# Patient Record
Sex: FEMALE | Race: WHITE | Employment: FULL TIME | URBAN - METROPOLITAN AREA
[De-identification: names, ages, dates, MRNs, and addresses within clinical notes are randomized per-mention and may not be internally consistent; named-entity substitution may affect disease eponyms.]

---

## 2017-03-31 ENCOUNTER — HOSPITAL ENCOUNTER (EMERGENCY)
Age: 57
Discharge: HOME | End: 2017-03-31
Attending: EMERGENCY MEDICINE | Admitting: EMERGENCY MEDICINE
Payer: COMMERCIAL

## 2017-03-31 ENCOUNTER — APPOINTMENT (OUTPATIENT)
Dept: CT IMAGING | Age: 57
End: 2017-03-31
Attending: PHYSICIAN ASSISTANT
Payer: COMMERCIAL

## 2017-03-31 VITALS
HEIGHT: 67 IN | TEMPERATURE: 98.9 F | SYSTOLIC BLOOD PRESSURE: 141 MMHG | OXYGEN SATURATION: 97 % | BODY MASS INDEX: 39.24 KG/M2 | RESPIRATION RATE: 18 BRPM | WEIGHT: 250 LBS | DIASTOLIC BLOOD PRESSURE: 81 MMHG | HEART RATE: 83 BPM

## 2017-03-31 DIAGNOSIS — M17.12 PRIMARY OSTEOARTHRITIS OF LEFT KNEE: ICD-10-CM

## 2017-03-31 DIAGNOSIS — M25.562 CHRONIC PAIN OF LEFT KNEE: Primary | ICD-10-CM

## 2017-03-31 DIAGNOSIS — G89.29 CHRONIC PAIN OF LEFT KNEE: Primary | ICD-10-CM

## 2017-03-31 LAB
APPEARANCE UR: CLEAR
BACTERIA URNS QL MICRO: NEGATIVE /HPF
BILIRUB UR QL: NEGATIVE
COLOR UR: ABNORMAL
EPITH CASTS URNS QL MICRO: ABNORMAL /LPF
GLUCOSE UR STRIP.AUTO-MCNC: NEGATIVE MG/DL
HGB UR QL STRIP: NEGATIVE
HYALINE CASTS URNS QL MICRO: ABNORMAL /LPF (ref 0–5)
KETONES UR QL STRIP.AUTO: NEGATIVE MG/DL
LEUKOCYTE ESTERASE UR QL STRIP.AUTO: ABNORMAL
NITRITE UR QL STRIP.AUTO: NEGATIVE
PH UR STRIP: 6.5 [PH] (ref 5–8)
PROT UR STRIP-MCNC: NEGATIVE MG/DL
RBC #/AREA URNS HPF: ABNORMAL /HPF (ref 0–5)
SP GR UR REFRACTOMETRY: 1.01 (ref 1–1.03)
UA: UC IF INDICATED,UAUC: ABNORMAL
UROBILINOGEN UR QL STRIP.AUTO: 0.2 EU/DL (ref 0.2–1)
WBC URNS QL MICRO: ABNORMAL /HPF (ref 0–4)

## 2017-03-31 PROCEDURE — 99282 EMERGENCY DEPT VISIT SF MDM: CPT

## 2017-03-31 PROCEDURE — 96372 THER/PROPH/DIAG INJ SC/IM: CPT

## 2017-03-31 PROCEDURE — 74176 CT ABD & PELVIS W/O CONTRAST: CPT

## 2017-03-31 PROCEDURE — 74011250637 HC RX REV CODE- 250/637: Performed by: PHYSICIAN ASSISTANT

## 2017-03-31 PROCEDURE — 74011250636 HC RX REV CODE- 250/636: Performed by: PHYSICIAN ASSISTANT

## 2017-03-31 PROCEDURE — 81001 URINALYSIS AUTO W/SCOPE: CPT | Performed by: EMERGENCY MEDICINE

## 2017-03-31 RX ORDER — ONDANSETRON 4 MG/1
4 TABLET, ORALLY DISINTEGRATING ORAL
Status: COMPLETED | OUTPATIENT
Start: 2017-03-31 | End: 2017-03-31

## 2017-03-31 RX ORDER — KETOROLAC TROMETHAMINE 30 MG/ML
30 INJECTION, SOLUTION INTRAMUSCULAR; INTRAVENOUS
Status: COMPLETED | OUTPATIENT
Start: 2017-03-31 | End: 2017-03-31

## 2017-03-31 RX ORDER — OXYCODONE AND ACETAMINOPHEN 5; 325 MG/1; MG/1
1 TABLET ORAL
Qty: 15 TAB | Refills: 0 | Status: ON HOLD | OUTPATIENT
Start: 2017-03-31 | End: 2020-10-29

## 2017-03-31 RX ORDER — HYDROMORPHONE HYDROCHLORIDE 1 MG/ML
1 INJECTION, SOLUTION INTRAMUSCULAR; INTRAVENOUS; SUBCUTANEOUS
Status: COMPLETED | OUTPATIENT
Start: 2017-03-31 | End: 2017-03-31

## 2017-03-31 RX ADMIN — KETOROLAC TROMETHAMINE 30 MG: 30 INJECTION, SOLUTION INTRAMUSCULAR at 13:58

## 2017-03-31 RX ADMIN — HYDROMORPHONE HYDROCHLORIDE 1 MG: 1 INJECTION, SOLUTION INTRAMUSCULAR; INTRAVENOUS; SUBCUTANEOUS at 13:58

## 2017-03-31 RX ADMIN — ONDANSETRON 4 MG: 4 TABLET, ORALLY DISINTEGRATING ORAL at 13:58

## 2017-03-31 NOTE — ED PROVIDER NOTES
HPI Comments: Jim Calixto is a 55 y.o. female with PMhx significant for fibromyalgia and kidney stones who presents ambulatory to the ED with cc of 8/10 gradually worsening left knee pain which radiated to her left calf x 2 days. She also c/o associated swelling to her left knee. The pt reports a h/o right knee replacement in 11/2016 in [place], [place]. She states that she has not taken any pain medications for her symptoms. She denies any recent long distance travel, and she denies any h/o DVT. She denies any chance of pregnancy, and she specifically denies appetite change, fever, chills, or frequency at this time. The pt also c/o gradually worsening left flank pain. She states that she was recently diagnosed with kidney stones, noting that she has passed some stones. SHx: -tobacco, -EtOH, -illicit drug use    PCP: Julee Dumont MD    There are no other complaints, changes or physical findings at this time. The history is provided by the patient. No  was used. Past Medical History:   Diagnosis Date    Fibromyalgia     Kidney stones     passed 72 stones    Other ill-defined conditions(799.89)     kidney stones       Past Surgical History:   Procedure Laterality Date    HX OTHER SURGICAL      lung biopsy         History reviewed. No pertinent family history. Social History     Social History    Marital status: UNKNOWN     Spouse name: N/A    Number of children: N/A    Years of education: N/A     Occupational History    Not on file. Social History Main Topics    Smoking status: Never Smoker    Smokeless tobacco: Never Used    Alcohol use No    Drug use: No    Sexual activity: No     Other Topics Concern    Not on file     Social History Narrative         ALLERGIES: Sulfa (sulfonamide antibiotics)    Review of Systems   Constitutional: Negative. Negative for activity change, appetite change, chills, fatigue, fever and unexpected weight change.    HENT: Negative. Negative for congestion, hearing loss, rhinorrhea, sneezing and voice change. Eyes: Negative. Negative for pain and visual disturbance. Respiratory: Negative. Negative for apnea, cough, choking, chest tightness and shortness of breath. Cardiovascular: Negative. Negative for chest pain and palpitations. Gastrointestinal: Negative. Negative for abdominal distention, abdominal pain, blood in stool, diarrhea, nausea and vomiting. Genitourinary: Positive for flank pain (left). Negative for difficulty urinating, frequency and urgency. No discharge   Musculoskeletal: Positive for arthralgias (left knee) and joint swelling (left knee). Negative for back pain, myalgias and neck stiffness. Skin: Negative. Negative for color change and rash. Neurological: Negative. Negative for dizziness, seizures, syncope, speech difficulty, weakness, numbness and headaches. Hematological: Negative for adenopathy. Psychiatric/Behavioral: Negative. Negative for agitation, behavioral problems, dysphoric mood and suicidal ideas. The patient is not nervous/anxious. Patient Vitals for the past 12 hrs:   Temp Pulse Resp BP SpO2   03/31/17 1438 - 83 18 141/81 97 %   03/31/17 1324 98.9 °F (37.2 °C) 100 20 164/87 95 %            Physical Exam   Constitutional: She is oriented to person, place, and time. She appears well-developed and well-nourished. No distress. HENT:   Head: Normocephalic and atraumatic. Right Ear: External ear normal.   Left Ear: External ear normal.   Nose: Nose normal.   Mouth/Throat: Oropharynx is clear and moist. No oropharyngeal exudate. Eyes: Conjunctivae and EOM are normal. Pupils are equal, round, and reactive to light. Right eye exhibits no discharge. Left eye exhibits no discharge. No scleral icterus. Neck: Normal range of motion. Neck supple. No JVD present. No tracheal deviation present.    Cardiovascular: Normal rate, regular rhythm, normal heart sounds and intact distal pulses. Exam reveals no gallop and no friction rub. No murmur heard. Pulmonary/Chest: Effort normal and breath sounds normal. No respiratory distress. She has no wheezes. She has no rales. She exhibits no tenderness. Abdominal: Soft. Bowel sounds are normal. She exhibits no distension and no mass. There is no tenderness. There is no rebound and no guarding. Musculoskeletal: She exhibits edema (left knee) and tenderness (left knee). Decreased active and passive range of motion to left knee with effusion; negative Homans' sign. Lymphadenopathy:     She has no cervical adenopathy. Neurological: She is alert and oriented to person, place, and time. She has normal reflexes. No cranial nerve deficit. She exhibits normal muscle tone. Coordination normal.   Skin: Skin is warm and dry. She is not diaphoretic. Psychiatric: She has a normal mood and affect. Her behavior is normal. Judgment and thought content normal.   Nursing note and vitals reviewed. MDM  Number of Diagnoses or Management Options  Diagnosis management comments:   DDx: DJD, effusion, kidney stones, UTI.        Amount and/or Complexity of Data Reviewed  Clinical lab tests: ordered and reviewed  Tests in the radiology section of CPT®: ordered and reviewed  Review and summarize past medical records: yes  Independent visualization of images, tracings, or specimens: yes    Patient Progress  Patient progress: stable    ED Course       Procedures      LABORATORY TESTS:  Recent Results (from the past 12 hour(s))   URINALYSIS W/ REFLEX CULTURE    Collection Time: 03/31/17  1:40 PM   Result Value Ref Range    Color YELLOW/STRAW      Appearance CLEAR CLEAR      Specific gravity 1.011 1.003 - 1.030      pH (UA) 6.5 5.0 - 8.0      Protein NEGATIVE  NEG mg/dL    Glucose NEGATIVE  NEG mg/dL    Ketone NEGATIVE  NEG mg/dL    Bilirubin NEGATIVE  NEG      Blood NEGATIVE  NEG      Urobilinogen 0.2 0.2 - 1.0 EU/dL    Nitrites NEGATIVE  NEG Leukocyte Esterase TRACE (A) NEG      WBC 0-4 0 - 4 /hpf    RBC 0-5 0 - 5 /hpf    Epithelial cells FEW FEW /lpf    Bacteria NEGATIVE  NEG /hpf    UA:UC IF INDICATED CULTURE NOT INDICATED BY UA RESULT CNI      Hyaline cast 0-2 0 - 5 /lpf       IMAGING RESULTS:  CT ABD PELV WO CONT   Final Result   Clinical history: Left flank pain, history of nephrolithiasis     INDICATION: l flank pain prior stones     COMPARISON:     TECHNIQUE:   Thin axial images were obtained through the abdomen and pelvis. Coronal and  sagittal reconstructions were generated. Oral contrast was not administered. CT  dose reduction was achieved through use of a standardized protocol tailored for  this examination and automatic exposure control for dose modulation.      The absence of intravenous contrast material reduces the sensitivity for  evaluation of the solid parenchymal organs of the abdomen.      FINDINGS:   CRITICAL FINDINGS:      None         INCIDENTALS: There are punctate nonobstructive renal calculi on the right and on  the left up to 4 mm in size. There is no hydroureter or hydronephrosis present. No evidence of diverticulitis. Left lower pole renal hypodensity is likely a  simple cyst.        ADRENALS/KIDNEYS: No mass, or hydronephrosis. there is no hydroureter or  hydronephrosis. There is no renal mass. There is no perinephric mass.     COLON AND SMALL BOWEL: No dilatation or wall thickening. There is no obstruction  or free intraperitoneal air. There is no evidence of incarceration or  obstruction.     APPENDIX: Unremarkable.     URINARY BLADDER: No mass or calculus.     BONES: No destructive bone lesion.           IMPRESSION  IMPRESSION:      Bilateral renal calculi up to 4 mm in size. No associated hydroureter or  hydronephrosis.     No acute intraperitoneal process is identified.        MEDICATIONS GIVEN:  Medications   ketorolac (TORADOL) injection 30 mg (30 mg IntraMUSCular Given 3/31/17 8213)   HYDROmorphone (PF) (DILAUDID) injection 1 mg (1 mg IntraMUSCular Given 3/31/17 0359)   ondansetron (ZOFRAN ODT) tablet 4 mg (4 mg Oral Given 3/31/17 1358)       IMPRESSION:  1. Chronic pain of left knee    2. Primary osteoarthritis of left knee        PLAN:  1. Current Discharge Medication List      START taking these medications    Details   oxyCODONE-acetaminophen (PERCOCET) 5-325 mg per tablet Take 1 Tab by mouth every six (6) hours as needed for Pain. Max Daily Amount: 4 Tabs. Qty: 15 Tab, Refills: 0           2. Follow-up Information     Follow up With Details Comments 1500 David Grigsby MD In 3 days As needed [address] [place] [address] Internal Medicine  Carmelita Dotson [id number] [phone]          Return to ED if worse       DISCHARGE NOTE:  3:09 PM  The patient has been re-evaluated and is ready for discharge. Reviewed available results with patient. Counseled patient on diagnosis and care plan. Patient has expressed understanding, and all questions have been answered. Patient agrees with plan and agrees to follow up as recommended, or return to the ED if their symptoms worsen. Discharge instructions have been provided and explained to the patient, along with reasons to return to the ED. This note is prepared by Joan Rainey, acting as Scribe for [employer]. [employer]: The scribe's documentation has been prepared under my direction and personally reviewed by me in its entirety. I confirm that the note above accurately reflects all work, treatment, procedures, and medical decision making performed by me.

## 2017-03-31 NOTE — ED NOTES
RUPESH Houston provided patient with verbal and written discharge instructions. Patient discharged ambulatory with family.

## 2017-03-31 NOTE — DISCHARGE INSTRUCTIONS
Arthritis: Care Instructions  Your Care Instructions  Arthritis, also called osteoarthritis, is a breakdown of the cartilage that cushions your joints. When the cartilage wears down, your bones rub against each other. This causes pain and stiffness. Many people have some arthritis as they age. Arthritis most often affects the joints of the spine, hands, hips, knees, or feet. You can take simple measures to protect your joints, ease your pain, and help you stay active. Follow-up care is a key part of your treatment and safety. Be sure to make and go to all appointments, and call your doctor if you are having problems. It's also a good idea to know your test results and keep a list of the medicines you take. How can you care for yourself at home? · Stay at a healthy weight. Being overweight puts extra strain on your joints. · Talk to your doctor or physical therapist about exercises that will help ease joint pain. ¨ Stretch. You may enjoy gentle forms of yoga to help keep your joints and muscles flexible. ¨ Walk instead of jog. Other types of exercise that are less stressful on the joints include riding a bicycle, swimming, sary chi, or water exercise. ¨ Lift weights. Strong muscles help reduce stress on your joints. Stronger thigh muscles, for example, take some of the stress off of the knees and hips. Learn the right way to lift weights so you do not make joint pain worse. · Take your medicines exactly as prescribed. Call your doctor if you think you are having a problem with your medicine. · Take pain medicines exactly as directed. ¨ If the doctor gave you a prescription medicine for pain, take it as prescribed. ¨ If you are not taking a prescription pain medicine, ask your doctor if you can take an over-the-counter medicine. · Use a cane, crutch, walker, or another device if you need help to get around. These can help rest your joints.  You also can use other things to make life easier, such as a higher toilet seat and padded handles on kitchen utensils. · Do not sit in low chairs, which can make it hard to get up. · Put heat or cold on your sore joints as needed. Use whichever helps you most. You also can take turns with hot and cold packs. ¨ Apply heat 2 or 3 times a day for 20 to 30 minutes--using a heating pad, hot shower, or hot pack--to relieve pain and stiffness. ¨ Put ice or a cold pack on your sore joint for 10 to 20 minutes at a time. Put a thin cloth between the ice and your skin. When should you call for help? Call your doctor now or seek immediate medical care if:  · You have sudden swelling, warmth, or pain in any joint. · You have joint pain and a fever or rash. · You have such bad pain that you cannot use a joint. Watch closely for changes in your health, and be sure to contact your doctor if:  · You have mild joint symptoms that continue even with more than 6 weeks of care at home. · You have stomach pain or other problems with your medicine. Where can you learn more? Go to http://nataliaI-Marketgalina.info/. Enter H412 in the search box to learn more about \"Arthritis: Care Instructions. \"  Current as of: November 28, 2016  Content Version: 11.2  © 0200-6854 [employer]. Care instructions adapted under license by [employer] (which disclaims liability or warranty for this information). If you have questions about a medical condition or this instruction, always ask your healthcare professional. [place] [address] any warranty or liability for your use of this information. Joint Pain: Care Instructions  Your Care Instructions  Many people have small aches and pains from overuse or injury to muscles and joints. Joint injuries often happen during sports or recreation, work tasks, or projects around the home.  An overuse injury can happen when you put too much stress on a joint or when you do an activity that stresses the joint over and over, such as using the computer or rowing a boat. You can take action at home to help your muscles and joints get better. You should feel better in 1 to 2 weeks, but it can take 3 months or more to heal completely. Follow-up care is a key part of your treatment and safety. Be sure to make and go to all appointments, and call your doctor if you are having problems. It's also a good idea to know your test results and keep a list of the medicines you take. How can you care for yourself at home? · Do not put weight on the injured joint for at least a day or two. · For the first day or two after an injury, do not take hot showers or baths, and do not use hot packs. The heat could make swelling worse. · Put ice or a cold pack on the sore joint for 10 to 20 minutes at a time. Try to do this every 1 to 2 hours for the next 3 days (when you are awake) or until the swelling goes down. Put a thin cloth between the ice and your skin. · Wrap the injury in an elastic bandage. Do not wrap it too tightly because this can cause more swelling. · Prop up the sore joint on a pillow when you ice it or anytime you sit or lie down during the next 3 days. Try to keep it above the level of your heart. This will help reduce swelling. · Take an over-the-counter pain medicine, such as acetaminophen (Tylenol), ibuprofen (Advil, Motrin), or naproxen (Aleve). Read and follow all instructions on the label. · After 1 or 2 days of rest, begin moving the joint gently. While the joint is still healing, you can begin to exercise using activities that do not strain or hurt the painful joint. When should you call for help? Call your doctor now or seek immediate medical care if:  · You have signs of infection, such as:  ¨ Increased pain, swelling, warmth, and redness. ¨ Red streaks leading from the joint. ¨ A fever.   Watch closely for changes in your health, and be sure to contact your doctor if:  · Your movement or symptoms are not getting better after 1 to 2 weeks of home treatment. Where can you learn more? Go to http://natalia-galina.info/. Enter P205 in the search box to learn more about \"Joint Pain: Care Instructions. \"  Current as of: May 23, 2016  Content Version: 11.2  © 0916-0600 [employer]. Care instructions adapted under license by [employer] (which disclaims liability or warranty for this information). If you have questions about a medical condition or this instruction, always ask your healthcare professional. [place] [address] any warranty or liability for your use of this information. Knee Arthritis: Care Instructions  Your Care Instructions  Knee arthritis is a breakdown of the cartilage that cushions your knee joint. When the cartilage wears down, your bones rub against each other. This causes pain and stiffness. Knee arthritis tends to get worse with time. Treatment for knee arthritis involves reducing pain, making the leg muscles stronger, and staying at a healthy body weight. The treatment usually does not improve the health of the cartilage, but it can reduce pain and improve how well your knee works. You can take simple measures to protect your knee joints, ease your pain, and help you stay active. Follow-up care is a key part of your treatment and safety. Be sure to make and go to all appointments, and call your doctor if you are having problems. It's also a good idea to know your test results and keep a list of the medicines you take. How can you care for yourself at home? · Know that knee arthritis will cause more pain on some days than on others. · Stay at a healthy weight. Lose weight if you are overweight. When you stand up, the pressure on your knees from every pound of body weight is multiplied four times. So if you lose 10 pounds, you will reduce the pressure on your knees by 40 pounds.   · Talk to your doctor or physical therapist about exercises that will help ease joint pain. ¨ Stretch to help prevent stiffness and to prevent injury before you exercise. You may enjoy gentle forms of yoga to help keep your knee joints and muscles flexible. ¨ Walk instead of jog. ¨ Ride a bike. This makes your thigh muscles stronger and takes pressure off your knee. ¨ Wear well-fitting and comfortable shoes. ¨ Exercise in chest-deep water. This can help you exercise longer with less pain. ¨ Avoid exercises that include squatting or kneeling. They can put a lot of strain on your knees. ¨ Talk to your doctor to make sure that the exercise you do is not making the arthritis worse. · Do not sit for long periods of time. Try to walk once in a while to keep your knee from getting stiff. · Ask your doctor or physical therapist whether shoe inserts may reduce your arthritis pain. · If you can afford it, get new athletic shoes at least every year. This can help reduce the strain on your knees. · Use a device to help you do everyday activities. ¨ A cane or walking stick can help you keep your balance when you walk. Hold the cane or walking stick in the hand opposite the painful knee. ¨ If you feel like you may fall when you walk, try using crutches or a front-wheeled walker. These can prevent falls that could cause more damage to your knee. ¨ A knee brace may help keep your knee stable and prevent pain. ¨ You also can use other things to make life easier, such as a higher toilet seat and handrails in the bathtub or shower. · Take pain medicines exactly as directed. ¨ Do not wait until you are in severe pain. You will get better results if you take it sooner. ¨ If you are not taking a prescription pain medicine, take an over-the-counter medicine such as acetaminophen (Tylenol), ibuprofen (Advil, Motrin), or naproxen (Aleve). Read and follow all instructions on the label.   ¨ Do not take two or more pain medicines at the same time unless the doctor told you to. Many pain medicines have acetaminophen, which is Tylenol. Too much acetaminophen (Tylenol) can be harmful. ¨ Tell your doctor if you take a blood thinner, have diabetes, or have allergies to shellfish. · Ask your doctor if you might benefit from a shot of steroid medicine into your knee. This may provide pain relief for several months. · Many people take the supplements glucosamine and chondroitin for osteoarthritis. Some people feel they help, but the medical research does not show that they work. Talk to your doctor before you take these supplements. When should you call for help? Call your doctor now or seek immediate medical care if:  · You have sudden swelling, warmth, or pain in your knee. · You have knee pain and a fever or rash. · You have such bad pain that you cannot use your knee. Watch closely for changes in your health, and be sure to contact your doctor if you have any problems. Where can you learn more? Go to http://natalia-galina.info/. Enter Y341 in the search box to learn more about \"Knee Arthritis: Care Instructions. \"  Current as of: October 31, 2016  Content Version: 11.2  © 4078-2338 [employer]. Care instructions adapted under license by [employer] (which disclaims liability or warranty for this information). If you have questions about a medical condition or this instruction, always ask your healthcare professional. [address] any warranty or liability for your use of this information.

## 2017-03-31 NOTE — ED NOTES
Pt arrives to ED with complaints of left knee pain and swelling x2 days. Pt states that she knows she needs a knee replacement because she has bone on bone but she is from out of town and can not get get to her her Dr at home. Pt also has complaints of left flank pain and states she has been \"battling a UTI\". No other complaints at this time. Will continue to monitor.

## 2017-04-03 ENCOUNTER — PATIENT OUTREACH (OUTPATIENT)
Dept: INTERNAL MEDICINE CLINIC | Age: 57
End: 2017-04-03

## 2017-04-03 NOTE — PROGRESS NOTES
NNTOCED-    Patient noted on [facility] report. NN noted on EMR review that this patient has not seen Dr Ovidio Bran since 2015. NN contacted patient who verified that she is only in town visiting and now lives in [facility]. Dr Ovidio Bran is therefore no longer her PCP and will be removed as PCP in the EMR. Patient encouraged to call the clinic if she needs any further assistance/ care while in town.

## 2020-10-23 ENCOUNTER — HOSPITAL ENCOUNTER (INPATIENT)
Age: 60
LOS: 6 days | Discharge: HOME | DRG: 871 | End: 2020-10-29
Attending: STUDENT IN AN ORGANIZED HEALTH CARE EDUCATION/TRAINING PROGRAM | Admitting: INTERNAL MEDICINE
Payer: COMMERCIAL

## 2020-10-23 ENCOUNTER — APPOINTMENT (OUTPATIENT)
Dept: GENERAL RADIOLOGY | Age: 60
DRG: 871 | End: 2020-10-23
Attending: EMERGENCY MEDICINE
Payer: COMMERCIAL

## 2020-10-23 DIAGNOSIS — J12.82 PNEUMONIA DUE TO COVID-19 VIRUS: Primary | ICD-10-CM

## 2020-10-23 DIAGNOSIS — U07.1 PNEUMONIA DUE TO COVID-19 VIRUS: Primary | ICD-10-CM

## 2020-10-23 DIAGNOSIS — F41.9 ANXIETY: ICD-10-CM

## 2020-10-23 PROBLEM — G47.33 OSA ON CPAP: Chronic | Status: ACTIVE | Noted: 2020-10-23

## 2020-10-23 PROBLEM — Z99.89 OSA ON CPAP: Chronic | Status: ACTIVE | Noted: 2020-10-23

## 2020-10-23 PROBLEM — Z99.89 OSA ON CPAP: Status: ACTIVE | Noted: 2020-10-23

## 2020-10-23 PROBLEM — J96.01 ACUTE RESPIRATORY FAILURE WITH HYPOXIA (HCC): Status: ACTIVE | Noted: 2020-10-23

## 2020-10-23 PROBLEM — G47.33 OSA ON CPAP: Status: ACTIVE | Noted: 2020-10-23

## 2020-10-23 PROBLEM — R79.89 ELEVATED LFTS: Status: ACTIVE | Noted: 2020-10-23

## 2020-10-23 PROBLEM — E87.6 HYPOKALEMIA: Status: ACTIVE | Noted: 2020-10-23

## 2020-10-23 LAB
ALBUMIN SERPL-MCNC: 3.1 G/DL (ref 3.5–5)
ALBUMIN/GLOB SERPL: 0.7 {RATIO} (ref 1.1–2.2)
ALP SERPL-CCNC: 70 U/L (ref 45–117)
ALT SERPL-CCNC: 150 U/L (ref 12–78)
ANION GAP SERPL CALC-SCNC: 8 MMOL/L (ref 5–15)
APTT PPP: 27.8 SEC (ref 22.1–32)
AST SERPL-CCNC: 111 U/L (ref 15–37)
BASOPHILS # BLD: 0 K/UL (ref 0–0.1)
BASOPHILS NFR BLD: 0 % (ref 0–1)
BILIRUB SERPL-MCNC: 0.5 MG/DL (ref 0.2–1)
BUN SERPL-MCNC: 15 MG/DL (ref 6–20)
BUN/CREAT SERPL: 21 (ref 12–20)
CALCIUM SERPL-MCNC: 9 MG/DL (ref 8.5–10.1)
CHLORIDE SERPL-SCNC: 99 MMOL/L (ref 97–108)
CO2 SERPL-SCNC: 31 MMOL/L (ref 21–32)
COMMENT, HOLDF: NORMAL
COMMENT, HOLDF: NORMAL
CREAT SERPL-MCNC: 0.72 MG/DL (ref 0.55–1.02)
D DIMER PPP FEU-MCNC: 0.61 MG/L FEU (ref 0–0.65)
DIFFERENTIAL METHOD BLD: ABNORMAL
EOSINOPHIL # BLD: 0 K/UL (ref 0–0.4)
EOSINOPHIL NFR BLD: 0 % (ref 0–7)
ERYTHROCYTE [DISTWIDTH] IN BLOOD BY AUTOMATED COUNT: 13.1 % (ref 11.5–14.5)
FERRITIN SERPL-MCNC: 97 NG/ML (ref 8–252)
FIBRINOGEN PPP-MCNC: 606 MG/DL (ref 200–475)
GLOBULIN SER CALC-MCNC: 4.3 G/DL (ref 2–4)
GLUCOSE SERPL-MCNC: 104 MG/DL (ref 65–100)
HCT VFR BLD AUTO: 35.9 % (ref 35–47)
HGB BLD-MCNC: 12.2 G/DL (ref 11.5–16)
IMM GRANULOCYTES # BLD AUTO: 0 K/UL (ref 0–0.04)
IMM GRANULOCYTES NFR BLD AUTO: 0 % (ref 0–0.5)
INR PPP: 1 (ref 0.9–1.1)
LACTATE SERPL-SCNC: 0.7 MMOL/L (ref 0.4–2)
LDH SERPL L TO P-CCNC: 322 U/L (ref 81–246)
LYMPHOCYTES # BLD: 0.7 K/UL (ref 0.8–3.5)
LYMPHOCYTES NFR BLD: 19 % (ref 12–49)
MCH RBC QN AUTO: 30.3 PG (ref 26–34)
MCHC RBC AUTO-ENTMCNC: 34 G/DL (ref 30–36.5)
MCV RBC AUTO: 89.1 FL (ref 80–99)
METAMYELOCYTES NFR BLD MANUAL: 1 %
MONOCYTES # BLD: 0.2 K/UL (ref 0–1)
MONOCYTES NFR BLD: 5 % (ref 5–13)
NEUTS BAND NFR BLD MANUAL: 9 %
NEUTS SEG # BLD: 2.9 K/UL (ref 1.8–8)
NEUTS SEG NFR BLD: 66 % (ref 32–75)
NRBC # BLD: 0 K/UL (ref 0–0.01)
NRBC BLD-RTO: 0 PER 100 WBC
PLATELET # BLD AUTO: 189 K/UL (ref 150–400)
PMV BLD AUTO: 11 FL (ref 8.9–12.9)
POTASSIUM SERPL-SCNC: 3.2 MMOL/L (ref 3.5–5.1)
PROCALCITONIN SERPL-MCNC: <0.05 NG/ML
PROT SERPL-MCNC: 7.4 G/DL (ref 6.4–8.2)
PROTHROMBIN TIME: 10.3 SEC (ref 9–11.1)
RBC # BLD AUTO: 4.03 M/UL (ref 3.8–5.2)
RBC MORPH BLD: ABNORMAL
SAMPLES BEING HELD,HOLD: NORMAL
SAMPLES BEING HELD,HOLD: NORMAL
SODIUM SERPL-SCNC: 138 MMOL/L (ref 136–145)
THERAPEUTIC RANGE,PTTT: NORMAL SECS (ref 58–77)
TROPONIN I SERPL-MCNC: <0.05 NG/ML
WBC # BLD AUTO: 3.8 K/UL (ref 3.6–11)

## 2020-10-23 PROCEDURE — 85730 THROMBOPLASTIN TIME PARTIAL: CPT

## 2020-10-23 PROCEDURE — 85025 COMPLETE CBC W/AUTO DIFF WBC: CPT

## 2020-10-23 PROCEDURE — 74011000258 HC RX REV CODE- 258: Performed by: INTERNAL MEDICINE

## 2020-10-23 PROCEDURE — 99284 EMERGENCY DEPT VISIT MOD MDM: CPT

## 2020-10-23 PROCEDURE — 74011250637 HC RX REV CODE- 250/637: Performed by: STUDENT IN AN ORGANIZED HEALTH CARE EDUCATION/TRAINING PROGRAM

## 2020-10-23 PROCEDURE — 74011250636 HC RX REV CODE- 250/636: Performed by: INTERNAL MEDICINE

## 2020-10-23 PROCEDURE — 85379 FIBRIN DEGRADATION QUANT: CPT

## 2020-10-23 PROCEDURE — 71045 X-RAY EXAM CHEST 1 VIEW: CPT

## 2020-10-23 PROCEDURE — 85610 PROTHROMBIN TIME: CPT

## 2020-10-23 PROCEDURE — 74011250637 HC RX REV CODE- 250/637: Performed by: PHYSICIAN ASSISTANT

## 2020-10-23 PROCEDURE — 85384 FIBRINOGEN ACTIVITY: CPT

## 2020-10-23 PROCEDURE — 80053 COMPREHEN METABOLIC PANEL: CPT

## 2020-10-23 PROCEDURE — 83605 ASSAY OF LACTIC ACID: CPT

## 2020-10-23 PROCEDURE — 74011250637 HC RX REV CODE- 250/637: Performed by: INTERNAL MEDICINE

## 2020-10-23 PROCEDURE — 84145 PROCALCITONIN (PCT): CPT

## 2020-10-23 PROCEDURE — 65660000000 HC RM CCU STEPDOWN

## 2020-10-23 PROCEDURE — 36415 COLL VENOUS BLD VENIPUNCTURE: CPT

## 2020-10-23 PROCEDURE — 83615 LACTATE (LD) (LDH) ENZYME: CPT

## 2020-10-23 PROCEDURE — 0100U RESPIRATORY PANEL,PCR,NASOPHARYNGEAL: CPT

## 2020-10-23 PROCEDURE — 74011250636 HC RX REV CODE- 250/636: Performed by: STUDENT IN AN ORGANIZED HEALTH CARE EDUCATION/TRAINING PROGRAM

## 2020-10-23 PROCEDURE — 65270000029 HC RM PRIVATE

## 2020-10-23 PROCEDURE — 84484 ASSAY OF TROPONIN QUANT: CPT

## 2020-10-23 PROCEDURE — 82728 ASSAY OF FERRITIN: CPT

## 2020-10-23 RX ORDER — ASCORBIC ACID 500 MG
1000 TABLET ORAL DAILY
Status: DISCONTINUED | OUTPATIENT
Start: 2020-10-24 | End: 2020-10-29 | Stop reason: HOSPADM

## 2020-10-23 RX ORDER — GUAIFENESIN/DEXTROMETHORPHAN 100-10MG/5
5 SYRUP ORAL
Status: DISCONTINUED | OUTPATIENT
Start: 2020-10-23 | End: 2020-10-29 | Stop reason: HOSPADM

## 2020-10-23 RX ORDER — DEXAMETHASONE 4 MG/1
6 TABLET ORAL DAILY
Status: DISCONTINUED | OUTPATIENT
Start: 2020-10-24 | End: 2020-10-29 | Stop reason: HOSPADM

## 2020-10-23 RX ORDER — GABAPENTIN 300 MG/1
300 CAPSULE ORAL 3 TIMES DAILY
Status: DISCONTINUED | OUTPATIENT
Start: 2020-10-23 | End: 2020-10-29 | Stop reason: HOSPADM

## 2020-10-23 RX ORDER — DULOXETIN HYDROCHLORIDE 30 MG/1
60 CAPSULE, DELAYED RELEASE ORAL 2 TIMES DAILY
Status: DISCONTINUED | OUTPATIENT
Start: 2020-10-23 | End: 2020-10-29 | Stop reason: HOSPADM

## 2020-10-23 RX ORDER — SODIUM CHLORIDE 0.9 % (FLUSH) 0.9 %
5-40 SYRINGE (ML) INJECTION AS NEEDED
Status: DISCONTINUED | OUTPATIENT
Start: 2020-10-23 | End: 2020-10-29 | Stop reason: HOSPADM

## 2020-10-23 RX ORDER — POTASSIUM CHLORIDE 750 MG/1
40 TABLET, FILM COATED, EXTENDED RELEASE ORAL
Status: COMPLETED | OUTPATIENT
Start: 2020-10-23 | End: 2020-10-23

## 2020-10-23 RX ORDER — DEXAMETHASONE 4 MG/1
6 TABLET ORAL ONCE
Status: COMPLETED | OUTPATIENT
Start: 2020-10-23 | End: 2020-10-23

## 2020-10-23 RX ORDER — ZINC SULFATE 50(220)MG
1 CAPSULE ORAL DAILY
Status: DISCONTINUED | OUTPATIENT
Start: 2020-10-24 | End: 2020-10-29 | Stop reason: HOSPADM

## 2020-10-23 RX ORDER — ACETAMINOPHEN 500 MG
1000 TABLET ORAL ONCE
Status: COMPLETED | OUTPATIENT
Start: 2020-10-23 | End: 2020-10-23

## 2020-10-23 RX ORDER — ONDANSETRON 2 MG/ML
4 INJECTION INTRAMUSCULAR; INTRAVENOUS
Status: DISCONTINUED | OUTPATIENT
Start: 2020-10-23 | End: 2020-10-29 | Stop reason: HOSPADM

## 2020-10-23 RX ORDER — GUAIFENESIN 600 MG/1
600 TABLET, EXTENDED RELEASE ORAL EVERY 12 HOURS
Status: DISCONTINUED | OUTPATIENT
Start: 2020-10-23 | End: 2020-10-29 | Stop reason: HOSPADM

## 2020-10-23 RX ORDER — ALBUTEROL SULFATE 90 UG/1
2 AEROSOL, METERED RESPIRATORY (INHALATION)
Status: DISCONTINUED | OUTPATIENT
Start: 2020-10-23 | End: 2020-10-26

## 2020-10-23 RX ORDER — SODIUM CHLORIDE 0.9 % (FLUSH) 0.9 %
5-40 SYRINGE (ML) INJECTION EVERY 8 HOURS
Status: DISCONTINUED | OUTPATIENT
Start: 2020-10-23 | End: 2020-10-29 | Stop reason: HOSPADM

## 2020-10-23 RX ORDER — ENOXAPARIN SODIUM 100 MG/ML
40 INJECTION SUBCUTANEOUS DAILY
Status: DISCONTINUED | OUTPATIENT
Start: 2020-10-24 | End: 2020-10-24

## 2020-10-23 RX ADMIN — ALBUTEROL SULFATE 2 PUFF: 90 AEROSOL, METERED RESPIRATORY (INHALATION) at 20:49

## 2020-10-23 RX ADMIN — Medication 10 ML: at 23:00

## 2020-10-23 RX ADMIN — AZITHROMYCIN 500 MG: 500 INJECTION, POWDER, LYOPHILIZED, FOR SOLUTION INTRAVENOUS at 21:01

## 2020-10-23 RX ADMIN — ACETAMINOPHEN 1000 MG: 500 TABLET ORAL at 16:00

## 2020-10-23 RX ADMIN — GUAIFENESIN 600 MG: 600 TABLET, EXTENDED RELEASE ORAL at 20:49

## 2020-10-23 RX ADMIN — POTASSIUM CHLORIDE 40 MEQ: 750 TABLET, FILM COATED, EXTENDED RELEASE ORAL at 20:49

## 2020-10-23 RX ADMIN — DULOXETINE HYDROCHLORIDE 60 MG: 30 CAPSULE, DELAYED RELEASE ORAL at 22:12

## 2020-10-23 RX ADMIN — DEXAMETHASONE 6 MG: 4 TABLET ORAL at 20:48

## 2020-10-23 RX ADMIN — GABAPENTIN 300 MG: 300 CAPSULE ORAL at 22:11

## 2020-10-23 RX ADMIN — CEFTRIAXONE 1 G: 1 INJECTION, POWDER, FOR SOLUTION INTRAMUSCULAR; INTRAVENOUS at 20:49

## 2020-10-23 NOTE — H&P
Hospitalist Admission Note    NAME: Loretta Farrell   :  1960   MRN:  [id number]     Date/Time:  10/23/2020 7:31 PM    Patient PCP: Lizy Neville MD   ______________________________________________________________________  Given the patient's current clinical presentation, I have a high level of concern for decompensation if discharged from the emergency department. Complex decision making was performed, which includes reviewing the patient's available past medical records, laboratory results, and x-ray films. My assessment of this patient's clinical condition and my plan of care is as follows.     Assessment / Plan:  Acute respiratory failure with hypoxia POA-hypoxic on room air in ED 88%  Due to bilateral pneumonia due to COVID-19 infection POA  Hypokalemia POA  Elevated LFTs POA-likely due to Covid infection related sepsis  Obstructive sleep apnea on CPAP at home  Fibromyalgia POA  Chest x-ray= consistent with Covid pneumonia  Fever of 101.7  Procalcitonin less than 0.05  Troponin negative  LFTs 150/111/70    INR 1.0  Outside COVID-19 test positive over the weekend at an urgent care    Admit to remote telemetry bed with droplet plus precautions  Oxygen to keep sats more than 90%  Decadron for 10 days per protocol  Empiric ceftriaxone plus azithromycin IV for now  Albuterol HFA every 6 RT per Covid protocol  Mucinex twice daily   vitamin C, zinc PO   Robitussin-DM as needed cough  Replenish potassium p.o. x1  Follow LFTs avoid Tylenol  Continue home gabapentin  Continue home Cymbalta  Continue home CPAP        Code Status: Full code  Surrogate Decision Maker: Nahed Hastings  Friend [phone]          DVT Prophylaxis: Subcu Lovenox  GI Prophylaxis: not indicated    Baseline: Patient is independent with ADLs at home      Subjective:   CHIEF COMPLAINT: High fevers with cough and generalized weakness times few days    HISTORY OF PRESENT ILLNESS: Janeth Beckett is a 63 y.o.  female who presents with above complaints from home ambulatory. Patient presented with chief complaint of high fevers with cough and shortness of breath for past few days  History of associated worsening generalized weakness with poor appetite and nausea  History of being tested positive for Covid past weekend after she attended a conference ([employer] [facility] [employer]) and multiple people got Covid/sick  Patient history of obstructive sleep apnea uses CPAP  Patient also has history of fibromyalgia for which she takes Neurontin and Cymbalta. Patient was found to be hypoxic into the 80 % on RA in ED with fever of 101.7 and chest x-ray showing classic COVID-19 pneumonia. We were asked to admit for work up and evaluation of the above problems. Past Medical History:   Diagnosis Date    Fibromyalgia     Kidney stones     passed 72 stones    Other ill-defined conditions(799.89)     kidney stones        Past Surgical History:   Procedure Laterality Date    HX OTHER SURGICAL      lung biopsy       Social History     Tobacco Use    Smoking status: Never Smoker    Smokeless tobacco: Never Used   Substance Use Topics    Alcohol use: No      Family history  Denies any history of diabetes, heart disease, or cancers in the family    Allergies   Allergen Reactions    Sulfa (Sulfonamide Antibiotics) Other (comments)     dizziness        Prior to Admission medications    Medication Sig Start Date End Date Taking? Authorizing Provider   oxyCODONE-acetaminophen (PERCOCET) 5-325 mg per tablet Take 1 Tab by mouth every six (6) hours as needed for Pain. Max Daily Amount: 4 Tabs. 3/31/17   Giorgio Houston PA   gabapentin (NEURONTIN) 300 mg capsule Take 1 Cap by mouth three (3) times daily. 6/24/15   Vinny Santacruz MD   duloxetine (CYMBALTA) 60 mg capsule Take 60 mg by mouth two (2) times a day.     Provider, Historical   hydrocortisone (HYTONE) 2.5 % topical cream Apply  to affected area two (2) times a day. use thin layer 10/28/14   Dallas Rizo MD   hydrocodone-acetaminophen [facility]) 5-325 mg per tablet Take 1 tablet by mouth every six (6) hours as needed for Pain for up to 20 doses. 10/28/14   Dallas Rizo MD       REVIEW OF SYSTEMS:         Total of 12 systems reviewed as follows:       POSITIVE= underlined text  Negative = text not underlined  General:  fever, chills, sweats, generalized weakness, weight loss/gain,      loss of appetite   Eyes:    blurred vision, eye pain, loss of vision, double vision  ENT:    rhinorrhea, pharyngitis   Respiratory:   cough, sputum production, SOB, ROY, wheezing, pleuritic pain   Cardiology:   chest pain, palpitations, orthopnea, PND, edema, syncope   Gastrointestinal:  abdominal pain , nausea,  diarrhea, dysphagia, constipation, bleeding   Genitourinary:  frequency, urgency, dysuria, hematuria, incontinence   Muskuloskeletal :  arthralgia, myalgia, back pain  Hematology:  easy bruising, nose or gum bleeding, lymphadenopathy   Dermatological: rash, ulceration, pruritis, color change / jaundice  Endocrine:   hot flashes or polydipsia   Neurological:  headache, dizziness, confusion, focal weakness, paresthesia,     Speech difficulties, memory loss, gait difficulty  Psychological: Feelings of anxiety, depression, agitation    Objective:   VITALS:    Visit Vitals  BP (!) 152/93   Pulse 91   Temp (!) 101.7 °F (38.7 °C)   Resp 20   Ht 5' 8\" (1.727 m)   Wt 119.1 kg (262 lb 9.1 oz)   SpO2 96%   BMI 39.92 kg/m²       PHYSICAL EXAM:    General:    Alert, cooperative, no distress, appears stated age. HEENT: Atraumatic, anicteric sclerae, pink conjunctivae     No oral ulcers, mucosa moist, throat clear, dentition fair  Neck:  Supple, symmetrical,  thyroid: non tender  Lungs:   Clear to auscultation bilaterally. No Wheezing or Rhonchi. No rales. Chest wall:  No tenderness  No Accessory muscle use.   Heart:   Regular  rhythm,  No  murmur   No edema  Abdomen:   Soft, non-tender. Not distended. Bowel sounds normal  Extremities: No cyanosis. No clubbing,      Skin turgor normal, Capillary refill normal, Radial dial pulse 2+  Skin:     Not pale. Not Jaundiced  No rashes   Psych:  Good insight. Not depressed. Not anxious or agitated. Neurologic: EOMs intact. No facial asymmetry. No aphasia or slurred speech. Symmetrical strength, Sensation grossly intact. Alert and oriented X 4.     _______________________________________________________________________  Care Plan discussed with:    Comments   Patient x    Family      RN x    Care Manager                    Consultant:  arabella Cordova   _______________________________________________________________________  Expected  Disposition:   Home with Family x   HH/PT/OT/RN    SNF/LTC    JEREMY    ________________________________________________________________________  TOTAL TIME:  64 Minutes    Critical Care Provided     Minutes non procedure based      Comments    x Reviewed previous records   >50% of visit spent in counseling and coordination of care x Discussion with patient and/or family and questions answered       ________________________________________________________________________  Signed: Alba Turner MD    Procedures: see electronic medical records for all procedures/Xrays and details which were not copied into this note but were reviewed prior to creation of Plan.     LAB DATA REVIEWED:    Recent Results (from the past 24 hour(s))   CBC WITH AUTOMATED DIFF    Collection Time: 10/23/20  2:26 PM   Result Value Ref Range    WBC 3.8 3.6 - 11.0 K/uL    RBC 4.03 3.80 - 5.20 M/uL    HGB 12.2 11.5 - 16.0 g/dL    HCT 35.9 35.0 - 47.0 %    MCV 89.1 80.0 - 99.0 FL    MCH 30.3 26.0 - 34.0 PG    MCHC 34.0 30.0 - 36.5 g/dL    RDW 13.1 11.5 - 14.5 %    PLATELET 229 395 - 480 K/uL    MPV 11.0 8.9 - 12.9 FL    NRBC 0.0 0  WBC    ABSOLUTE NRBC 0.00 0.00 - 0.01 K/uL    NEUTROPHILS 66 32 - 75 %    BAND NEUTROPHILS 9 % LYMPHOCYTES 19 12 - 49 %    MONOCYTES 5 5 - 13 %    EOSINOPHILS 0 0 - 7 %    BASOPHILS 0 0 - 1 %    METAMYELOCYTES 1 %    IMMATURE GRANULOCYTES 0 0.0 - 0.5 %    ABS. NEUTROPHILS 2.9 1.8 - 8.0 K/UL    ABS. LYMPHOCYTES 0.7 (L) 0.8 - 3.5 K/UL    ABS. MONOCYTES 0.2 0.0 - 1.0 K/UL    ABS. EOSINOPHILS 0.0 0.0 - 0.4 K/UL    ABS. BASOPHILS 0.0 0.0 - 0.1 K/UL    ABS. IMM. GRANS. 0.0 0.00 - 0.04 K/UL    DF MANUAL      RBC COMMENTS NORMOCYTIC, NORMOCHROMIC     METABOLIC PANEL, COMPREHENSIVE    Collection Time: 10/23/20  2:26 PM   Result Value Ref Range    Sodium 138 136 - 145 mmol/L    Potassium 3.2 (L) 3.5 - 5.1 mmol/L    Chloride 99 97 - 108 mmol/L    CO2 31 21 - 32 mmol/L    Anion gap 8 5 - 15 mmol/L    Glucose 104 (H) 65 - 100 mg/dL    BUN 15 6 - 20 MG/DL    Creatinine 0.72 0.55 - 1.02 MG/DL    BUN/Creatinine ratio 21 (H) 12 - 20      GFR est AA >60 >60 ml/min/1.73m2    GFR est non-AA >60 >60 ml/min/1.73m2    Calcium 9.0 8.5 - 10.1 MG/DL    Bilirubin, total 0.5 0.2 - 1.0 MG/DL    ALT (SGPT) 150 (H) 12 - 78 U/L    AST (SGOT) 111 (H) 15 - 37 U/L    Alk. phosphatase 70 45 - 117 U/L    Protein, total 7.4 6.4 - 8.2 g/dL    Albumin 3.1 (L) 3.5 - 5.0 g/dL    Globulin 4.3 (H) 2.0 - 4.0 g/dL    A-G Ratio 0.7 (L) 1.1 - 2.2     SAMPLES BEING HELD    Collection Time: 10/23/20  2:26 PM   Result Value Ref Range    SAMPLES BEING HELD  3 KELLEN, 1 SST, 1 RED, 1 PST     COMMENT        Add-on orders for these samples will be processed based on acceptable specimen integrity and analyte stability, which may vary by analyte. SAMPLES BEING HELD    Collection Time: 10/23/20  2:26 PM   Result Value Ref Range    SAMPLES BEING HELD  1 SST, 1 RED     COMMENT        Add-on orders for these samples will be processed based on acceptable specimen integrity and analyte stability, which may vary by analyte.

## 2020-10-23 NOTE — ED PROVIDER NOTES
EMERGENCY DEPARTMENT HISTORY AND PHYSICAL EXAM      Date: 10/23/2020  Patient Name: Chrissy Guzman    History of Presenting Illness     Chief Complaint   Patient presents with    Shortness of Breath     pt tested positive for covid last Friday after attending a conference. Worsening covid sx.  Fever       History Provided By: Patient    HPI: Chrissy Guzman, 63 y.o. female presents to the ED with cc of worsening COVID symptoms. Patient states that she was at a conference last week, and thinks that she got Covid there. She has subsequently tested positive for Covid. States that her symptoms have progressively worsened with more severe cough, shortness of breath, and daily fevers. Patient denies any chest pain. There are no other complaints, changes, or physical findings at this time. PCP: No primary care provider on file. No current facility-administered medications on file prior to encounter. Current Outpatient Medications on File Prior to Encounter   Medication Sig Dispense Refill    oxyCODONE-acetaminophen (PERCOCET) 5-325 mg per tablet Take 1 Tab by mouth every six (6) hours as needed for Pain. Max Daily Amount: 4 Tabs. 15 Tab 0    gabapentin (NEURONTIN) 300 mg capsule Take 1 Cap by mouth three (3) times daily. 90 Cap 0    duloxetine (CYMBALTA) 60 mg capsule Take 60 mg by mouth two (2) times a day.  hydrocortisone (HYTONE) 2.5 % topical cream Apply  to affected area two (2) times a day. use thin layer 30 g 0    hydrocodone-acetaminophen (NORCO) 5-325 mg per tablet Take 1 tablet by mouth every six (6) hours as needed for Pain for up to 20 doses.  20 tablet 0       Past History     Past Medical History:  Past Medical History:   Diagnosis Date    Fibromyalgia     Kidney stones     passed 67 stones    Other ill-defined conditions(799.89)     kidney stones       Past Surgical History:  Past Surgical History:   Procedure Laterality Date    HX OTHER SURGICAL      lung biopsy Family History:  No family history on file. Social History:  Social History     Tobacco Use    Smoking status: Never Smoker    Smokeless tobacco: Never Used   Substance Use Topics    Alcohol use: No    Drug use: No       Allergies: Allergies   Allergen Reactions    Sulfa (Sulfonamide Antibiotics) Other (comments)     dizziness         Review of Systems   Review of Systems   Constitutional: Positive for chills and fever. HENT: Negative for congestion and rhinorrhea. Eyes: Negative for visual disturbance. Respiratory: Positive for cough and shortness of breath. Negative for chest tightness. Cardiovascular: Negative for chest pain and palpitations. Gastrointestinal: Negative for abdominal pain, diarrhea, nausea and vomiting. Genitourinary: Negative for dysuria, flank pain and hematuria. Musculoskeletal: Negative for back pain and neck pain. Skin: Negative for rash. Allergic/Immunologic: Negative for immunocompromised state. Neurological: Negative for dizziness, speech difficulty, weakness and headaches. Hematological: Negative for adenopathy. Psychiatric/Behavioral: Negative for dysphoric mood and suicidal ideas. Physical Exam   Physical Exam  Vitals signs and nursing note reviewed. Constitutional:       General: She is not in acute distress. Appearance: She is not ill-appearing or toxic-appearing. HENT:      Head: Normocephalic and atraumatic. Nose: Nose normal.      Mouth/Throat:      Mouth: Mucous membranes are moist.   Eyes:      Extraocular Movements: Extraocular movements intact. Pupils: Pupils are equal, round, and reactive to light. Neck:      Musculoskeletal: Normal range of motion and neck supple. Cardiovascular:      Rate and Rhythm: Normal rate and regular rhythm. Pulses: Normal pulses. Pulmonary:      Effort: Pulmonary effort is normal.      Breath sounds: No stridor. No wheezing or rhonchi.    Abdominal:      General: Abdomen is flat. There is no distension. Tenderness: There is no abdominal tenderness. Musculoskeletal: Normal range of motion. Skin:     General: Skin is warm and dry. Neurological:      General: No focal deficit present. Mental Status: She is alert and oriented to person, place, and time. Psychiatric:         Judgment: Judgment normal.         Diagnostic Study Results     Labs -     Recent Results (from the past 12 hour(s))   LACTIC ACID    Collection Time: 10/23/20  7:33 PM   Result Value Ref Range    Lactic acid 0.7 0.4 - 2.0 MMOL/L       Radiologic Studies -   XR CHEST PORT   Final Result   IMPRESSION:    COVID-19 pneumonia. CT Results  (Last 48 hours)    None        CXR Results  (Last 48 hours)               10/23/20 1807  XR CHEST PORT Final result    Impression:  IMPRESSION:    COVID-19 pneumonia. Narrative:  PORTABLE CHEST RADIOGRAPH/S: 10/23/2020 6:07 PM       INDICATION: COVID-19 positive, worsening symptoms. COMPARISON: 9/19/2010. TECHNIQUE: Portable frontal upright radiograph/s of the chest.       FINDINGS:    The lung volumes are shallow. There is hazy interstitial and airspace   consolidation. Findings are consistent with COVID-19 pneumonia. The central   airways are patent. No pneumothorax and no large pleural effusion. Medical Decision Making   I am the first provider for this patient. I reviewed the vital signs, available nursing notes, past medical history, past surgical history, family history and social history. Vital Signs-Reviewed the patient's vital signs.   Patient Vitals for the past 12 hrs:   Temp Pulse Resp BP SpO2   10/23/20 1608 -- -- -- -- 96 %   10/23/20 1608 -- -- -- -- 96 %   10/23/20 1604 -- 91 20 (!) 152/93 92 %   10/23/20 1422 (!) 101.7 °F (38.7 °C) 93 20 (!) 168/64 (!) 88 %       Records Reviewed: Nursing Notes and Old Medical Records    Provider Notes (Medical Decision Making):   81-year-old female presenting for evaluation of worsening shortness of breath in setting of recent COVID-19 diagnosis. Patient is hypoxic on arrival, satting 88% on room air. She was subsequently placed on supplemental O2 with improvement. Patient is also febrile on arrival.    Labs notable only for mild hypokalemia. Chest x-ray with findings consistent with COVID-19 pneumonia. Patient is given Tylenol for fever, hypokalemia supplemented with 40 mEq of KCl. As patient is hypoxic with Covid, 6 mg of oral Decadron given. Inflammatory markers relevant for Covid ordered in anticipation of admission. Patient is discussed with the hospitalist, and she is admitted without incident. ED Course:   Initial assessment performed. The patients presenting problems have been discussed, and they are in agreement with the care plan formulated and outlined with them. I have encouraged them to ask questions as they arise throughout their visit. Karmen Romero MD      Disposition:  Admit      Diagnosis     Clinical Impression:   1. Pneumonia due to COVID-19 virus        Attestations:    Karmen Romero MD    Please note that this dictation was completed with [employer], the computer voice recognition software. Quite often unanticipated grammatical, syntax, homophones, and other interpretive errors are inadvertently transcribed by the computer software. Please disregard these errors. Please excuse any errors that have escaped final proofreading. Thank you.

## 2020-10-24 LAB
ABO + RH BLD: NORMAL
ALBUMIN SERPL-MCNC: 3 G/DL (ref 3.5–5)
ALBUMIN/GLOB SERPL: 0.7 {RATIO} (ref 1.1–2.2)
ALP SERPL-CCNC: 64 U/L (ref 45–117)
ALT SERPL-CCNC: 123 U/L (ref 12–78)
ANION GAP SERPL CALC-SCNC: 6 MMOL/L (ref 5–15)
APTT PPP: 27.6 SEC (ref 22.1–32)
AST SERPL-CCNC: 78 U/L (ref 15–37)
B PERT DNA SPEC QL NAA+PROBE: NOT DETECTED
BASOPHILS # BLD: 0 K/UL (ref 0–0.1)
BASOPHILS NFR BLD: 0 % (ref 0–1)
BILIRUB DIRECT SERPL-MCNC: 0.2 MG/DL (ref 0–0.2)
BILIRUB SERPL-MCNC: 0.5 MG/DL (ref 0.2–1)
BLOOD GROUP ANTIBODIES SERPL: NORMAL
BORDETELLA PARAPERTUSSIS PCR, BORPAR: NOT DETECTED
BUN SERPL-MCNC: 13 MG/DL (ref 6–20)
BUN/CREAT SERPL: 22 (ref 12–20)
C PNEUM DNA SPEC QL NAA+PROBE: NOT DETECTED
CALCIUM SERPL-MCNC: 8.6 MG/DL (ref 8.5–10.1)
CHLORIDE SERPL-SCNC: 100 MMOL/L (ref 97–108)
CO2 SERPL-SCNC: 29 MMOL/L (ref 21–32)
COMMENT, HOLDF: NORMAL
CREAT SERPL-MCNC: 0.59 MG/DL (ref 0.55–1.02)
D DIMER PPP FEU-MCNC: 0.68 MG/L FEU (ref 0–0.65)
DIFFERENTIAL METHOD BLD: ABNORMAL
EOSINOPHIL # BLD: 0 K/UL (ref 0–0.4)
EOSINOPHIL NFR BLD: 0 % (ref 0–7)
ERYTHROCYTE [DISTWIDTH] IN BLOOD BY AUTOMATED COUNT: 13.2 % (ref 11.5–14.5)
FIBRINOGEN PPP-MCNC: 633 MG/DL (ref 200–475)
FLUAV H1 2009 PAND RNA SPEC QL NAA+PROBE: NOT DETECTED
FLUAV H1 RNA SPEC QL NAA+PROBE: NOT DETECTED
FLUAV H3 RNA SPEC QL NAA+PROBE: NOT DETECTED
FLUAV SUBTYP SPEC NAA+PROBE: NOT DETECTED
FLUBV RNA SPEC QL NAA+PROBE: NOT DETECTED
GLOBULIN SER CALC-MCNC: 4.1 G/DL (ref 2–4)
GLUCOSE SERPL-MCNC: 116 MG/DL (ref 65–100)
HADV DNA SPEC QL NAA+PROBE: NOT DETECTED
HCOV 229E RNA SPEC QL NAA+PROBE: NOT DETECTED
HCOV HKU1 RNA SPEC QL NAA+PROBE: NOT DETECTED
HCOV NL63 RNA SPEC QL NAA+PROBE: NOT DETECTED
HCOV OC43 RNA SPEC QL NAA+PROBE: NOT DETECTED
HCT VFR BLD AUTO: 36.9 % (ref 35–47)
HGB BLD-MCNC: 12 G/DL (ref 11.5–16)
HMPV RNA SPEC QL NAA+PROBE: NOT DETECTED
HPIV1 RNA SPEC QL NAA+PROBE: NOT DETECTED
HPIV2 RNA SPEC QL NAA+PROBE: NOT DETECTED
HPIV3 RNA SPEC QL NAA+PROBE: NOT DETECTED
HPIV4 RNA SPEC QL NAA+PROBE: NOT DETECTED
IMM GRANULOCYTES # BLD AUTO: 0 K/UL (ref 0–0.04)
IMM GRANULOCYTES NFR BLD AUTO: 0 % (ref 0–0.5)
INR PPP: 1 (ref 0.9–1.1)
LYMPHOCYTES # BLD: 0.6 K/UL (ref 0.8–3.5)
LYMPHOCYTES NFR BLD: 18 % (ref 12–49)
M PNEUMO DNA SPEC QL NAA+PROBE: NOT DETECTED
MCH RBC QN AUTO: 29.4 PG (ref 26–34)
MCHC RBC AUTO-ENTMCNC: 32.5 G/DL (ref 30–36.5)
MCV RBC AUTO: 90.4 FL (ref 80–99)
MONOCYTES # BLD: 0.2 K/UL (ref 0–1)
MONOCYTES NFR BLD: 7 % (ref 5–13)
NEUTS SEG # BLD: 2.5 K/UL (ref 1.8–8)
NEUTS SEG NFR BLD: 75 % (ref 32–75)
NRBC # BLD: 0 K/UL (ref 0–0.01)
NRBC BLD-RTO: 0 PER 100 WBC
PLATELET # BLD AUTO: 141 K/UL (ref 150–400)
PMV BLD AUTO: 11.4 FL (ref 8.9–12.9)
POTASSIUM SERPL-SCNC: 3.3 MMOL/L (ref 3.5–5.1)
PROT SERPL-MCNC: 7.1 G/DL (ref 6.4–8.2)
PROTHROMBIN TIME: 10.7 SEC (ref 9–11.1)
RBC # BLD AUTO: 4.08 M/UL (ref 3.8–5.2)
RSV RNA SPEC QL NAA+PROBE: NOT DETECTED
RV+EV RNA SPEC QL NAA+PROBE: NOT DETECTED
SAMPLES BEING HELD,HOLD: NORMAL
SODIUM SERPL-SCNC: 135 MMOL/L (ref 136–145)
SPECIMEN EXP DATE BLD: NORMAL
THERAPEUTIC RANGE,PTTT: NORMAL SECS (ref 58–77)
WBC # BLD AUTO: 3.3 K/UL (ref 3.6–11)

## 2020-10-24 PROCEDURE — 82248 BILIRUBIN DIRECT: CPT

## 2020-10-24 PROCEDURE — 87635 SARS-COV-2 COVID-19 AMP PRB: CPT

## 2020-10-24 PROCEDURE — 74011250637 HC RX REV CODE- 250/637: Performed by: INTERNAL MEDICINE

## 2020-10-24 PROCEDURE — 2709999900 HC NON-CHARGEABLE SUPPLY

## 2020-10-24 PROCEDURE — XW033E5 INTRODUCTION OF REMDESIVIR ANTI-INFECTIVE INTO PERIPHERAL VEIN, PERCUTANEOUS APPROACH, NEW TECHNOLOGY GROUP 5: ICD-10-PCS | Performed by: INTERNAL MEDICINE

## 2020-10-24 PROCEDURE — 74011250636 HC RX REV CODE- 250/636: Performed by: INTERNAL MEDICINE

## 2020-10-24 PROCEDURE — 65660000000 HC RM CCU STEPDOWN

## 2020-10-24 PROCEDURE — 36415 COLL VENOUS BLD VENIPUNCTURE: CPT

## 2020-10-24 PROCEDURE — 74011250637 HC RX REV CODE- 250/637: Performed by: NURSE PRACTITIONER

## 2020-10-24 PROCEDURE — 85025 COMPLETE CBC W/AUTO DIFF WBC: CPT

## 2020-10-24 PROCEDURE — 85730 THROMBOPLASTIN TIME PARTIAL: CPT

## 2020-10-24 PROCEDURE — 85610 PROTHROMBIN TIME: CPT

## 2020-10-24 PROCEDURE — 85379 FIBRIN DEGRADATION QUANT: CPT

## 2020-10-24 PROCEDURE — 80053 COMPREHEN METABOLIC PANEL: CPT

## 2020-10-24 PROCEDURE — 86900 BLOOD TYPING SEROLOGIC ABO: CPT

## 2020-10-24 PROCEDURE — 94640 AIRWAY INHALATION TREATMENT: CPT

## 2020-10-24 PROCEDURE — 74011000258 HC RX REV CODE- 258: Performed by: STUDENT IN AN ORGANIZED HEALTH CARE EDUCATION/TRAINING PROGRAM

## 2020-10-24 PROCEDURE — 74011000250 HC RX REV CODE- 250: Performed by: STUDENT IN AN ORGANIZED HEALTH CARE EDUCATION/TRAINING PROGRAM

## 2020-10-24 PROCEDURE — 85384 FIBRINOGEN ACTIVITY: CPT

## 2020-10-24 PROCEDURE — 74011250636 HC RX REV CODE- 250/636: Performed by: NURSE PRACTITIONER

## 2020-10-24 PROCEDURE — 74011000258 HC RX REV CODE- 258: Performed by: INTERNAL MEDICINE

## 2020-10-24 RX ORDER — ENOXAPARIN SODIUM 100 MG/ML
40 INJECTION SUBCUTANEOUS EVERY 12 HOURS
Status: DISCONTINUED | OUTPATIENT
Start: 2020-10-24 | End: 2020-10-29 | Stop reason: HOSPADM

## 2020-10-24 RX ORDER — IBUPROFEN 400 MG/1
400 TABLET ORAL ONCE
Status: COMPLETED | OUTPATIENT
Start: 2020-10-24 | End: 2020-10-24

## 2020-10-24 RX ORDER — HYDROMORPHONE HYDROCHLORIDE 1 MG/ML
1 INJECTION, SOLUTION INTRAMUSCULAR; INTRAVENOUS; SUBCUTANEOUS ONCE
Status: COMPLETED | OUTPATIENT
Start: 2020-10-25 | End: 2020-10-24

## 2020-10-24 RX ORDER — OXYCODONE HYDROCHLORIDE 5 MG/1
2.5 TABLET ORAL
Status: DISCONTINUED | OUTPATIENT
Start: 2020-10-24 | End: 2020-10-24

## 2020-10-24 RX ADMIN — GABAPENTIN 300 MG: 300 CAPSULE ORAL at 09:11

## 2020-10-24 RX ADMIN — DEXAMETHASONE 6 MG: 4 TABLET ORAL at 09:12

## 2020-10-24 RX ADMIN — ALBUTEROL SULFATE 2 PUFF: 90 AEROSOL, METERED RESPIRATORY (INHALATION) at 20:02

## 2020-10-24 RX ADMIN — ALBUTEROL SULFATE 2 PUFF: 90 AEROSOL, METERED RESPIRATORY (INHALATION) at 14:02

## 2020-10-24 RX ADMIN — ENOXAPARIN SODIUM 40 MG: 40 INJECTION SUBCUTANEOUS at 21:11

## 2020-10-24 RX ADMIN — HYDROMORPHONE HYDROCHLORIDE 1 MG: 1 INJECTION, SOLUTION INTRAMUSCULAR; INTRAVENOUS; SUBCUTANEOUS at 23:39

## 2020-10-24 RX ADMIN — GUAIFENESIN 600 MG: 600 TABLET, EXTENDED RELEASE ORAL at 20:20

## 2020-10-24 RX ADMIN — ENOXAPARIN SODIUM 40 MG: 40 INJECTION SUBCUTANEOUS at 09:16

## 2020-10-24 RX ADMIN — CEFTRIAXONE 1 G: 1 INJECTION, POWDER, FOR SOLUTION INTRAMUSCULAR; INTRAVENOUS at 20:20

## 2020-10-24 RX ADMIN — ZINC SULFATE 220 MG (50 MG) CAPSULE 1 CAPSULE: CAPSULE at 09:12

## 2020-10-24 RX ADMIN — Medication 10 ML: at 21:24

## 2020-10-24 RX ADMIN — GABAPENTIN 300 MG: 300 CAPSULE ORAL at 16:44

## 2020-10-24 RX ADMIN — Medication 10 ML: at 14:02

## 2020-10-24 RX ADMIN — IBUPROFEN 400 MG: 400 TABLET, FILM COATED ORAL at 21:12

## 2020-10-24 RX ADMIN — OXYCODONE HYDROCHLORIDE AND ACETAMINOPHEN 1000 MG: 500 TABLET ORAL at 09:12

## 2020-10-24 RX ADMIN — ALBUTEROL SULFATE 2 PUFF: 90 AEROSOL, METERED RESPIRATORY (INHALATION) at 01:35

## 2020-10-24 RX ADMIN — ALBUTEROL SULFATE 2 PUFF: 90 AEROSOL, METERED RESPIRATORY (INHALATION) at 09:15

## 2020-10-24 RX ADMIN — REMDESIVIR 200 MG: 100 INJECTION, POWDER, LYOPHILIZED, FOR SOLUTION INTRAVENOUS at 16:41

## 2020-10-24 RX ADMIN — DULOXETINE HYDROCHLORIDE 60 MG: 30 CAPSULE, DELAYED RELEASE ORAL at 11:02

## 2020-10-24 RX ADMIN — GABAPENTIN 300 MG: 300 CAPSULE ORAL at 21:11

## 2020-10-24 RX ADMIN — Medication 10 ML: at 06:48

## 2020-10-24 RX ADMIN — GUAIFENESIN 600 MG: 600 TABLET, EXTENDED RELEASE ORAL at 09:12

## 2020-10-24 RX ADMIN — DULOXETINE HYDROCHLORIDE 60 MG: 30 CAPSULE, DELAYED RELEASE ORAL at 16:44

## 2020-10-24 NOTE — PROGRESS NOTES
I spoke to patient. She had been exposed to multiple people who got Covid at her [facility], [place], [place]. She started feeling sick about a week ago, and went to test. The test was at an  center at [facility] last Friday. She came in to ED yesterday for a fevr of 103F at home, and worsening SOB. Chart reviewed. AST and ALT are up, but within 5 times normal on the labs on 10/24/20. These will be monitored daily, and Remdesivir stopped per protocol if indicated.        Jaylyn Pederson MD  Infectious Diseases

## 2020-10-24 NOTE — ED NOTES
Bedside shift change report given to [address] [place], [place] [address]ana lilia Pagan (oncoming nurse) by Sunitha Church (offgoing nurse). Report included the following information SBAR, Kardex, ED Summary, [facility] and Recent Results.

## 2020-10-24 NOTE — PROGRESS NOTES
Remdesivir Initiation Note    Reginaldo Gomez meets criteria for initiation of remdesivir under the emergency use authorization (EUA) based on the following:   Known or suspected COVID-19   Severe disease (SpO2 ? 94% on RA, requiring supplemental O2, or requiring invasive mechanical ventilation)   Acceptable renal function  o CrCl ? 30 ml/min based on SCr obtained prior to initiation OR   o CrCl < 30 ml/min but the potential benefit of remdesivir outweighs the risk   Acceptable hepatic function (ALT within 5 times ULN)    I have discussed with the patient/proxy information consistent with the Fact Sheet for Patients and Parents/Caregivers\" and the patient/proxy has agreed to initiating remdesivir after being:   Informed that remdesivir is an unapproved drug that is authorized for use under EUA    Liver function tests will be monitored daily while on remdesivir.       Kev Clay MD  Infectious Diseases

## 2020-10-24 NOTE — CONSULTS
Name: Lottie Ordonez: [address]   : 1960 Admit Date: 10/23/2020   Phone: [phone]  Room: [address] [place] [address]   PCP: Sam Watson MD  MRN: [id number]   Date: 10/24/2020  Code: Full Code        HPI:      Chart and notes reviewed. Data reviewed. I review the patient's current medications in the medical record at each encounter.  I have evaluated and examined the patient. 12:43 PM       History was obtained from patient. I was asked by Shaggy Kincaid MD to see Arnulfo Gómez in consultation for a chief complaint of Covid-19. History of Present Illness:  Keo Garcia is a very pleasant, 62 yo woman that presented with worsening shortness of breath and fevers up to 103. Tells me that she was diagnosed with Covid-19 approximately 10 days ago and prescribed Azithromycin at that time. She felt okay while on Azithromycin but started with increased fevers after she finished it. Reports that she was at a conference after social distancing since March and believes that is where she contracted Covid-19. Denies history of lung disease. Nonsmoker. Has JINA and is compliant with CPAP. Images    CXR personally reviewed. Hazy bilateral infiltrates. WBC 3.3  AlT 123  AST 78      Past Medical History:   Diagnosis Date    Fibromyalgia     Kidney stones     passed 72 stones    Other ill-defined conditions(799.89)     kidney stones       Past Surgical History:   Procedure Laterality Date    HX OTHER SURGICAL      lung biopsy       No family history on file.     Social History     Tobacco Use    Smoking status: Never Smoker    Smokeless tobacco: Never Used   Substance Use Topics    Alcohol use: No       Allergies   Allergen Reactions    Sulfa (Sulfonamide Antibiotics) Other (comments)     dizziness       Current Facility-Administered Medications   Medication Dose Route Frequency    DULoxetine (CYMBALTA) capsule 60 mg  60 mg Oral BID    gabapentin (NEURONTIN) capsule 300 mg  300 mg Oral TID    sodium chloride (NS) flush 5-40 mL  5-40 mL IntraVENous Q8H    sodium chloride (NS) flush 5-40 mL  5-40 mL IntraVENous PRN    enoxaparin (LOVENOX) injection 40 mg  40 mg SubCUTAneous DAILY    ondansetron (ZOFRAN) injection 4 mg  4 mg IntraVENous Q6H PRN    guaiFENesin-dextromethorphan (ROBITUSSIN DM) 100-10 mg/5 mL syrup 5 mL  5 mL Oral Q4H PRN    dexAMETHasone (DECADRON) tablet 6 mg  6 mg Oral DAILY    guaiFENesin ER (MUCINEX) tablet 600 mg  600 mg Oral Q12H    ascorbic acid (vitamin C) (VITAMIN C) tablet 1,000 mg  1,000 mg Oral DAILY    zinc sulfate (ZINCATE) 220 (50) mg capsule 1 Cap  1 Cap Oral DAILY    cefTRIAXone (ROCEPHIN) 1 g in 0.9% sodium chloride (MBP/ADV) 50 mL  1 g IntraVENous Q24H    azithromycin (ZITHROMAX) 500 mg in 0.9% sodium chloride (MBP/ADV) 250 mL  500 mg IntraVENous Q24H    albuterol (PROVENTIL HFA, VENTOLIN HFA, PROAIR HFA) inhaler 2 Puff  2 Puff Inhalation Q6H RT         REVIEW OF SYSTEMS   Negative except as stated in the HPI. Physical Exam:   Visit Vitals  /60   Pulse 80   Temp 98.3 °F (36.8 °C)   Resp 20   Ht 5' 8\" (1.727 m)   Wt 119.1 kg (262 lb 9.1 oz)   SpO2 93%   BMI 39.92 kg/m²       General:  Alert, cooperative, no distress, appears stated age. Head:  Normocephalic, without obvious abnormality, atraumatic. Eyes:  Conjunctivae/corneas clear. Nose: Nares normal. Septum midline. Mucosa normal.    Throat: Lips, mucosa, and tongue normal. Teeth and gums normal.   Neck: Supple, symmetrical, trachea midline, no adenopathy. Back:   Symmetric, no curvature. ROM normal.   Lungs:   Clear to auscultation bilaterally. Chest wall:  No tenderness or deformity. Heart:  Regular rate and rhythm, S1, S2 normal, no murmur, click, rub or gallop. Abdomen:   Soft, non-tender. Bowel sounds normal.    Extremities: Extremities normal, atraumatic, no cyanosis or edema. Pulses: 2+ and symmetric all extremities.    Skin: Skin color, texture, turgor normal. No rashes or lesions   Lymph nodes: Cervical, supraclavicular, and axillary nodes normal.   Neurologic: Grossly nonfocal       Lab Results   Component Value Date/Time    Sodium 135 (L) 10/24/2020 04:16 AM    Potassium 3.3 (L) 10/24/2020 04:16 AM    Chloride 100 10/24/2020 04:16 AM    CO2 29 10/24/2020 04:16 AM    BUN 13 10/24/2020 04:16 AM    Creatinine 0.59 10/24/2020 04:16 AM    Glucose 116 (H) 10/24/2020 04:16 AM    Calcium 8.6 10/24/2020 04:16 AM    Lactic acid 0.7 10/23/2020 07:33 PM       Lab Results   Component Value Date/Time    WBC 3.3 (L) 10/24/2020 04:16 AM    HGB 12.0 10/24/2020 04:16 AM    PLATELET 073 (L) 10/82/6856 04:16 AM    MCV 90.4 10/24/2020 04:16 AM       Lab Results   Component Value Date/Time    INR 1.0 10/24/2020 04:16 AM    aPTT 27.6 10/24/2020 04:16 AM    Alk.  phosphatase 64 10/24/2020 04:16 AM    Protein, total 7.1 10/24/2020 04:16 AM    Albumin 3.0 (L) 10/24/2020 04:16 AM    Globulin 4.1 (H) 10/24/2020 04:16 AM       Lab Results   Component Value Date/Time    Ferritin 97 10/23/2020 02:26 PM       No results found for: SR, CRP, ALEXIS, ANAIGG, RA, RPR, RPRT, VDRLT, VDRLS, TSH, TSHEXT     No results found for: PH, PHI, PCO2, PCO2I, PO2, PO2I, HCO3, HCO3I, FIO2, FIO2I    Lab Results   Component Value Date/Time    Troponin-I, Qt. <0.05 10/23/2020 02:26 PM        Lab Results   Component Value Date/Time    Culture result:  09/19/2010 11:19 PM     MODERATE METHICILLIN RESISTANT STAPHYLOCOCCUS AUREUS        No results found for: TOXA1, RPR, HBCM, HBSAG, HAAB, HCAB1, HAAT, G6PD, CRYAC, HIVGT, HIVR, HIV1, HIV12, HIVPC, HIVRPI    No results found for: VANCT, CPK    Lab Results   Component Value Date/Time    Color YELLOW/STRAW 03/31/2017 01:40 PM    Appearance CLEAR 03/31/2017 01:40 PM    pH (UA) 6.5 03/31/2017 01:40 PM    Protein NEGATIVE  03/31/2017 01:40 PM    Glucose NEGATIVE  03/31/2017 01:40 PM    Ketone NEGATIVE  03/31/2017 01:40 PM    Bilirubin NEGATIVE  03/31/2017 01:40 PM Blood NEGATIVE  03/31/2017 01:40 PM    Urobilinogen 0.2 03/31/2017 01:40 PM    Nitrites NEGATIVE  03/31/2017 01:40 PM    Leukocyte Esterase TRACE (A) 03/31/2017 01:40 PM    WBC 0-4 03/31/2017 01:40 PM    RBC 0-5 03/31/2017 01:40 PM    Bacteria NEGATIVE  03/31/2017 01:40 PM       IMPRESSION  · Acute Respiratory Failure with Hypoxia  · Covid-19 Pneumonia  · Obesity  · JINA on CPAP    PLAN  · Supplemental O2 to keep sats >88%. Currently sats 93% on 3L. · Continue Zithromax/Rocephin  · Start Remdesivir: discussed with patient and she agrees. · Can consider Neil and/or convalescent plasma if she does not improve  · Agree with Dexamethsaone  · Encourage IS use  · Vitamin C, Zinc  · Lovenox  · Trend inflammatory markers    Thank you very much for the consult.       Asha Lares NP

## 2020-10-24 NOTE — PROGRESS NOTES
Pharmacy Automatic Dosing Protocol - VTE prophylaxis, COVID +    Labs:  Recent Labs     10/24/20  0416 10/23/20  1426   CREA 0.59 0.72   BUN 13 15       Creatinine Clearance (mL/min) or Dialysis: 88.4 mL/min (IBW)    Weight: 119 kg  BMI: 39.9    Impression/Plan:   Enoxaparin has been adjusted from q24 to q12 per [address] will follow daily and adjust medications as appropriate.     Thank you,  VAL Phillips

## 2020-10-24 NOTE — PROGRESS NOTES
Hospitalist Progress Note    NAME: Reginaldo Gomez   :  1960   MRN:  [id number]       Assessment / Plan:  Acute respiratory failure with hypoxia POA  Due to bilateral pneumonia due to COVID-19 infection POA  Sepsis POA due to above  Hypokalemia POA  Elevated LFTs POA-likely due to Covid infection related sepsis  Obstructive sleep apnea on CPAP at home  Fibromyalgia POA      Rapid Covid positive at urgent care 10/16/20, reports she took azithromycin for 5 days outpatient  -Currently requiring 4 L oxygen  -Consulted ID and pulmonary  -Discussed with the patient briefly regarding Remdesivir, and she is agreeable for the medication, ID to get full consent  -Dexamethasone 6 mg daily  -s/p 5 days of PO azithro as outpatient, give rocephin her  Albuterol HFA every 6 RT per Covid protocol  Mucinex twice daily   vitamin C, zinc PO   Robitussin-DM as needed cough  Replenish potassium p.o. x1  Follow LFTs avoid Tylenol  Continue home gabapentin  Continue home Cymbalta  Continue home CPAP               Code Status: Full code  Surrogate Decision Maker: Nan Maker  Friend [phone]             DVT Prophylaxis: Subcu Lovenox  GI Prophylaxis: not indicated     Baseline: Patient is independent with ADLs at home     Subjective:     Chief Complaint / Reason for Physician Visit  Patient seen in the ED, currently on 4 L nasal cannula. Reports feeling better than yesterday. Breathing has improved    Review of Systems:  Symptom Y/N Comments  Symptom Y/N Comments   Fever/Chills y   Chest Pain n    Poor Appetite n   Edema n    Cough y   Abdominal Pain n    Sputum n   Joint Pain     SOB/ROY y   Pruritis/Rash     Nausea/vomit n   Tolerating PT/OT     Diarrhea    Tolerating Diet     Constipation    Other       Could NOT obtain due to:      Objective:     VITALS:   Last 24hrs VS reviewed since prior progress note.  Most recent are:  Patient Vitals for the past 24 hrs:   Temp Pulse Resp BP SpO2 10/24/20 1204 98.3 °F (36.8 °C) 80 20 126/60 93 %   10/24/20 1109 98.4 °F (36.9 °C) 87 20 120/78 94 %   10/24/20 0954 -- 85 18 -- 91 %   10/24/20 0953 -- 86 24 -- 93 %   10/24/20 0952 -- 87 26 -- 93 %   10/24/20 0951 -- 86 23 -- 92 %   10/24/20 0950 -- 84 22 -- 91 %   10/24/20 0949 -- 82 23 -- 92 %   10/24/20 0948 -- 81 20 -- 93 %   10/24/20 0947 -- 82 25 -- (!) 86 %   10/24/20 0946 -- 82 24 -- (!) 85 %   10/24/20 0945 -- 82 17 -- 94 %   10/24/20 0944 -- 82 26 -- 93 %   10/24/20 0943 -- 82 28 -- (!) 89 %   10/24/20 0942 -- 82 21 -- (!) 88 %   10/24/20 0941 -- 81 20 -- (!) 89 %   10/24/20 0940 -- 83 21 -- 91 %   10/24/20 0939 -- 82 20 -- 92 %   10/24/20 0938 -- 84 27 -- 90 %   10/24/20 0937 -- 84 21 -- 91 %   10/24/20 0936 -- 82 25 -- (!) 89 %   10/24/20 0935 -- 84 22 -- 92 %   10/24/20 0934 -- 82 27 -- 90 %   10/24/20 0933 -- 84 22 -- 91 %   10/24/20 0932 -- 84 21 -- 91 %   10/24/20 0931 -- 84 22 -- 92 %   10/24/20 0930 -- 81 25 -- 92 %   10/24/20 0929 -- 82 26 -- 92 %   10/24/20 0928 -- 84 27 -- 91 %   10/24/20 0927 -- 83 24 -- 92 %   10/24/20 0926 -- 82 25 -- 91 %   10/24/20 0925 -- 81 26 -- 92 %   10/24/20 0924 -- 81 25 -- 93 %   10/24/20 0923 -- 82 18 -- 93 %   10/24/20 0922 -- 78 20 -- 94 %   10/24/20 0921 -- 78 17 -- 94 %   10/24/20 0920 -- 79 24 -- 97 %   10/24/20 0919 -- 79 17 -- 93 %   10/24/20 0918 -- 81 26 -- 94 %   10/24/20 0917 -- 81 21 -- 93 %   10/24/20 0916 -- 81 21 -- 95 %   10/24/20 0915 -- 83 20 -- 95 %   10/24/20 0914 -- 82 23 -- 95 %   10/24/20 0913 -- 78 16 -- 94 %   10/24/20 0912 -- 75 25 -- 99 %   10/24/20 0911 -- 75 25 -- 94 %   10/24/20 0910 -- 77 25 -- 95 %   10/24/20 0909 -- 73 22 -- 95 %   10/24/20 0908 -- 73 25 -- 94 %   10/24/20 0907 -- 72 23 -- 93 %   10/24/20 0906 -- 75 19 -- 93 %   10/24/20 0905 -- 73 24 -- 93 %   10/24/20 0904 -- 74 24 -- 93 %   10/24/20 0903 -- 74 24 -- 94 %   10/24/20 0902 -- 75 21 -- 94 %   10/24/20 0901 -- 74 20 -- 95 %   10/24/20 0900 -- 75 24 -- 94 % 10/24/20 0859 -- 76 17 -- 96 %   10/24/20 0858 -- 73 18 -- 94 %   10/24/20 0857 -- 75 23 -- 94 %   10/24/20 0856 -- 73 23 -- 94 %   10/24/20 0855 -- 73 23 -- 94 %   10/24/20 0854 -- 73 23 -- 94 %   10/24/20 0853 -- 76 20 -- 94 %   10/24/20 0852 -- 76 25 -- 95 %   10/24/20 0851 -- 76 26 -- 95 %   10/24/20 0850 -- 79 26 -- 96 %   10/24/20 0849 -- 75 28 -- 94 %   10/24/20 0848 -- 76 22 -- 96 %   10/24/20 0847 -- 76 21 -- 96 %   10/24/20 0846 -- 75 25 -- 94 %   10/24/20 0845 -- 75 24 -- 94 %   10/24/20 0844 -- 76 22 -- 94 %   10/24/20 0843 -- 77 16 -- 97 %   10/24/20 0842 -- 76 17 -- 96 %   10/24/20 0841 -- 75 24 -- 95 %   10/24/20 0840 -- 77 20 -- 96 %   10/24/20 0839 -- 76 21 -- 96 %   10/24/20 0838 -- 79 17 -- 96 %   10/24/20 0837 -- 78 20 -- 96 %   10/24/20 0836 -- 78 25 -- 97 %   10/24/20 0835 -- 76 22 -- 98 %   10/24/20 0834 -- 79 21 -- 95 %   10/24/20 0833 -- 74 23 -- 94 %   10/24/20 0832 -- 77 23 -- 94 %   10/24/20 0831 -- 77 26 -- 94 %   10/24/20 0830 -- 77 21 -- 97 %   10/24/20 0829 -- 78 24 -- 95 %   10/24/20 0828 -- 77 24 -- 96 %   10/24/20 0827 -- 79 19 -- 96 %   10/24/20 0826 -- 72 21 -- 92 %   10/24/20 0825 -- 73 21 -- 92 %   10/24/20 0824 -- 73 23 -- 92 %   10/24/20 0823 -- 73 20 -- 92 %   10/24/20 0822 -- 75 23 -- 91 %   10/24/20 0821 -- 73 23 -- 92 %   10/24/20 0820 -- 76 24 -- 92 %   10/24/20 0819 -- 75 25 -- 95 %   10/24/20 0818 -- 77 24 -- 93 %   10/24/20 0817 -- 75 23 -- 95 %   10/24/20 0816 -- 73 22 -- 92 %   10/24/20 0815 -- 77 24 -- 92 %   10/24/20 0814 -- 75 23 -- 92 %   10/24/20 0813 -- 75 24 -- 95 %   10/24/20 0812 -- 77 23 -- 94 %   10/24/20 0811 -- 75 21 -- 96 %   10/24/20 0810 -- 76 24 -- 94 %   10/24/20 0809 -- 78 17 -- 93 %   10/24/20 0808 -- 76 21 -- 92 %   10/24/20 0807 -- 73 24 -- 93 %   10/24/20 0806 -- 78 15 -- 94 %   10/24/20 0805 -- 76 23 -- 93 %   10/24/20 0804 -- 76 22 -- 94 %   10/24/20 0803 -- 75 25 -- 95 %   10/24/20 0802 -- 72 23 -- 91 %   10/24/20 0801 -- 75 20 -- 92 % 10/24/20 0800 -- 78 27 -- 91 %   10/24/20 0759 -- 76 22 -- 92 %   10/24/20 0758 -- 75 22 -- 91 %   10/24/20 0757 -- 73 23 -- 92 %   10/24/20 0756 -- 74 24 -- 93 %   10/24/20 0755 -- 73 23 -- 93 %   10/24/20 0754 -- 76 25 -- 93 %   10/24/20 0753 -- 74 24 -- 95 %   10/24/20 0752 -- 73 22 -- 90 %   10/24/20 0751 -- 73 24 -- 91 %   10/24/20 0750 -- 75 23 -- 91 %   10/24/20 0749 -- 73 21 -- 91 %   10/24/20 0748 -- 72 24 -- 91 %   10/24/20 0747 -- 71 24 -- 92 %   10/24/20 0746 -- 76 20 -- 91 %   10/24/20 0745 -- 76 24 -- 91 %   10/24/20 0744 -- 73 24 -- 91 %   10/24/20 0743 -- 76 23 -- 91 %   10/24/20 0742 -- 78 21 -- 91 %   10/24/20 0741 -- 74 24 -- 92 %   10/24/20 0740 -- 76 25 -- 92 %   10/24/20 0739 -- 76 27 -- 92 %   10/24/20 0738 -- 78 28 -- 94 %   10/24/20 0737 -- 76 25 -- 95 %   10/24/20 0736 -- 77 24 -- 92 %   10/24/20 0735 -- 77 25 -- 92 %   10/24/20 0734 -- 77 22 -- 93 %   10/24/20 0733 -- 77 22 -- 92 %   10/24/20 0732 -- 77 12 -- 93 %   10/24/20 0731 -- 78 15 -- 93 %   10/24/20 0730 -- 76 27 -- 92 %   10/24/20 0729 -- 79 29 -- 92 %   10/24/20 0728 -- 77 16 -- 93 %   10/24/20 0727 -- 78 26 -- 92 %   10/24/20 0726 -- 78 25 -- 93 %   10/24/20 0725 -- 78 24 -- 93 %   10/24/20 0724 -- 78 19 -- 94 %   10/24/20 0723 -- 79 24 -- 92 %   10/24/20 0722 -- 79 24 -- 93 %   10/24/20 0721 -- 77 22 -- 93 %   10/24/20 0720 -- 80 25 -- 93 %   10/24/20 0719 -- 78 22 -- 92 %   10/24/20 0718 -- 77 27 -- 92 %   10/24/20 0717 -- 77 23 -- 91 %   10/24/20 0716 -- 78 22 -- 93 %   10/24/20 0715 -- 78 23 -- 94 %   10/24/20 0714 -- 80 26 -- 94 %   10/24/20 0713 -- 81 25 -- 95 %   10/24/20 0712 -- 81 24 -- 92 %   10/24/20 0711 -- 85 23 -- 92 %   10/24/20 0710 -- 84 21 -- 91 %   10/24/20 0709 -- 79 21 -- 92 %   10/24/20 0708 -- 77 22 -- 91 %   10/24/20 0707 -- 79 19 -- 90 %   10/24/20 0706 -- 77 24 -- 90 %   10/24/20 0705 -- 76 24 -- (!) 89 %   10/24/20 0704 -- 76 21 -- (!) 88 %   10/24/20 0703 -- 76 23 -- 90 %   10/24/20 0702 -- 78 26 -- 90 %   10/24/20 0701 -- 79 22 -- 91 %   10/24/20 0700 98.7 °F (37.1 °C) 79 26 120/83 93 %   10/24/20 0658 -- 80 21 -- 96 %   10/24/20 0657 -- 85 23 -- 93 %   10/24/20 0656 -- 88 21 -- 90 %   10/24/20 0655 -- 86 19 -- 94 %   10/24/20 0654 -- 77 21 -- 93 %   10/24/20 0653 -- 78 23 -- 96 %   10/24/20 0652 -- 79 26 -- 94 %   10/24/20 0651 -- 78 23 -- 94 %   10/24/20 0650 -- 81 22 -- 93 %   10/24/20 0649 -- 80 23 -- 96 %   10/24/20 0648 -- 71 25 -- 94 %   10/24/20 0647 -- 72 23 -- 93 %   10/24/20 0646 -- 71 22 -- 96 %   10/24/20 0645 -- 70 23 -- 94 %   10/24/20 0644 -- 71 21 -- 92 %   10/24/20 0643 -- 74 21 -- 96 %   10/24/20 0642 -- 70 18 -- 91 %   10/24/20 0641 -- 71 22 -- (!) 88 %   10/24/20 0640 -- 69 17 -- 92 %   10/24/20 0639 -- 68 21 -- 94 %   10/24/20 0638 -- 69 19 -- 92 %   10/24/20 0637 -- 69 21 -- (!) 89 %   10/24/20 0636 -- 71 21 -- (!) 88 %   10/24/20 0635 -- 70 23 -- (!) 88 %   10/24/20 0634 -- 68 23 -- (!) 89 %   10/24/20 0633 -- 71 21 -- (!) 88 %   10/24/20 0632 -- 73 19 -- (!) 88 %   10/24/20 0631 -- 72 20 -- 91 %   10/24/20 0630 -- 74 20 -- 91 %   10/24/20 0629 -- 75 23 -- 90 %   10/24/20 0628 -- 73 20 -- (!) 89 %   10/24/20 0627 -- 73 22 -- 91 %   10/24/20 0626 -- 74 23 -- 91 %   10/24/20 0625 -- 73 20 -- 94 %   10/24/20 0624 -- 74 22 -- 94 %   10/24/20 0623 -- 75 17 -- 95 %   10/24/20 0622 -- 71 21 -- 95 %   10/24/20 0621 -- 70 21 -- 96 %   10/24/20 0620 -- 70 20 -- 95 %   10/24/20 0619 -- 72 21 -- 96 %   10/24/20 0618 -- 70 21 -- 94 %   10/24/20 0617 -- 69 22 -- 96 %   10/24/20 0616 -- 69 19 -- 95 %   10/24/20 0615 -- 71 21 -- 96 %   10/24/20 0614 -- 69 20 -- 94 %   10/24/20 0613 -- 70 21 -- 94 %   10/24/20 0612 -- 71 22 -- 94 %   10/24/20 0611 -- 72 21 -- 94 %   10/24/20 0610 -- 72 21 -- 95 %   10/24/20 0609 -- 73 21 -- 94 %   10/24/20 0608 -- 73 22 -- 94 %   10/24/20 0607 -- 72 22 -- 94 %   10/24/20 0606 -- 76 21 -- 94 %   10/24/20 0605 -- 73 23 -- 94 %   10/24/20 0604 -- 72 21 -- 94 %   10/24/20 0603 -- 76 21 -- 95 %   10/24/20 0602 -- 74 22 -- 97 %   10/24/20 0601 -- 72 21 -- 95 %   10/24/20 0600 -- 74 21 -- 95 %   10/24/20 0559 -- 72 20 -- 94 %   10/24/20 0558 -- 73 23 -- 95 %   10/24/20 0557 -- 73 22 -- 95 %   10/24/20 0556 -- 71 20 -- 95 %   10/24/20 0555 -- 71 20 -- 95 %   10/24/20 0554 -- 72 20 -- 94 %   10/24/20 0553 -- 72 20 -- 96 %   10/24/20 0552 -- 69 21 -- 95 %   10/24/20 0551 -- 71 22 -- 96 %   10/24/20 0550 -- 70 21 -- 97 %   10/24/20 0549 -- 69 21 -- 94 %   10/24/20 0548 -- 70 21 -- 94 %   10/24/20 0547 -- 71 23 -- 94 %   10/24/20 0546 -- 70 21 -- 96 %   10/24/20 0545 -- 72 21 -- 94 %   10/24/20 0544 -- 73 18 -- 93 %   10/24/20 0543 -- 70 22 -- 93 %   10/24/20 0542 -- 72 20 -- (!) 89 %   10/24/20 0541 -- 73 25 -- 92 %   10/24/20 0540 -- 73 18 -- 95 %   10/24/20 0539 -- 71 22 -- 95 %   10/24/20 0538 -- 75 18 -- 93 %   10/24/20 0537 -- 71 20 -- 94 %   10/24/20 0536 -- 70 21 -- 94 %   10/24/20 0535 -- 73 22 -- 94 %   10/24/20 0534 -- 72 21 -- 95 %   10/24/20 0533 -- 73 24 -- 93 %   10/24/20 0532 -- 71 23 -- 95 %   10/24/20 0531 -- 71 21 -- 95 %   10/24/20 0530 -- 73 20 -- 95 %   10/24/20 0529 -- 72 21 -- 95 %   10/24/20 0528 -- 73 23 -- 95 %   10/24/20 0527 -- 74 22 -- 95 %   10/24/20 0526 -- 74 22 -- 94 %   10/24/20 0525 -- 72 21 -- 95 %   10/24/20 0524 -- 72 22 -- 96 %   10/24/20 0523 -- 72 19 -- 95 %   10/24/20 0522 -- 74 22 -- 95 %   10/24/20 0521 -- 76 20 -- 95 %   10/24/20 0520 -- 79 24 -- 94 %   10/24/20 0519 -- 81 24 -- 95 %   10/24/20 0518 -- 74 23 -- 94 %   10/24/20 0517 -- 76 23 -- 95 %   10/24/20 0516 -- 75 23 -- 94 %   10/24/20 0515 -- 74 21 -- 95 %   10/24/20 0514 -- 75 23 -- 95 %   10/24/20 0513 -- 75 23 -- 95 %   10/24/20 0512 -- 76 24 -- 95 %   10/24/20 0511 -- 76 20 -- 95 %   10/24/20 0510 -- 75 22 -- 95 %   10/24/20 0509 -- 78 22 -- 96 %   10/24/20 0508 -- 76 25 -- 95 %   10/24/20 0507 -- 76 24 -- 96 %   10/24/20 0506 -- 78 23 -- 95 %   10/24/20 0505 -- 77 24 -- 94 % 10/24/20 0504 -- 78 23 -- 95 %   10/24/20 0503 -- 78 20 -- 94 %   10/24/20 0502 -- 77 22 -- 96 %   10/24/20 0501 -- 76 20 -- 96 %   10/24/20 0500 -- 76 22 -- 94 %   10/24/20 0459 -- 77 19 -- 93 %   10/24/20 0458 -- 78 21 -- 95 %   10/24/20 0457 -- 78 20 -- 95 %   10/24/20 0456 -- 78 25 -- 96 %   10/24/20 0455 -- 73 22 -- 94 %   10/24/20 0454 -- 78 21 -- 93 %   10/24/20 0453 -- 77 22 -- 94 %   10/24/20 0452 -- 77 23 -- 94 %   10/24/20 0451 -- 79 23 -- 94 %   10/24/20 0450 -- 79 25 -- 95 %   10/24/20 0449 -- 79 25 -- 96 %   10/24/20 0448 -- 75 24 -- 93 %   10/24/20 0447 -- 79 23 -- 93 %   10/24/20 0446 -- 78 24 -- 94 %   10/24/20 0445 -- 78 22 -- 94 %   10/24/20 0444 -- 80 25 -- 95 %   10/24/20 0443 -- 78 18 -- 94 %   10/24/20 0442 -- 81 24 -- 94 %   10/24/20 0441 -- 78 23 -- 95 %   10/24/20 0440 -- 80 24 -- 97 %   10/24/20 0439 -- 78 19 -- 95 %   10/24/20 0438 -- 78 18 -- 95 %   10/24/20 0437 -- 77 22 -- 95 %   10/24/20 0436 -- 79 23 -- 94 %   10/24/20 0435 -- 79 20 -- 95 %   10/24/20 0434 -- 82 18 -- 95 %   10/24/20 0433 -- 81 23 -- 95 %   10/24/20 0432 -- 83 22 -- 95 %   10/24/20 0431 -- 80 23 -- 95 %   10/24/20 0430 -- 80 22 -- 96 %   10/24/20 0429 -- 81 19 -- 96 %   10/24/20 0428 -- 76 22 -- 93 %   10/24/20 0427 -- 72 20 -- 92 %   10/24/20 0426 -- 73 21 -- 92 %   10/24/20 0425 -- 73 19 -- 93 %   10/24/20 0424 -- 74 19 -- 93 %   10/24/20 0423 -- 74 21 -- 93 %   10/24/20 0422 -- 74 22 -- 93 %   10/24/20 0421 -- 80 22 -- 94 %   10/24/20 0420 -- 80 20 -- 96 %   10/24/20 0419 -- 72 21 -- 92 %   10/24/20 0418 -- 72 21 -- 91 %   10/24/20 0417 -- 72 20 -- 91 %   10/24/20 0416 -- 72 21 -- 92 %   10/24/20 0415 -- 72 20 -- 91 %   10/24/20 0414 -- 72 20 -- 91 %   10/24/20 0413 -- 73 22 -- 92 %   10/24/20 0412 -- 73 22 -- 94 %   10/24/20 0411 -- 73 22 -- 91 %   10/24/20 0410 -- 73 22 -- 92 %   10/24/20 0400 -- 74 23 -- (!) 77 %   10/24/20 0318 98.2 °F (36.8 °C) -- -- -- --   10/24/20 0300 -- 77 22 119/83 (!) 86 %   10/24/20 0200 -- 80 24 126/67 93 %   10/24/20 0100 -- -- -- (!) 145/57 93 %   10/24/20 0024 98.4 °F (36.9 °C) -- -- -- --   10/23/20 1608 -- -- -- -- 96 %   10/23/20 1608 -- -- -- -- 96 %   10/23/20 1604 -- 91 20 (!) 152/93 92 %   10/23/20 1422 (!) 101.7 °F (38.7 °C) 93 20 (!) 168/64 (!) 88 %       Intake/Output Summary (Last 24 hours) at 10/24/2020 1244  Last data filed at 10/24/2020 0950  Gross per 24 hour   Intake 540 ml   Output --   Net 540 ml        PHYSICAL EXAM:  General: WD, WN. Alert, cooperative, no acute distress    EENT:  EOMI. Anicteric sclerae. MMM  Resp:  CTA bilaterally, no wheezing or rales. No accessory muscle use  CV:  Regular  rhythm,  No edema  GI:  Soft, Non distended, Non tender.  +Bowel sounds  Neurologic:  Alert and oriented X 3, normal speech,   Psych:   Good insight. Not anxious nor agitated  Skin:  No rashes. No jaundice    Reviewed most current lab test results and cultures  YES  Reviewed most current radiology test results   YES  Review and summation of old records today    NO  Reviewed patient's current orders and MAR    YES  PMH/ reviewed - no change compared to H&P  ________________________________________________________________________  Care Plan discussed with:    Comments   Patient x    Family      RN x    Care Manager     Consultant                        Multidiciplinary team rounds were held today with , nursing, pharmacist and clinical coordinator. Patient's plan of care was discussed; medications were reviewed and discharge planning was addressed.      ________________________________________________________________________  Total NON critical care TIME:  40   Minutes    Total CRITICAL CARE TIME Spent:   Minutes non procedure based      Comments   >50% of visit spent in counseling and coordination of care     ________________________________________________________________________  Erin Padilla MD     Procedures: see electronic medical records for all procedures/Xrays and details which were not copied into this note but were reviewed prior to creation of Plan. LABS:  I reviewed today's most current labs and imaging studies.   Pertinent labs include:  Recent Labs     10/24/20  0416 10/23/20  1426   WBC 3.3* 3.8   HGB 12.0 12.2   HCT 36.9 35.9   * 189     Recent Labs     10/24/20  0416 10/23/20  1426   * 138   K 3.3* 3.2*    99   CO2 29 31   * 104*   BUN 13 15   CREA 0.59 0.72   CA 8.6 9.0   ALB 3.0* 3.1*   TBILI 0.5 0.5   * 150*   INR 1.0 1.0       Signed: Gil Lu MD

## 2020-10-24 NOTE — PROGRESS NOTES
Remdesivir Initiation Note    Otto Madera meets criteria for initiation of remdesivir under the emergency use authorization (EUA) based on the following:   Known or suspected COVID-19   Severe disease (SpO2 ? 94% on RA, requiring supplemental O2, or requiring invasive mechanical ventilation)   Acceptable renal function  o CrCl ? 30 ml/min based on SCr obtained prior to initiation OR   o CrCl < 30 ml/min but the potential benefit of remdesivir outweighs the risk   Acceptable hepatic function (ALT within 5 times ULN)    I have discussed with the patient/proxy information consistent with the Fact Sheet for Patients and Parents/Caregivers\" and the patient/proxy has agreed to initiating remdesivir after being:   Given the Fact Sheet for Patients and Parents/Caregivers   Informed of the alternatives to receiving remdesivir, and    Informed that remdesivir is an unapproved drug that is authorized for use under EUA    Liver function tests will be monitored daily while on remdesivir.

## 2020-10-25 LAB
ALBUMIN SERPL-MCNC: 2.8 G/DL (ref 3.5–5)
ALBUMIN/GLOB SERPL: 0.7 {RATIO} (ref 1.1–2.2)
ALP SERPL-CCNC: 60 U/L (ref 45–117)
ALT SERPL-CCNC: 86 U/L (ref 12–78)
ANION GAP SERPL CALC-SCNC: 5 MMOL/L (ref 5–15)
APTT PPP: 27 SEC (ref 22.1–32)
AST SERPL-CCNC: 42 U/L (ref 15–37)
BASOPHILS # BLD: 0 K/UL (ref 0–0.1)
BASOPHILS NFR BLD: 0 % (ref 0–1)
BILIRUB SERPL-MCNC: 0.4 MG/DL (ref 0.2–1)
BUN SERPL-MCNC: 14 MG/DL (ref 6–20)
BUN/CREAT SERPL: 22 (ref 12–20)
CALCIUM SERPL-MCNC: 8.9 MG/DL (ref 8.5–10.1)
CHLORIDE SERPL-SCNC: 101 MMOL/L (ref 97–108)
CO2 SERPL-SCNC: 33 MMOL/L (ref 21–32)
CREAT SERPL-MCNC: 0.64 MG/DL (ref 0.55–1.02)
D DIMER PPP FEU-MCNC: 0.58 MG/L FEU (ref 0–0.65)
DIFFERENTIAL METHOD BLD: ABNORMAL
EOSINOPHIL # BLD: 0 K/UL (ref 0–0.4)
EOSINOPHIL NFR BLD: 0 % (ref 0–7)
ERYTHROCYTE [DISTWIDTH] IN BLOOD BY AUTOMATED COUNT: 13 % (ref 11.5–14.5)
FERRITIN SERPL-MCNC: 97 NG/ML (ref 26–388)
FIBRINOGEN PPP-MCNC: 542 MG/DL (ref 200–475)
GLOBULIN SER CALC-MCNC: 4.2 G/DL (ref 2–4)
GLUCOSE SERPL-MCNC: 94 MG/DL (ref 65–100)
HCT VFR BLD AUTO: 35 % (ref 35–47)
HGB BLD-MCNC: 11.2 G/DL (ref 11.5–16)
IMM GRANULOCYTES # BLD AUTO: 0 K/UL (ref 0–0.04)
IMM GRANULOCYTES NFR BLD AUTO: 0 % (ref 0–0.5)
INR PPP: 1 (ref 0.9–1.1)
LDH SERPL L TO P-CCNC: 260 U/L (ref 81–246)
LYMPHOCYTES # BLD: 1 K/UL (ref 0.8–3.5)
LYMPHOCYTES NFR BLD: 20 % (ref 12–49)
MCH RBC QN AUTO: 28.9 PG (ref 26–34)
MCHC RBC AUTO-ENTMCNC: 32 G/DL (ref 30–36.5)
MCV RBC AUTO: 90.2 FL (ref 80–99)
MONOCYTES # BLD: 0.4 K/UL (ref 0–1)
MONOCYTES NFR BLD: 7 % (ref 5–13)
MYELOCYTES NFR BLD MANUAL: 1 %
NEUTS BAND NFR BLD MANUAL: 2 %
NEUTS SEG # BLD: 3.7 K/UL (ref 1.8–8)
NEUTS SEG NFR BLD: 70 % (ref 32–75)
NRBC # BLD: 0 K/UL (ref 0–0.01)
NRBC BLD-RTO: 0 PER 100 WBC
PLATELET # BLD AUTO: 198 K/UL (ref 150–400)
PMV BLD AUTO: 10.7 FL (ref 8.9–12.9)
POTASSIUM SERPL-SCNC: 3 MMOL/L (ref 3.5–5.1)
PROCALCITONIN SERPL-MCNC: <0.05 NG/ML
PROT SERPL-MCNC: 7 G/DL (ref 6.4–8.2)
PROTHROMBIN TIME: 10.3 SEC (ref 9–11.1)
RBC # BLD AUTO: 3.88 M/UL (ref 3.8–5.2)
RBC MORPH BLD: ABNORMAL
SODIUM SERPL-SCNC: 139 MMOL/L (ref 136–145)
THERAPEUTIC RANGE,PTTT: NORMAL SECS (ref 58–77)
WBC # BLD AUTO: 5.2 K/UL (ref 3.6–11)

## 2020-10-25 PROCEDURE — 36415 COLL VENOUS BLD VENIPUNCTURE: CPT

## 2020-10-25 PROCEDURE — 94760 N-INVAS EAR/PLS OXIMETRY 1: CPT

## 2020-10-25 PROCEDURE — 74011250637 HC RX REV CODE- 250/637: Performed by: INTERNAL MEDICINE

## 2020-10-25 PROCEDURE — 74011000250 HC RX REV CODE- 250: Performed by: STUDENT IN AN ORGANIZED HEALTH CARE EDUCATION/TRAINING PROGRAM

## 2020-10-25 PROCEDURE — 65660000000 HC RM CCU STEPDOWN

## 2020-10-25 PROCEDURE — 77010033678 HC OXYGEN DAILY

## 2020-10-25 PROCEDURE — 85384 FIBRINOGEN ACTIVITY: CPT

## 2020-10-25 PROCEDURE — 74011250636 HC RX REV CODE- 250/636: Performed by: INTERNAL MEDICINE

## 2020-10-25 PROCEDURE — 77030012341 HC CHMB SPCR OPTC MDI VYRM -A

## 2020-10-25 PROCEDURE — 83615 LACTATE (LD) (LDH) ENZYME: CPT

## 2020-10-25 PROCEDURE — 74011000258 HC RX REV CODE- 258: Performed by: INTERNAL MEDICINE

## 2020-10-25 PROCEDURE — 85379 FIBRIN DEGRADATION QUANT: CPT

## 2020-10-25 PROCEDURE — 85025 COMPLETE CBC W/AUTO DIFF WBC: CPT

## 2020-10-25 PROCEDURE — 80053 COMPREHEN METABOLIC PANEL: CPT

## 2020-10-25 PROCEDURE — 74011000258 HC RX REV CODE- 258: Performed by: STUDENT IN AN ORGANIZED HEALTH CARE EDUCATION/TRAINING PROGRAM

## 2020-10-25 PROCEDURE — 94640 AIRWAY INHALATION TREATMENT: CPT

## 2020-10-25 PROCEDURE — 85610 PROTHROMBIN TIME: CPT

## 2020-10-25 PROCEDURE — 84145 PROCALCITONIN (PCT): CPT

## 2020-10-25 PROCEDURE — 82728 ASSAY OF FERRITIN: CPT

## 2020-10-25 PROCEDURE — 85730 THROMBOPLASTIN TIME PARTIAL: CPT

## 2020-10-25 RX ORDER — ACETAMINOPHEN 325 MG/1
650 TABLET ORAL
Status: DISCONTINUED | OUTPATIENT
Start: 2020-10-25 | End: 2020-10-29 | Stop reason: HOSPADM

## 2020-10-25 RX ORDER — POTASSIUM CHLORIDE 20 MEQ/1
20 TABLET, EXTENDED RELEASE ORAL ONCE
Status: COMPLETED | OUTPATIENT
Start: 2020-10-25 | End: 2020-10-25

## 2020-10-25 RX ORDER — POTASSIUM CHLORIDE 20 MEQ/1
40 TABLET, EXTENDED RELEASE ORAL ONCE
Status: COMPLETED | OUTPATIENT
Start: 2020-10-25 | End: 2020-10-25

## 2020-10-25 RX ADMIN — GUAIFENESIN AND DEXTROMETHORPHAN 5 ML: 100; 10 SYRUP ORAL at 17:01

## 2020-10-25 RX ADMIN — ALBUTEROL SULFATE 2 PUFF: 90 AEROSOL, METERED RESPIRATORY (INHALATION) at 21:25

## 2020-10-25 RX ADMIN — GUAIFENESIN 600 MG: 600 TABLET, EXTENDED RELEASE ORAL at 08:42

## 2020-10-25 RX ADMIN — POTASSIUM CHLORIDE 40 MEQ: 1500 TABLET, EXTENDED RELEASE ORAL at 08:42

## 2020-10-25 RX ADMIN — DEXAMETHASONE 6 MG: 4 TABLET ORAL at 08:42

## 2020-10-25 RX ADMIN — ENOXAPARIN SODIUM 40 MG: 40 INJECTION SUBCUTANEOUS at 22:19

## 2020-10-25 RX ADMIN — GABAPENTIN 300 MG: 300 CAPSULE ORAL at 17:01

## 2020-10-25 RX ADMIN — ALBUTEROL SULFATE 2 PUFF: 90 AEROSOL, METERED RESPIRATORY (INHALATION) at 13:16

## 2020-10-25 RX ADMIN — POTASSIUM CHLORIDE 20 MEQ: 1500 TABLET, EXTENDED RELEASE ORAL at 17:01

## 2020-10-25 RX ADMIN — ENOXAPARIN SODIUM 40 MG: 40 INJECTION SUBCUTANEOUS at 11:35

## 2020-10-25 RX ADMIN — DULOXETINE HYDROCHLORIDE 60 MG: 30 CAPSULE, DELAYED RELEASE ORAL at 17:01

## 2020-10-25 RX ADMIN — DULOXETINE HYDROCHLORIDE 60 MG: 30 CAPSULE, DELAYED RELEASE ORAL at 08:42

## 2020-10-25 RX ADMIN — ALBUTEROL SULFATE 2 PUFF: 90 AEROSOL, METERED RESPIRATORY (INHALATION) at 02:01

## 2020-10-25 RX ADMIN — Medication 10 ML: at 11:38

## 2020-10-25 RX ADMIN — ALBUTEROL SULFATE 2 PUFF: 90 AEROSOL, METERED RESPIRATORY (INHALATION) at 08:38

## 2020-10-25 RX ADMIN — REMDESIVIR 100 MG: 100 INJECTION, POWDER, LYOPHILIZED, FOR SOLUTION INTRAVENOUS at 17:02

## 2020-10-25 RX ADMIN — ONDANSETRON 4 MG: 2 INJECTION INTRAMUSCULAR; INTRAVENOUS at 22:25

## 2020-10-25 RX ADMIN — Medication 10 ML: at 05:13

## 2020-10-25 RX ADMIN — Medication 10 ML: at 22:20

## 2020-10-25 RX ADMIN — GUAIFENESIN AND DEXTROMETHORPHAN 5 ML: 100; 10 SYRUP ORAL at 08:42

## 2020-10-25 RX ADMIN — GUAIFENESIN 600 MG: 600 TABLET, EXTENDED RELEASE ORAL at 22:20

## 2020-10-25 RX ADMIN — CEFTRIAXONE 1 G: 1 INJECTION, POWDER, FOR SOLUTION INTRAMUSCULAR; INTRAVENOUS at 19:51

## 2020-10-25 RX ADMIN — GABAPENTIN 300 MG: 300 CAPSULE ORAL at 22:20

## 2020-10-25 RX ADMIN — OXYCODONE HYDROCHLORIDE AND ACETAMINOPHEN 1000 MG: 500 TABLET ORAL at 08:42

## 2020-10-25 RX ADMIN — ZINC SULFATE 220 MG (50 MG) CAPSULE 1 CAPSULE: CAPSULE at 08:42

## 2020-10-25 RX ADMIN — GABAPENTIN 300 MG: 300 CAPSULE ORAL at 08:43

## 2020-10-25 NOTE — PROGRESS NOTES
Reason for Admission:   Pneumonia due to COVID-19 virus, Acute respiratory failure with hypoxia, hypokalemia, elevated LFT's                   RUR Score:          10%           Plan for utilizing home health:     None at this time     PCP: First and Last name:  Pt is seen by PCP in [place], [place]. Pt declining referral to local PCP   Name of Practice:    Are you a current patient: Yes/No:    Approximate date of last visit:    Can you participate in a virtual visit with your PCP:                     Current Advanced Directive/Advance Care Plan: Pt does not have an advance directive on chart. CM offered education re: advance directives. Pt reports she does not wish to complete an advance directive, but will consider filling one out at a later time. CM informed pt that  service will continue to remain available for assistance. Transition of Care Plan:                      Pt is a 64 y/o female who was admitted with a diagnosis of Pneumonia due to COVID-19 virus, Acute respiratory failure with hypoxia, hypokalemia, elevated LFT's. CM called pt via room phone re: assessment, discharge planning. CM introduced self, explained role. Pt verbalized understanding. Pt verified demographic information on chart and reports no changes need to be made at this time. Pt lives in a 1 story home with 4 steps to enter with the [facility]. Pt is independent in ADL/IADL needs at baseline. Pt does not have access to DME in the home. Pt has not utilized SNF or home health prior to admission. Pt does drive at baseline. Pts family to transport at discharge. Pt plans on returning home at discharge. Pt reports no further questions or needs at this time. CM will continue to remain available for support, discharge planning as needed. DANNY:  Home with family  Family to transport    Care Management Interventions  PCP Verified by CM: Yes  Mode of Transport at Discharge:  Other (see comment)(PT's nehpew to transport)  Transition of Care Consult (CM Consult): Discharge Planning(Pt is indepenent in ADL/IADL needs.  No HH or SNF experience)  MyChart Signup: No  Discharge Durable Medical Equipment: No(Pt does not have DME in the home)  Physical Therapy Consult: No  Occupational Therapy Consult: No  Speech Therapy Consult: No  Current Support Network: Relative's Home(Pt lives in a 1 story home with 4 THOMAS with the [facility])  Confirm Follow Up Transport: Self  Discharge Location  Discharge Placement: Home    MICKEY Lanza, LSW  Supervisee in [address] [place] [address]  [phone]

## 2020-10-25 NOTE — PROGRESS NOTES
Hospitalist Progress Note    NAME: Lizy Villeda   :  1960   MRN:  [id number]       Assessment / Plan:  Acute respiratory failure with hypoxia POA  Due to bilateral pneumonia due to COVID-19 infection POA  Sepsis POA due to above  Hypokalemia POA  Elevated LFTs POA-likely due to Covid infection related sepsis  Obstructive sleep apnea on CPAP at home  Fibromyalgia POA      Rapid Covid positive at urgent care 10/16/20, reports she took azithromycin for 5 days outpatient  -Improving oxygen requirement, currently on 3 L oxygen,  wean down keep oxygen saturation greater than 92%  -Improving LFTs  - ID and pulmonary on board  -Rapid Covid was ordered yesterday, so we can have a positive result in our chart/system    -Remdesivir started 10/24  -Dexamethasone 6 mg daily  -s/p 5 days of PO azithro as outpatient, give rocephin her  Albuterol HFA every 6 RT per Covid protocol  Mucinex twice daily    vitamin C, zinc PO   Robitussin-DM as needed cough    -Can give Tylenol as needed,    Continue home gabapentin  Continue home Cymbalta  Continue home CPAP               Code Status: Full code  Surrogate Decision Maker: Kin Lowe  Friend [phone]             DVT Prophylaxis: Subcu Lovenox  GI Prophylaxis: not indicated     Baseline: Patient is independent with ADLs at home     Subjective:     Chief Complaint / Reason for Physician Visit  Patient doing better, currently on 3 L nasal cannula. Complains of headache overnight    Review of Systems:  Symptom Y/N Comments  Symptom Y/N Comments   Fever/Chills y   Chest Pain n    Poor Appetite n   Edema n    Cough y   Abdominal Pain n    Sputum n   Joint Pain     SOB/ROY y   Pruritis/Rash     Nausea/vomit n   Tolerating PT/OT     Diarrhea    Tolerating Diet     Constipation    Other       Could NOT obtain due to:      Objective:     VITALS:   Last 24hrs VS reviewed since prior progress note.  Most recent are:  Patient Vitals for the past 24 hrs:   Temp Pulse Resp BP SpO2   10/25/20 0840 98.8 °F (37.1 °C) 88 16 (!) 128/58 93 %   10/25/20 0839 -- -- -- -- 93 %   10/25/20 0251 98.7 °F (37.1 °C) 82 18 125/61 92 %   10/25/20 0202 -- -- -- -- 94 %   10/24/20 2336 97.9 °F (36.6 °C) 88 18 (!) 126/59 94 %   10/24/20 2002 -- -- -- -- 93 %   10/24/20 1950 98.5 °F (36.9 °C) 78 16 136/62 93 %   10/24/20 1647 98.8 °F (37.1 °C) 76 16 (!) 125/59 93 %   10/24/20 1204 98.3 °F (36.8 °C) 80 20 126/60 93 %     No intake or output data in the 24 hours ending 10/25/20 1123     PHYSICAL EXAM:  General: WD, WN. Alert, cooperative, no acute distress    EENT:  EOMI. Anicteric sclerae. MMM  Resp:  CTA bilaterally, no wheezing or rales. No accessory muscle use  CV:  Regular  rhythm,  No edema  GI:  Soft, Non distended, Non tender.  +Bowel sounds  Neurologic:  Alert and oriented X 3, normal speech,   Psych:   Good insight. Not anxious nor agitated  Skin:  No rashes. No jaundice    Reviewed most current lab test results and cultures  YES  Reviewed most current radiology test results   YES  Review and summation of old records today    NO  Reviewed patient's current orders and MAR    YES  PMH/SH reviewed - no change compared to H&P  ________________________________________________________________________  Care Plan discussed with:    Comments   Patient x    Family      RN x    Care Manager     Consultant                        Multidiciplinary team rounds were held today with , nursing, pharmacist and clinical coordinator. Patient's plan of care was discussed; medications were reviewed and discharge planning was addressed.      ________________________________________________________________________  Total NON critical care TIME:  40   Minutes    Total CRITICAL CARE TIME Spent:   Minutes non procedure based      Comments   >50% of visit spent in counseling and coordination of care     ________________________________________________________________________  Sarah Sarasota Kya Claudio MD     Procedures: see electronic medical records for all procedures/Xrays and details which were not copied into this note but were reviewed prior to creation of Plan. LABS:  I reviewed today's most current labs and imaging studies.   Pertinent labs include:  Recent Labs     10/25/20  0520 10/24/20  0416 10/23/20  1426   WBC 5.2 3.3* 3.8   HGB 11.2* 12.0 12.2   HCT 35.0 36.9 35.9    141* 189     Recent Labs     10/25/20  0520 10/24/20  0416 10/23/20  1426    135* 138   K 3.0* 3.3* 3.2*    100 99   CO2 33* 29 31   GLU 94 116* 104*   BUN 14 13 15   CREA 0.64 0.59 0.72   CA 8.9 8.6 9.0   ALB 2.8* 3.0* 3.1*   TBILI 0.4 0.5 0.5   ALT 86* 123* 150*   INR 1.0 1.0 1.0       Signed: Yareli Leblanc MD

## 2020-10-25 NOTE — PROGRESS NOTES
hyun message   FROM [address] [facility] 1960   RM 0659   MESSAGE  patient is complaining of a headache. There are no as needed orders for pain medications. I was wondering if I can get an order for something for pain       NEEDS CALL BACK:  No callback requested       Called nurse back: N/A         Discussion / orders:  Patient has been admitted for Covid infection and has been started on remdesivir. Liver functions are elevated  AST 78 and therefore I want to be cautious with giving pain med that could aggravate this. Ordered one time dose of ibuprofen         NURSE  MESSAGE 2300  Patient is stating that the Motrin did not help her headache and is getting worse. Can you please get an order for something stronger? Discussion / orders:  Ordered one-time dose of Dilaudid IV 1 mg.

## 2020-10-25 NOTE — PROGRESS NOTES
Problem: Risk for Spread of Infection  Goal: Prevent transmission of infectious organism to others  Description: Prevent the transmission of infectious organisms to other patients, staff members, and visitors. Outcome: Progressing Towards Goal     Problem: Patient Education:  Go to Education Activity  Goal: Patient/Family Education  Outcome: Progressing Towards Goal     Problem: Breathing Pattern - Ineffective  Goal: *Absence of hypoxia  Outcome: Progressing Towards Goal     Problem: Falls - Risk of  Goal: *Absence of Falls  Description: Document Sin Lee Fall Risk and appropriate interventions in the flowsheet.   Outcome: Progressing Towards Goal  Note: Fall Risk Interventions:            Medication Interventions: Patient to call before getting OOB                   Problem: Patient Education: Go to Patient Education Activity  Goal: Patient/Family Education  Outcome: Progressing Towards Goal

## 2020-10-25 NOTE — PROGRESS NOTES
Bedside shift change report given to Tiago Arango RN (oncoming nurse) by TAMARA Dunaway (offgoing nurse). Report included the following information SBAR, Kardex, Intake/Output, MAR, Accordion and Recent Results.

## 2020-10-26 LAB
ALBUMIN SERPL-MCNC: 2.8 G/DL (ref 3.5–5)
ALBUMIN/GLOB SERPL: 0.7 {RATIO} (ref 1.1–2.2)
ALP SERPL-CCNC: 60 U/L (ref 45–117)
ALT SERPL-CCNC: 68 U/L (ref 12–78)
ANION GAP SERPL CALC-SCNC: 6 MMOL/L (ref 5–15)
APTT PPP: 25.8 SEC (ref 22.1–32)
AST SERPL-CCNC: 31 U/L (ref 15–37)
BASOPHILS # BLD: 0 K/UL (ref 0–0.1)
BASOPHILS NFR BLD: 0 % (ref 0–1)
BILIRUB SERPL-MCNC: 0.3 MG/DL (ref 0.2–1)
BUN SERPL-MCNC: 14 MG/DL (ref 6–20)
BUN/CREAT SERPL: 26 (ref 12–20)
CALCIUM SERPL-MCNC: 9.1 MG/DL (ref 8.5–10.1)
CHLORIDE SERPL-SCNC: 103 MMOL/L (ref 97–108)
CO2 SERPL-SCNC: 31 MMOL/L (ref 21–32)
COMMENT, HOLDF: NORMAL
CREAT SERPL-MCNC: 0.53 MG/DL (ref 0.55–1.02)
D DIMER PPP FEU-MCNC: 0.4 MG/L FEU (ref 0–0.65)
DIFFERENTIAL METHOD BLD: ABNORMAL
EOSINOPHIL # BLD: 0 K/UL (ref 0–0.4)
EOSINOPHIL NFR BLD: 0 % (ref 0–7)
ERYTHROCYTE [DISTWIDTH] IN BLOOD BY AUTOMATED COUNT: 13 % (ref 11.5–14.5)
FIBRINOGEN PPP-MCNC: 562 MG/DL (ref 200–475)
GLOBULIN SER CALC-MCNC: 4 G/DL (ref 2–4)
GLUCOSE SERPL-MCNC: 95 MG/DL (ref 65–100)
HCT VFR BLD AUTO: 35.9 % (ref 35–47)
HEALTH STATUS, XMCV2T: ABNORMAL
HGB BLD-MCNC: 11.4 G/DL (ref 11.5–16)
IMM GRANULOCYTES # BLD AUTO: 0 K/UL (ref 0–0.04)
IMM GRANULOCYTES NFR BLD AUTO: 0 % (ref 0–0.5)
INR PPP: 1 (ref 0.9–1.1)
LYMPHOCYTES # BLD: 1.1 K/UL (ref 0.8–3.5)
LYMPHOCYTES NFR BLD: 29 % (ref 12–49)
MCH RBC QN AUTO: 28.6 PG (ref 26–34)
MCHC RBC AUTO-ENTMCNC: 31.8 G/DL (ref 30–36.5)
MCV RBC AUTO: 90.2 FL (ref 80–99)
MONOCYTES # BLD: 0.5 K/UL (ref 0–1)
MONOCYTES NFR BLD: 12 % (ref 5–13)
NEUTS BAND NFR BLD MANUAL: 3 %
NEUTS SEG # BLD: 2.3 K/UL (ref 1.8–8)
NEUTS SEG NFR BLD: 56 % (ref 32–75)
NRBC # BLD: 0 K/UL (ref 0–0.01)
NRBC BLD-RTO: 0 PER 100 WBC
PLATELET # BLD AUTO: 233 K/UL (ref 150–400)
PMV BLD AUTO: 10.2 FL (ref 8.9–12.9)
POTASSIUM SERPL-SCNC: 2.8 MMOL/L (ref 3.5–5.1)
PROT SERPL-MCNC: 6.8 G/DL (ref 6.4–8.2)
PROTHROMBIN TIME: 10.7 SEC (ref 9–11.1)
RBC # BLD AUTO: 3.98 M/UL (ref 3.8–5.2)
RBC MORPH BLD: ABNORMAL
SAMPLES BEING HELD,HOLD: NORMAL
SARS-COV-2, COV2: DETECTED
SODIUM SERPL-SCNC: 140 MMOL/L (ref 136–145)
SOURCE, COVRS: ABNORMAL
SPECIMEN SOURCE, FCOV2M: ABNORMAL
SPECIMEN TYPE, XMCV1T: ABNORMAL
THERAPEUTIC RANGE,PTTT: NORMAL SECS (ref 58–77)
WBC # BLD AUTO: 3.9 K/UL (ref 3.6–11)

## 2020-10-26 PROCEDURE — 85384 FIBRINOGEN ACTIVITY: CPT

## 2020-10-26 PROCEDURE — 74011250636 HC RX REV CODE- 250/636: Performed by: INTERNAL MEDICINE

## 2020-10-26 PROCEDURE — 80053 COMPREHEN METABOLIC PANEL: CPT

## 2020-10-26 PROCEDURE — 85379 FIBRIN DEGRADATION QUANT: CPT

## 2020-10-26 PROCEDURE — 74011250637 HC RX REV CODE- 250/637: Performed by: INTERNAL MEDICINE

## 2020-10-26 PROCEDURE — 65660000000 HC RM CCU STEPDOWN

## 2020-10-26 PROCEDURE — 85610 PROTHROMBIN TIME: CPT

## 2020-10-26 PROCEDURE — 85730 THROMBOPLASTIN TIME PARTIAL: CPT

## 2020-10-26 PROCEDURE — 85025 COMPLETE CBC W/AUTO DIFF WBC: CPT

## 2020-10-26 PROCEDURE — 74011000250 HC RX REV CODE- 250: Performed by: STUDENT IN AN ORGANIZED HEALTH CARE EDUCATION/TRAINING PROGRAM

## 2020-10-26 PROCEDURE — 94760 N-INVAS EAR/PLS OXIMETRY 1: CPT

## 2020-10-26 PROCEDURE — 36415 COLL VENOUS BLD VENIPUNCTURE: CPT

## 2020-10-26 PROCEDURE — 74011000258 HC RX REV CODE- 258: Performed by: INTERNAL MEDICINE

## 2020-10-26 PROCEDURE — 77010033678 HC OXYGEN DAILY

## 2020-10-26 PROCEDURE — 94640 AIRWAY INHALATION TREATMENT: CPT

## 2020-10-26 PROCEDURE — 74011000258 HC RX REV CODE- 258: Performed by: STUDENT IN AN ORGANIZED HEALTH CARE EDUCATION/TRAINING PROGRAM

## 2020-10-26 RX ORDER — POTASSIUM CHLORIDE 750 MG/1
40 TABLET, FILM COATED, EXTENDED RELEASE ORAL 2 TIMES DAILY
Status: COMPLETED | OUTPATIENT
Start: 2020-10-26 | End: 2020-10-26

## 2020-10-26 RX ORDER — ALBUTEROL SULFATE 90 UG/1
2 AEROSOL, METERED RESPIRATORY (INHALATION)
Status: DISCONTINUED | OUTPATIENT
Start: 2020-10-26 | End: 2020-10-29 | Stop reason: HOSPADM

## 2020-10-26 RX ADMIN — GUAIFENESIN 600 MG: 600 TABLET, EXTENDED RELEASE ORAL at 20:23

## 2020-10-26 RX ADMIN — ZINC SULFATE 220 MG (50 MG) CAPSULE 1 CAPSULE: CAPSULE at 10:04

## 2020-10-26 RX ADMIN — OXYCODONE HYDROCHLORIDE AND ACETAMINOPHEN 1000 MG: 500 TABLET ORAL at 10:04

## 2020-10-26 RX ADMIN — POTASSIUM CHLORIDE 40 MEQ: 750 TABLET, FILM COATED, EXTENDED RELEASE ORAL at 10:04

## 2020-10-26 RX ADMIN — ALBUTEROL SULFATE 2 PUFF: 90 AEROSOL, METERED RESPIRATORY (INHALATION) at 02:13

## 2020-10-26 RX ADMIN — DULOXETINE HYDROCHLORIDE 60 MG: 30 CAPSULE, DELAYED RELEASE ORAL at 18:16

## 2020-10-26 RX ADMIN — REMDESIVIR 100 MG: 100 INJECTION, POWDER, LYOPHILIZED, FOR SOLUTION INTRAVENOUS at 16:44

## 2020-10-26 RX ADMIN — POTASSIUM CHLORIDE 40 MEQ: 750 TABLET, FILM COATED, EXTENDED RELEASE ORAL at 18:16

## 2020-10-26 RX ADMIN — DEXAMETHASONE 6 MG: 4 TABLET ORAL at 10:04

## 2020-10-26 RX ADMIN — DULOXETINE HYDROCHLORIDE 60 MG: 30 CAPSULE, DELAYED RELEASE ORAL at 10:04

## 2020-10-26 RX ADMIN — CEFTRIAXONE 1 G: 1 INJECTION, POWDER, FOR SOLUTION INTRAMUSCULAR; INTRAVENOUS at 20:22

## 2020-10-26 RX ADMIN — ACETAMINOPHEN 650 MG: 325 TABLET ORAL at 20:26

## 2020-10-26 RX ADMIN — ALBUTEROL SULFATE 2 PUFF: 90 AEROSOL, METERED RESPIRATORY (INHALATION) at 20:38

## 2020-10-26 RX ADMIN — Medication 10 ML: at 15:32

## 2020-10-26 RX ADMIN — ENOXAPARIN SODIUM 40 MG: 40 INJECTION SUBCUTANEOUS at 22:27

## 2020-10-26 RX ADMIN — GUAIFENESIN 600 MG: 600 TABLET, EXTENDED RELEASE ORAL at 10:04

## 2020-10-26 RX ADMIN — GUAIFENESIN AND DEXTROMETHORPHAN 5 ML: 100; 10 SYRUP ORAL at 15:31

## 2020-10-26 RX ADMIN — GABAPENTIN 300 MG: 300 CAPSULE ORAL at 22:27

## 2020-10-26 RX ADMIN — GABAPENTIN 300 MG: 300 CAPSULE ORAL at 15:31

## 2020-10-26 RX ADMIN — ALBUTEROL SULFATE 2 PUFF: 90 AEROSOL, METERED RESPIRATORY (INHALATION) at 09:17

## 2020-10-26 RX ADMIN — GABAPENTIN 300 MG: 300 CAPSULE ORAL at 10:04

## 2020-10-26 RX ADMIN — Medication 10 ML: at 22:31

## 2020-10-26 RX ADMIN — ENOXAPARIN SODIUM 40 MG: 40 INJECTION SUBCUTANEOUS at 10:04

## 2020-10-26 NOTE — PROGRESS NOTES
Physician Progress Note      Aditi Tipton  CSN #:                  [id number]  :                       1960  ADMIT DATE:       10/23/2020 5:36 PM  [address] [place] DATE:  RESPONDING  PROVIDER #:        Salbador Phelps MD          QUERY DENISLithompson Domínguez, HOSPITALIST TEAM:    This patient admitted with COVID Pneumonia. The medial record also notes a dx. of Sepsis, however, medical record documentation is lacking documentation of clinical indicators to support the diagnosis of sepsis and if possible, please add those clinical indicators to support the sepsis diagnosis, or document if the diagnosis of Sepsis has been ruled out:    Please indicate one of the following and document in the medical record: The medical record reflects the following:  Risk Factors: COVID Pneumonia, Acute hypoxic resp. failure, Sleep Apnea and on CPAP at home. Clinical Indicators: Afebrile, hR in the 70-80s---resp were elevated @ 25, x1 lab with WBC @ 3.3 on 10/24, Lactic acid 0.7,  Treatment: No Blood cultures have been assessed at time query placed. . Lactic acid was assessed  (resulted 0.7)--Remdesivir, Dexamethasone, (pt had been on po , IV Rocephin q 24 hours,  Options provided:  -- Sepsis present as evidenced by, Please document evidence. -- Sepsis was ruled out after study. The patient is being treated for Localized infection (COVID Pneumonia without Sepsis)  -- Other - I will add my own diagnosis  -- Disagree - Not applicable / Not valid  -- Disagree - Clinically unable to determine / Unknown  -- Refer to Clinical Documentation Reviewer    PROVIDER RESPONSE TEXT:    Sepsis is present as evidenced by WBC 3.3, RR 3.3. Sepsis From viral PNA.     Query created by: Leonardo Ortiz on 10/26/2020 3:21 PM      Electronically signed by:  Salbador Phelps MD 10/26/2020 7:38 PM

## 2020-10-26 NOTE — PROGRESS NOTES
Hospitalist Progress Note    NAME: Jossy Cleveland   :  1960   MRN:  [id number]       Assessment / Plan:  Acute respiratory failure with hypoxia POA  Due to bilateral pneumonia due to COVID-19 infection POA  Sepsis POA due to above  Hypokalemia POA   Elevated LFTs POA-likely due to Covid infection related sepsis  Obstructive sleep apnea on CPAP at home  Fibromyalgia POA      Rapid Covid positive at urgent care 10/16/20, reports she took azithromycin for 5 days outpatient  -Improving oxygen requirement, currently on 1 L nasal cannula,    -Improving LFTs  - ID and pulmonary on board    -Remdesivir started 10/24  -Dexamethasone 6 mg daily  -s/p 5 days of PO azithro as outpatient, give rocephin her  Albuterol HFA every 6 RT per Covid protocol  Mucinex twice daily    vitamin C, zinc PO   Robitussin-DM as needed cough    -Can give Tylenol as needed,    Continue home gabapentin  Continue home Cymbalta  Continue home CPAP               Code Status: Full code  Surrogate Decision Maker: Chandan Jarrell  Friend [phone]             DVT Prophylaxis: Subcu Lovenox  GI Prophylaxis: not indicated     Baseline: Patient is independent with ADLs at home    -Patient improving clinically, currently on 1 L nasal cannula. Pt will have Last dose of remdesivir on 10/28. Subjective:     Chief Complaint / Reason for Physician Visit  Patient doing better, currently on 2 L nasal cannula. Review of Systems:  Symptom Y/N Comments  Symptom Y/N Comments   Fever/Chills y   Chest Pain n    Poor Appetite n   Edema n    Cough y   Abdominal Pain n    Sputum n   Joint Pain     SOB/ROY y   Pruritis/Rash     Nausea/vomit n   Tolerating PT/OT     Diarrhea    Tolerating Diet     Constipation    Other       Could NOT obtain due to:      Objective:     VITALS:   Last 24hrs VS reviewed since prior progress note.  Most recent are:  Patient Vitals for the past 24 hrs:   Temp Pulse Resp BP SpO2   10/26/20 1134 98.1 °F (36.7 °C) 76 16 135/63 90 %   10/26/20 0918 -- -- -- -- 93 %   10/26/20 0828 97.8 °F (36.6 °C) 78 16 (!) 124/57 90 %   10/26/20 0305 98 °F (36.7 °C) 70 16 (!) 143/66 97 %   10/26/20 0213 -- -- -- -- 92 %   10/25/20 2359 98.3 °F (36.8 °C) 75 16 (!) 154/68 91 %   10/25/20 2121 -- -- -- -- 92 %   10/25/20 1957 98.5 °F (36.9 °C) 71 16 124/60 91 %   10/25/20 1708 97.9 °F (36.6 °C) 68 16 129/62 97 %   10/25/20 1316 -- -- -- -- 94 %     No intake or output data in the 24 hours ending 10/26/20 1253     PHYSICAL EXAM:  General: WD, WN. Alert, cooperative, no acute distress    EENT:  EOMI. Anicteric sclerae. MMM  Resp:  CTA bilaterally, no wheezing or rales. No accessory muscle use  CV:  Regular  rhythm,  No edema  GI:  Soft, Non distended, Non tender.  +Bowel sounds  Neurologic:  Alert and oriented X 3, normal speech,   Psych:   Good insight. Not anxious nor agitated  Skin:  No rashes. No jaundice    Reviewed most current lab test results and cultures  YES  Reviewed most current radiology test results   YES  Review and summation of old records today    NO  Reviewed patient's current orders and MAR    YES  PMH/ reviewed - no change compared to H&P  ________________________________________________________________________  Care Plan discussed with:    Comments   Patient x    Family      RN x    Care Manager     Consultant                        Multidiciplinary team rounds were held today with , nursing, pharmacist and clinical coordinator. Patient's plan of care was discussed; medications were reviewed and discharge planning was addressed.      ________________________________________________________________________  Total NON critical care TIME:  40   Minutes    Total CRITICAL CARE TIME Spent:   Minutes non procedure based      Comments   >50% of visit spent in counseling and coordination of care     ________________________________________________________________________  Tabitha Klein MD     Procedures: see electronic medical records for all procedures/Xrays and details which were not copied into this note but were reviewed prior to creation of Plan. LABS:  I reviewed today's most current labs and imaging studies.   Pertinent labs include:  Recent Labs     10/26/20  0645 10/25/20  0520 10/24/20  0416   WBC 3.9 5.2 3.3*   HGB 11.4* 11.2* 12.0   HCT 35.9 35.0 36.9    198 141*     Recent Labs     10/26/20  0645 10/25/20  0520 10/24/20  0416    139 135*   K 2.8* 3.0* 3.3*    101 100   CO2 31 33* 29   GLU 95 94 116*   BUN 14 14 13   CREA 0.53* 0.64 0.59   CA 9.1 8.9 8.6   ALB 2.8* 2.8* 3.0*   TBILI 0.3 0.4 0.5   ALT 68 86* 123*   INR 1.0 1.0 1.0       Signed: Brittni Jacobs MD

## 2020-10-26 NOTE — PROGRESS NOTES
[facility]  Pulmonary, Critical Care, and Sleep Medicine    Name: Hillary Redding MRN: [id number]   : 1960 Hospital: [facility]   Date: 10/26/2020        IMPRESSION:   · Acute hypoxic respiratory failure  · COVID-19 pneumonia  · JINA  · Obesity   · Elevated LFTs      RECOMMENDATIONS:   · O2 - wean; I have weaned to 1 LPM with SpO2 93%  · Dexamethasone  · Remdesivir  · Vitamin C/Zinc  · Monitor inflammatory markers  · DVT prophylaxis  · Hopefully home soon if she continues to improve     Subjective:     10-26-20: feeling better overall. No new complaints. Past Medical History:   Diagnosis Date    Fibromyalgia     Kidney stones     passed 67 stones    Other ill-defined conditions(799.89)     kidney stones      Past Surgical History:   Procedure Laterality Date    HX OTHER SURGICAL      lung biopsy      Prior to Admission medications    Medication Sig Start Date End Date Taking? Authorizing Provider   oxyCODONE-acetaminophen (PERCOCET) 5-325 mg per tablet Take 1 Tab by mouth every six (6) hours as needed for Pain. Max Daily Amount: 4 Tabs. 3/31/17   Phoebe Houston PA   gabapentin (NEURONTIN) 300 mg capsule Take 1 Cap by mouth three (3) times daily. 6/24/15   La Nena Parker MD   duloxetine (CYMBALTA) 60 mg capsule Take 60 mg by mouth two (2) times a day. Provider, [place]   hydrocortisone (HYTONE) 2.5 % topical cream Apply  to affected area two (2) times a day. use thin layer 10/28/14   La Nena Parker MD   hydrocodone-acetaminophen [facility]) 5-325 mg per tablet Take 1 tablet by mouth every six (6) hours as needed for Pain for up to 20 doses. 10/28/14   La Nena Parker MD     Allergies   Allergen Reactions    Sulfa (Sulfonamide Antibiotics) Other (comments)     dizziness      Social History     Tobacco Use    Smoking status: Never Smoker    Smokeless tobacco: Never Used   Substance Use Topics    Alcohol use: No      No family history on file. Current Facility-Administered Medications   Medication Dose Route Frequency    potassium chloride SR (KLOR-CON 10) tablet 40 mEq  40 mEq Oral BID    influenza vaccine 2020- (6 mos+)(PF) (FLUARIX/FLULAVAL/FLUZONE QUAD) injection 0.5 mL  0.5 mL IntraMUSCular PRIOR TO DISCHARGE    remdesivir 100 mg in 0.9% sodium chloride 250 mL IVPB  100 mg IntraVENous Q24H    enoxaparin (LOVENOX) injection 40 mg  40 mg SubCUTAneous Q12H    DULoxetine (CYMBALTA) capsule 60 mg  60 mg Oral BID    gabapentin (NEURONTIN) capsule 300 mg  300 mg Oral TID    sodium chloride (NS) flush 5-40 mL  5-40 mL IntraVENous Q8H    dexAMETHasone (DECADRON) tablet 6 mg  6 mg Oral DAILY    guaiFENesin ER (MUCINEX) tablet 600 mg  600 mg Oral Q12H    ascorbic acid (vitamin C) (VITAMIN C) tablet 1,000 mg  1,000 mg Oral DAILY    zinc sulfate (ZINCATE) 220 (50) mg capsule 1 Cap  1 Cap Oral DAILY    cefTRIAXone (ROCEPHIN) 1 g in 0.9% sodium chloride (MBP/ADV) 50 mL  1 g IntraVENous Q24H    albuterol (PROVENTIL HFA, VENTOLIN HFA, PROAIR HFA) inhaler 2 Puff  2 Puff Inhalation Q6H RT       Objective:   Vital Signs:    Visit Vitals  BP (!) 124/57   Pulse 78   Temp 97.8 °F (36.6 °C)   Resp 16   Ht 5' 8\" (1.727 m)   Wt 119.1 kg (262 lb 9.1 oz)   SpO2 93%   BMI 39.92 kg/m²       O2 Device: Nasal cannula   O2 Flow Rate (L/min): 2 l/min   Temp (24hrs), Av.1 °F (36.7 °C), Min:97.8 °F (36.6 °C), Max:98.5 °F (36.9 °C)       Intake/Output:   Last shift:      No intake/output data recorded. Last 3 shifts: No intake/output data recorded. No intake or output data in the 24 hours ending 10/26/20 1050   Physical Exam:   General:  Alert, cooperative, no distress, obese   Head:  Normocephalic, without obvious abnormality, atraumatic. Eyes:  Conjunctivae/corneas clear. Nose: Nares normal. Septum midline.  Mucosa normal.     Throat: Lips, mucosa, and tongue normal. Teeth and gums normal.   Neck: Supple, symmetrical, trachea midline    Back:   Symmetric, no curvature. ROM normal.   Lungs:   Clear to auscultation bilaterally. Chest wall:  No tenderness or deformity. Heart:  Regular rate and rhythm   Abdomen:   Soft, non-tender. Bowel sounds normal.     Extremities: Extremities normal, atraumatic, no cyanosis or clubbing. Skin: Skin color, texture, turgor normal. No rashes or lesions   Neurologic: Grossly nonfocal     Data:   Labs:  Recent Labs     10/26/20  0645 10/25/20  0520 10/24/20  0416   WBC 3.9 5.2 3.3*   HGB 11.4* 11.2* 12.0   HCT 35.9 35.0 36.9    198 141*     Recent Labs     10/26/20  0645 10/25/20  0520 10/24/20  0416    139 135*   K 2.8* 3.0* 3.3*    101 100   CO2 31 33* 29   GLU 95 94 116*   BUN 14 14 13   CREA 0.53* 0.64 0.59   CA 9.1 8.9 8.6   ALB 2.8* 2.8* 3.0*   TBILI 0.3 0.4 0.5   ALT 68 86* 123*   INR 1.0 1.0 1.0     No results for input(s): PHI, PCO2I, PO2I, HCO3I, FIO2I in the last 72 hours.     Imaging:  I have personally reviewed the patients radiographs:  None today        Jayjay Muniz MD

## 2020-10-26 NOTE — PROGRESS NOTES
Problem: Risk for Spread of Infection  Goal: Prevent transmission of infectious organism to others  Description: Prevent the transmission of infectious organisms to other patients, staff members, and visitors. Outcome: Progressing Towards Goal     Problem: Patient Education:  Go to Education Activity  Goal: Patient/Family Education  Outcome: Progressing Towards Goal     Problem: Breathing Pattern - Ineffective  Goal: *Absence of hypoxia  Outcome: Progressing Towards Goal     Problem: Falls - Risk of  Goal: *Absence of Falls  Description: Document Jess Robles Fall Risk and appropriate interventions in the flowsheet. Outcome: Progressing Towards Goal  Note: Fall Risk Interventions:            Medication Interventions: Patient to call before getting OOB                   Problem: Patient Education: Go to Patient Education Activity  Goal: Patient/Family Education  Outcome: Progressing Towards Goal     Problem: Airway Clearance - Ineffective  Goal: Achieve or maintain patent airway  Outcome: Progressing Towards Goal     Problem: Gas Exchange - Impaired  Goal: Absence of hypoxia  Outcome: Progressing Towards Goal  Goal: Promote optimal lung function  Outcome: Progressing Towards Goal     Problem: Breathing Pattern - Ineffective  Goal: Ability to achieve and maintain a regular respiratory rate  Outcome: Progressing Towards Goal     Problem:  Body Temperature -  Risk of, Imbalanced  Goal: Ability to maintain a body temperature within defined limits  Outcome: Progressing Towards Goal  Goal: Will regain or maintain usual level of consciousness  Outcome: Progressing Towards Goal  Goal: Complications related to the disease process, condition or treatment will be avoided or minimized  Outcome: Progressing Towards Goal     Problem: Isolation Precautions - Risk of Spread of Infection  Goal: Prevent transmission of infectious organism to others  Outcome: Progressing Towards Goal     Problem: Nutrition Deficits  Goal: Optimize nutrtional status  Outcome: Progressing Towards Goal     Problem: Risk for Fluid Volume Deficit  Goal: Maintain normal heart rhythm  Outcome: Progressing Towards Goal  Goal: Maintain absence of muscle cramping  Outcome: Progressing Towards Goal  Goal: Maintain normal serum potassium, sodium, calcium, phosphorus, and pH  Outcome: Progressing Towards Goal     Problem: Loneliness or Risk for Loneliness  Goal: Demonstrate positive use of time alone when socialization is not possible  Outcome: Progressing Towards Goal     Problem: Fatigue  Goal: Verbalize increase energy and improved vitality  Outcome: Progressing Towards Goal     Problem: Patient Education: Go to Patient Education Activity  Goal: Patient/Family Education  Outcome: Progressing Towards Goal     Problem: Patient Education: Go to Patient Education Activity  Goal: Patient/Family Education  Outcome: Progressing Towards Goal     Problem: Pneumonia: Day 1  Goal: Off Pathway (Use only if patient is Off Pathway)  Outcome: Progressing Towards Goal  Goal: Activity/Safety  Outcome: Progressing Towards Goal  Goal: Consults, if ordered  Outcome: Progressing Towards Goal  Goal: Diagnostic Test/Procedures  Outcome: Progressing Towards Goal  Goal: Nutrition/Diet  Outcome: Progressing Towards Goal  Goal: Medications  Outcome: Progressing Towards Goal  Goal: Respiratory  Outcome: Progressing Towards Goal  Goal: Treatments/Interventions/Procedures  Outcome: Progressing Towards Goal  Goal: Psychosocial  Outcome: Progressing Towards Goal  Goal: *Oxygen saturation within defined limits  Outcome: Progressing Towards Goal  Goal: *Influenza vaccine administered (October-March)  Outcome: Progressing Towards Goal  Goal: *Pneumoccocal vaccine administered  Outcome: Progressing Towards Goal  Goal: *Hemodynamically stable  Outcome: Progressing Towards Goal  Goal: *Demonstrates progressive activity  Outcome: Progressing Towards Goal  Goal: *Tolerating diet  Outcome: Progressing Towards Goal     Problem: Pneumonia: Day 2  Goal: Off Pathway (Use only if patient is Off Pathway)  Outcome: Progressing Towards Goal  Goal: Activity/Safety  Outcome: Progressing Towards Goal  Goal: Consults, if ordered  Outcome: Progressing Towards Goal  Goal: Diagnostic Test/Procedures  Outcome: Progressing Towards Goal  Goal: Nutrition/Diet  Outcome: Progressing Towards Goal  Goal: Discharge Planning  Outcome: Progressing Towards Goal  Goal: Medications  Outcome: Progressing Towards Goal  Goal: Respiratory  Outcome: Progressing Towards Goal  Goal: Treatments/Interventions/Procedures  Outcome: Progressing Towards Goal  Goal: Psychosocial  Outcome: Progressing Towards Goal  Goal: *Oxygen saturation within defined limits  Outcome: Progressing Towards Goal  Goal: *Hemodynamically stable  Outcome: Progressing Towards Goal  Goal: *Demonstrates progressive activity  Outcome: Progressing Towards Goal  Goal: *Tolerating diet  Outcome: Progressing Towards Goal  Goal: *Optimal pain control at patient's stated goal  Outcome: Progressing Towards Goal     Problem: Pneumonia: Day 3  Goal: Off Pathway (Use only if patient is Off Pathway)  Outcome: Progressing Towards Goal  Goal: Activity/Safety  Outcome: Progressing Towards Goal  Goal: Consults, if ordered  Outcome: Progressing Towards Goal  Goal: Diagnostic Test/Procedures  Outcome: Progressing Towards Goal  Goal: Nutrition/Diet  Outcome: Progressing Towards Goal  Goal: Discharge Planning  Outcome: Progressing Towards Goal  Goal: Medications  Outcome: Progressing Towards Goal  Goal: Respiratory  Outcome: Progressing Towards Goal  Goal: Treatments/Interventions/Procedures  Outcome: Progressing Towards Goal  Goal: Psychosocial  Outcome: Progressing Towards Goal  Goal: *Oxygen saturation within defined limits  Outcome: Progressing Towards Goal  Goal: *Hemodynamically stable  Outcome: Progressing Towards Goal  Goal: *Demonstrates progressive activity  Outcome: Progressing Towards Goal  Goal: *Tolerating diet  Outcome: Progressing Towards Goal  Goal: *Describes available resources and support systems  Outcome: Progressing Towards Goal  Goal: *Optimal pain control at patient's stated goal  Outcome: Progressing Towards Goal     Problem: Pneumonia: Day 4  Goal: Off Pathway (Use only if patient is Off Pathway)  Outcome: Progressing Towards Goal  Goal: Activity/Safety  Outcome: Progressing Towards Goal  Goal: Nutrition/Diet  Outcome: Progressing Towards Goal  Goal: Discharge Planning  Outcome: Progressing Towards Goal  Goal: Medications  Outcome: Progressing Towards Goal  Goal: Respiratory  Outcome: Progressing Towards Goal  Goal: Treatments/Interventions/Procedures  Outcome: Progressing Towards Goal  Goal: Psychosocial  Outcome: Progressing Towards Goal     Problem: Pneumonia: Discharge Outcomes  Goal: *Demonstrates progressive activity  Outcome: Progressing Towards Goal  Goal: *Describes follow-up/return visits to physicians  Outcome: Progressing Towards Goal  Goal: *Tolerating diet  Outcome: Progressing Towards Goal  Goal: *Verbalizes name, dosage, time, side effects, and number of days to continue medications  Outcome: Progressing Towards Goal  Goal: *Influenza immunization  Outcome: Progressing Towards Goal  Goal: *Pneumococcal immunization  Outcome: Progressing Towards Goal  Goal: *Respiratory status at baseline  Outcome: Progressing Towards Goal  Goal: *Vital signs within defined limits  Outcome: Progressing Towards Goal  Goal: *Describes available resources and support systems  Outcome: Progressing Towards Goal  Goal: *Optimal pain control at patient's stated goal  Outcome: Progressing Towards Goal

## 2020-10-26 NOTE — PROGRESS NOTES
Bedside shift change report given to Evelio Matos RN (oncoming nurse) by TAMARA Dunaway (offgoing nurse). Report included the following information SBAR, Kardex, Intake/Output, MAR, Accordion and Recent Results.

## 2020-10-26 NOTE — PROGRESS NOTES
Bedside and Verbal shift change report given to Emelyn [facility] (oncoming nurse) by Zack Vazquez RN (offgoing nurse). Report included the following information SBAR, Kardex, Intake/Output, MAR, Accordion and Med Rec Status.

## 2020-10-26 NOTE — PROGRESS NOTES
Problem: Risk for Spread of Infection  Goal: Prevent transmission of infectious organism to others  Description: Prevent the transmission of infectious organisms to other patients, staff members, and visitors. Outcome: Progressing Towards Goal     Problem: Patient Education:  Go to Education Activity  Goal: Patient/Family Education  Outcome: Progressing Towards Goal     Problem: Falls - Risk of  Goal: *Absence of Falls  Description: Document Nila Lowe Fall Risk and appropriate interventions in the flowsheet.   Outcome: Progressing Towards Goal  Note: Fall Risk Interventions:            Medication Interventions: Patient to call before getting OOB, Evaluate medications/consider consulting pharmacy, Teach patient to arise slowly                   Problem: Patient Education: Go to Patient Education Activity  Goal: Patient/Family Education  Outcome: Progressing Towards Goal

## 2020-10-27 LAB
ALBUMIN SERPL-MCNC: 2.9 G/DL (ref 3.5–5)
ALBUMIN/GLOB SERPL: 0.7 {RATIO} (ref 1.1–2.2)
ALP SERPL-CCNC: 62 U/L (ref 45–117)
ALT SERPL-CCNC: 66 U/L (ref 12–78)
ANION GAP SERPL CALC-SCNC: 7 MMOL/L (ref 5–15)
AST SERPL-CCNC: 29 U/L (ref 15–37)
BILIRUB SERPL-MCNC: 0.3 MG/DL (ref 0.2–1)
BUN SERPL-MCNC: 13 MG/DL (ref 6–20)
BUN/CREAT SERPL: 22 (ref 12–20)
CALCIUM SERPL-MCNC: 9.3 MG/DL (ref 8.5–10.1)
CHLORIDE SERPL-SCNC: 104 MMOL/L (ref 97–108)
CO2 SERPL-SCNC: 29 MMOL/L (ref 21–32)
CREAT SERPL-MCNC: 0.6 MG/DL (ref 0.55–1.02)
CRP SERPL-MCNC: 2.82 MG/DL (ref 0–0.6)
D DIMER PPP FEU-MCNC: 0.35 MG/L FEU (ref 0–0.65)
ERYTHROCYTE [DISTWIDTH] IN BLOOD BY AUTOMATED COUNT: 13.1 % (ref 11.5–14.5)
FERRITIN SERPL-MCNC: 73 NG/ML (ref 26–388)
GLOBULIN SER CALC-MCNC: 4.2 G/DL (ref 2–4)
GLUCOSE SERPL-MCNC: 92 MG/DL (ref 65–100)
HCT VFR BLD AUTO: 35.5 % (ref 35–47)
HGB BLD-MCNC: 11.5 G/DL (ref 11.5–16)
MCH RBC QN AUTO: 29.3 PG (ref 26–34)
MCHC RBC AUTO-ENTMCNC: 32.4 G/DL (ref 30–36.5)
MCV RBC AUTO: 90.6 FL (ref 80–99)
NRBC # BLD: 0 K/UL (ref 0–0.01)
NRBC BLD-RTO: 0 PER 100 WBC
PLATELET # BLD AUTO: 254 K/UL (ref 150–400)
PMV BLD AUTO: 10.3 FL (ref 8.9–12.9)
POTASSIUM SERPL-SCNC: 3.2 MMOL/L (ref 3.5–5.1)
PROT SERPL-MCNC: 7.1 G/DL (ref 6.4–8.2)
RBC # BLD AUTO: 3.92 M/UL (ref 3.8–5.2)
SODIUM SERPL-SCNC: 140 MMOL/L (ref 136–145)
WBC # BLD AUTO: 4.1 K/UL (ref 3.6–11)

## 2020-10-27 PROCEDURE — 74011250636 HC RX REV CODE- 250/636: Performed by: INTERNAL MEDICINE

## 2020-10-27 PROCEDURE — 74011000258 HC RX REV CODE- 258: Performed by: STUDENT IN AN ORGANIZED HEALTH CARE EDUCATION/TRAINING PROGRAM

## 2020-10-27 PROCEDURE — 74011250637 HC RX REV CODE- 250/637: Performed by: INTERNAL MEDICINE

## 2020-10-27 PROCEDURE — 65660000000 HC RM CCU STEPDOWN

## 2020-10-27 PROCEDURE — 94640 AIRWAY INHALATION TREATMENT: CPT

## 2020-10-27 PROCEDURE — 94760 N-INVAS EAR/PLS OXIMETRY 1: CPT

## 2020-10-27 PROCEDURE — 77010033678 HC OXYGEN DAILY

## 2020-10-27 PROCEDURE — 80053 COMPREHEN METABOLIC PANEL: CPT

## 2020-10-27 PROCEDURE — 86140 C-REACTIVE PROTEIN: CPT

## 2020-10-27 PROCEDURE — 85027 COMPLETE CBC AUTOMATED: CPT

## 2020-10-27 PROCEDURE — 74011000250 HC RX REV CODE- 250: Performed by: STUDENT IN AN ORGANIZED HEALTH CARE EDUCATION/TRAINING PROGRAM

## 2020-10-27 PROCEDURE — 82728 ASSAY OF FERRITIN: CPT

## 2020-10-27 PROCEDURE — 36415 COLL VENOUS BLD VENIPUNCTURE: CPT

## 2020-10-27 PROCEDURE — 74011000258 HC RX REV CODE- 258: Performed by: INTERNAL MEDICINE

## 2020-10-27 PROCEDURE — 85379 FIBRIN DEGRADATION QUANT: CPT

## 2020-10-27 RX ORDER — POTASSIUM CHLORIDE 750 MG/1
40 TABLET, FILM COATED, EXTENDED RELEASE ORAL
Status: COMPLETED | OUTPATIENT
Start: 2020-10-27 | End: 2020-10-27

## 2020-10-27 RX ORDER — CLONAZEPAM 0.5 MG/1
0.5 TABLET ORAL ONCE
Status: COMPLETED | OUTPATIENT
Start: 2020-10-27 | End: 2020-10-27

## 2020-10-27 RX ORDER — CLONAZEPAM 0.5 MG/1
0.5 TABLET ORAL
Status: DISCONTINUED | OUTPATIENT
Start: 2020-10-27 | End: 2020-10-29 | Stop reason: HOSPADM

## 2020-10-27 RX ADMIN — CLONAZEPAM 0.5 MG: 0.5 TABLET ORAL at 21:24

## 2020-10-27 RX ADMIN — ALBUTEROL SULFATE 2 PUFF: 90 AEROSOL, METERED RESPIRATORY (INHALATION) at 19:34

## 2020-10-27 RX ADMIN — DEXAMETHASONE 6 MG: 4 TABLET ORAL at 10:29

## 2020-10-27 RX ADMIN — GUAIFENESIN AND DEXTROMETHORPHAN 5 ML: 100; 10 SYRUP ORAL at 18:23

## 2020-10-27 RX ADMIN — ALBUTEROL SULFATE 2 PUFF: 90 AEROSOL, METERED RESPIRATORY (INHALATION) at 07:52

## 2020-10-27 RX ADMIN — CLONAZEPAM 0.5 MG: 0.5 TABLET ORAL at 13:36

## 2020-10-27 RX ADMIN — REMDESIVIR 100 MG: 100 INJECTION, POWDER, LYOPHILIZED, FOR SOLUTION INTRAVENOUS at 16:37

## 2020-10-27 RX ADMIN — CEFTRIAXONE 1 G: 1 INJECTION, POWDER, FOR SOLUTION INTRAMUSCULAR; INTRAVENOUS at 20:25

## 2020-10-27 RX ADMIN — Medication 10 ML: at 13:37

## 2020-10-27 RX ADMIN — GABAPENTIN 300 MG: 300 CAPSULE ORAL at 21:24

## 2020-10-27 RX ADMIN — ZINC SULFATE 220 MG (50 MG) CAPSULE 1 CAPSULE: CAPSULE at 10:29

## 2020-10-27 RX ADMIN — ENOXAPARIN SODIUM 40 MG: 40 INJECTION SUBCUTANEOUS at 10:29

## 2020-10-27 RX ADMIN — GUAIFENESIN 600 MG: 600 TABLET, EXTENDED RELEASE ORAL at 10:29

## 2020-10-27 RX ADMIN — GUAIFENESIN 600 MG: 600 TABLET, EXTENDED RELEASE ORAL at 21:23

## 2020-10-27 RX ADMIN — GABAPENTIN 300 MG: 300 CAPSULE ORAL at 16:37

## 2020-10-27 RX ADMIN — ENOXAPARIN SODIUM 40 MG: 40 INJECTION SUBCUTANEOUS at 21:24

## 2020-10-27 RX ADMIN — Medication 10 ML: at 21:24

## 2020-10-27 RX ADMIN — POTASSIUM CHLORIDE 40 MEQ: 750 TABLET, FILM COATED, EXTENDED RELEASE ORAL at 18:18

## 2020-10-27 RX ADMIN — DULOXETINE HYDROCHLORIDE 60 MG: 30 CAPSULE, DELAYED RELEASE ORAL at 10:30

## 2020-10-27 RX ADMIN — ALBUTEROL SULFATE 2 PUFF: 90 AEROSOL, METERED RESPIRATORY (INHALATION) at 13:59

## 2020-10-27 RX ADMIN — OXYCODONE HYDROCHLORIDE AND ACETAMINOPHEN 1000 MG: 500 TABLET ORAL at 10:29

## 2020-10-27 RX ADMIN — DULOXETINE HYDROCHLORIDE 60 MG: 30 CAPSULE, DELAYED RELEASE ORAL at 18:18

## 2020-10-27 RX ADMIN — GABAPENTIN 300 MG: 300 CAPSULE ORAL at 10:30

## 2020-10-27 NOTE — PROGRESS NOTES
[facility]  Pulmonary, Critical Care, and Sleep Medicine    Name: Otto Madera MRN: [id number]   : 1960 Hospital: [facility]   Date: 10/27/2020        IMPRESSION:   · Acute hypoxic respiratory failure  · COVID-19 pneumonia  · JINA  · Obesity   · Elevated LFTs      RECOMMENDATIONS:   · O2 - wean off if possible  · Dexamethasone x 10 days total  · Remdesivir  · Vitamin C/Zinc  · Monitor inflammatory markers  · DVT prophylaxis  · Hopefully home soon if she continues to improve     Subjective:     10-27-20: improving dyspnea. On 1 LPM, with plans to wean completely today. 10-26-20: feeling better overall. No new complaints. Past Medical History:   Diagnosis Date    Fibromyalgia     Kidney stones     passed 67 stones    Other ill-defined conditions(799.89)     kidney stones      Past Surgical History:   Procedure Laterality Date    HX OTHER SURGICAL      lung biopsy      Prior to Admission medications    Medication Sig Start Date End Date Taking? Authorizing Provider   oxyCODONE-acetaminophen (PERCOCET) 5-325 mg per tablet Take 1 Tab by mouth every six (6) hours as needed for Pain. Max Daily Amount: 4 Tabs. 3/31/17   Cheyenne Houston PA   gabapentin (NEURONTIN) 300 mg capsule Take 1 Cap by mouth three (3) times daily. 6/24/15   Maria Gutiérrez MD   duloxetine (CYMBALTA) 60 mg capsule Take 60 mg by mouth two (2) times a day. Provider, [place]   hydrocortisone (HYTONE) 2.5 % topical cream Apply  to affected area two (2) times a day. use thin layer 10/28/14   Maria Gutiérrez MD   hydrocodone-acetaminophen [facility]) 5-325 mg per tablet Take 1 tablet by mouth every six (6) hours as needed for Pain for up to 20 doses.  10/28/14   Maria Gutiérrez MD     Allergies   Allergen Reactions    Sulfa (Sulfonamide Antibiotics) Other (comments)     dizziness      Social History     Tobacco Use    Smoking status: Never Smoker    Smokeless tobacco: Never Used Substance Use Topics    Alcohol use: No      No family history on file. Current Facility-Administered Medications   Medication Dose Route Frequency    albuterol (PROVENTIL HFA, VENTOLIN HFA, PROAIR HFA) inhaler 2 Puff  2 Puff Inhalation TID RT    influenza vaccine 2020- (6 mos+)(PF) (FLUARIX/FLULAVAL/FLUZONE QUAD) injection 0.5 mL  0.5 mL IntraMUSCular PRIOR TO DISCHARGE    remdesivir 100 mg in 0.9% sodium chloride 250 mL IVPB  100 mg IntraVENous Q24H    enoxaparin (LOVENOX) injection 40 mg  40 mg SubCUTAneous Q12H    DULoxetine (CYMBALTA) capsule 60 mg  60 mg Oral BID    gabapentin (NEURONTIN) capsule 300 mg  300 mg Oral TID    sodium chloride (NS) flush 5-40 mL  5-40 mL IntraVENous Q8H    dexAMETHasone (DECADRON) tablet 6 mg  6 mg Oral DAILY    guaiFENesin ER (MUCINEX) tablet 600 mg  600 mg Oral Q12H    ascorbic acid (vitamin C) (VITAMIN C) tablet 1,000 mg  1,000 mg Oral DAILY    zinc sulfate (ZINCATE) 220 (50) mg capsule 1 Cap  1 Cap Oral DAILY    cefTRIAXone (ROCEPHIN) 1 g in 0.9% sodium chloride (MBP/ADV) 50 mL  1 g IntraVENous Q24H       Objective:   Vital Signs:    Visit Vitals  BP (!) 108/59   Pulse 70   Temp 97.8 °F (36.6 °C)   Resp 18   Ht 5' 8\" (1.727 m)   Wt 119.1 kg (262 lb 9.1 oz)   SpO2 94%   BMI 39.92 kg/m²       O2 Device: Nasal cannula   O2 Flow Rate (L/min): 2 l/min   Temp (24hrs), Av.1 °F (36.7 °C), Min:97.8 °F (36.6 °C), Max:98.5 °F (36.9 °C)       Intake/Output:   Last shift:      No intake/output data recorded. Last 3 shifts: No intake/output data recorded. No intake or output data in the 24 hours ending 10/27/20 1020   Physical Exam:   General:  Alert, cooperative, no distress, obese   Head:  Normocephalic, without obvious abnormality, atraumatic. Eyes:  Conjunctivae/corneas clear. Nose: Nares normal. Septum midline.  Mucosa normal.     Throat: Lips, mucosa, and tongue normal. Teeth and gums normal.   Neck: Supple, symmetrical, trachea midline    Back: Symmetric, no curvature. ROM normal.   Lungs:   Clear to auscultation bilaterally. Chest wall:  No tenderness or deformity. Heart:  Regular rate and rhythm   Abdomen:   Soft, non-tender. Bowel sounds normal.     Extremities: Extremities normal, atraumatic, no cyanosis or clubbing. Skin: Skin color, texture, turgor normal. No rashes or lesions   Neurologic: Grossly nonfocal     Data:   Labs:  Recent Labs     10/27/20  0606 10/26/20  0645 10/25/20  0520   WBC 4.1 3.9 5.2   HGB 11.5 11.4* 11.2*   HCT 35.5 35.9 35.0    233 198     Recent Labs     10/27/20  0606 10/26/20  0645 10/25/20  0520    140 139   K 3.2* 2.8* 3.0*    103 101   CO2 29 31 33*   GLU 92 95 94   BUN 13 14 14   CREA 0.60 0.53* 0.64   CA 9.3 9.1 8.9   ALB 2.9* 2.8* 2.8*   TBILI 0.3 0.3 0.4   ALT 66 68 86*   INR  --  1.0 1.0     No results for input(s): PHI, PCO2I, PO2I, HCO3I, FIO2I in the last 72 hours.     Imaging:  I have personally reviewed the patients radiographs:  None today        Mono Amaro MD

## 2020-10-27 NOTE — PROGRESS NOTES
"  Problem: General Rehab Plan of Care  Goal: Therapeutic Recreation/Music Therapy Goal  Description  The patient and/or their representative will achieve their patient-specific goals related to the plan of care.  The patient-specific goals include:    1. Patient will identify personal risk factors and signs/symptoms related to risk for violence.  2. Patient will identify a personal plan to report feelings (loss of control) and how to seek assistance from individuals who are prepared to intervene.  3. Patient will increase expression of feelings, needs and concerns through nonviolent channels.  4. Patient will practice assertive communication skills.  5. Patient will practice relaxation techniques (music, art and recreation)  6. Patient will utilize self-calming and protection techniques.  7. Patient will have an enhanced sense of safety; decreased feelings of vulnerability.  8. Patient will use [employer] of [employer] curriculum.      Attended 2 hours of music therapy group. Interventions focused on improving insight, socialization, and mood. Pt had high energy throughout groups and was easily distracted. With refocus, he participated in sary analysis and discussion about asking for help, and shared that \"I don't ask for help sometimes because I feel stupid.\" He actively participated in paddle drum game and in creating a list of coping skills with the group. Cooperative and pleasant.   Outcome: No Change     " Hospitalist Progress Note    NAME: Donavon Null   :  1960   MRN:  [id number]       Assessment / Plan:  Acute respiratory failure with hypoxia POA  Due to Bilateral pneumonia due to COVID-19 infection POA  Sepsis POA due to above  Continue Remdesivir  Continue Steroids  Continue inhalers  Vitamin C and Zinc  Appreciate Pulmonary and ID input  Tearful as friend also got sick    Hypokalemia POA   monitor and replete PRN    Elevated LFTs POA likely due to Covid infection related sepsis, improved  Obstructive sleep apnea on CPAP at home  Fibromyalgia POA  Continue home gabapentin  Continue home Cymbalta  Continue home CPAP    PRN Clonopin for stressfully situation with friend    Code Status: Full code  Surrogate Decision Maker: Osmel Kristianmily [phone], [phone]      DVT Prophylaxis: Subcu Lovenox  GI Prophylaxis: not indicated     Baseline: Patient is independent with ADLs at home    -Patient improving clinically, currently on 1 L nasal cannula. Pt will have Last dose of remdesivir on 10/28. Subjective: Pt seen and examined at bedside. Tearful. Breathing better. Overnight events d/w RN     Chief Complaint / Reason for Physician Visit; f/u: Richy Bonus PNA\"    Review of Systems:  Symptom Y/N Comments  Symptom Y/N Comments   Fever/Chills y   Chest Pain n    Poor Appetite n   Edema n    Cough y   Abdominal Pain n    Sputum n   Joint Pain     SOB/ROY y   Pruritis/Rash     Nausea/vomit n   Tolerating PT/OT     Diarrhea    Tolerating Diet     Constipation    Other       Could NOT obtain due to:      Objective:     VITALS:   Last 24hrs VS reviewed since prior progress note.  Most recent are:  Patient Vitals for the past 24 hrs:   Temp Pulse Resp BP SpO2   10/27/20 1457 98.1 °F (36.7 °C) 80 18 (!) 140/66 94 %   10/27/20 1403 -- -- -- -- 95 %   10/27/20 1116 97.6 °F (36.4 °C) 68 16 132/60 90 %   10/27/20 0800 97.9 °F (36.6 °C) 76 16 (!) 141/65 90 %   10/27/20 0751 -- -- -- -- 94 %   10/27/20 0300 97.8 °F (36.6 °C) 70 18 (!) 108/59 91 %   10/26/20 2039 -- -- -- -- 94 %   10/26/20 1900 98.1 °F (36.7 °C) 72 18 126/60 92 %     No intake or output data in the 24 hours ending 10/27/20 1710     PHYSICAL EXAM:  General: WD, WN. Alert, cooperative, no acute distress    EENT:  EOMI. Anicteric sclerae. MMM  Resp:  CTA bilaterally, no wheezing or rales. No accessory muscle use  CV:  Regular  rhythm,  No edema  GI:  Soft, Non distended, Non tender.  +Bowel sounds  Neurologic:  Alert and oriented X 3, normal speech,   Psych:   Good insight. Not anxious nor agitated  Skin:  No rashes. No jaundice    Reviewed most current lab test results and cultures  YES  Reviewed most current radiology test results   YES  Review and summation of old records today    NO  Reviewed patient's current orders and MAR    YES  PMH/SH reviewed - no change compared to H&P  ________________________________________________________________________  Care Plan discussed with:    Comments   Patient x    Family      RN x    Care Manager     Consultant                        Multidiciplinary team rounds were held today with , nursing, pharmacist and clinical coordinator. Patient's plan of care was discussed; medications were reviewed and discharge planning was addressed. ________________________________________________________________________  Total NON critical care TIME:  35 Minutes    Total CRITICAL CARE TIME Spent:   Minutes non procedure based      Comments   >50% of visit spent in counseling and coordination of care     ________________________________________________________________________  Roberta Pandey MD     Procedures: see electronic medical records for all procedures/Xrays and details which were not copied into this note but were reviewed prior to creation of Plan. LABS:  I reviewed today's most current labs and imaging studies.   Pertinent labs include:  Recent Labs     10/27/20  0606 10/26/20  0645 10/25/20  0842 WBC 4.1 3.9 5.2   HGB 11.5 11.4* 11.2*   HCT 35.5 35.9 35.0    233 198     Recent Labs     10/27/20  0606 10/26/20  0645 10/25/20  0520    140 139   K 3.2* 2.8* 3.0*    103 101   CO2 29 31 33*   GLU 92 95 94   BUN 13 14 14   CREA 0.60 0.53* 0.64   CA 9.3 9.1 8.9   ALB 2.9* 2.8* 2.8*   TBILI 0.3 0.3 0.4   ALT 66 68 86*   INR  --  1.0 1.0       Signed: Vimal Dawson MD

## 2020-10-27 NOTE — PROGRESS NOTES
Bedside shift change report given to Lefty Doshi, [facility] nurse) by TAMARA Dunaway (offgoing nurse).  Report included the following information SBAR, Kardex, Intake/Output, MAR, Accordion and Recent Results.  sanguineous

## 2020-10-27 NOTE — PROGRESS NOTES
Problem: Risk for Spread of Infection  Goal: Prevent transmission of infectious organism to others  Description: Prevent the transmission of infectious organisms to other patients, staff members, and visitors. Outcome: Progressing Towards Goal     Problem: Patient Education:  Go to Education Activity  Goal: Patient/Family Education  Outcome: Progressing Towards Goal     Problem: Breathing Pattern - Ineffective  Goal: *Absence of hypoxia  Outcome: Progressing Towards Goal     Problem: Falls - Risk of  Goal: *Absence of Falls  Description: Document Derick Mistry Fall Risk and appropriate interventions in the flowsheet. Outcome: Progressing Towards Goal  Note: Fall Risk Interventions:            Medication Interventions: Patient to call before getting OOB                   Problem: Patient Education: Go to Patient Education Activity  Goal: Patient/Family Education  Outcome: Progressing Towards Goal     Problem: Airway Clearance - Ineffective  Goal: Achieve or maintain patent airway  Outcome: Progressing Towards Goal     Problem: Gas Exchange - Impaired  Goal: Absence of hypoxia  Outcome: Progressing Towards Goal  Goal: Promote optimal lung function  Outcome: Progressing Towards Goal     Problem: Breathing Pattern - Ineffective  Goal: Ability to achieve and maintain a regular respiratory rate  Outcome: Progressing Towards Goal     Problem:  Body Temperature -  Risk of, Imbalanced  Goal: Ability to maintain a body temperature within defined limits  Outcome: Progressing Towards Goal  Goal: Will regain or maintain usual level of consciousness  Outcome: Progressing Towards Goal  Goal: Complications related to the disease process, condition or treatment will be avoided or minimized  Outcome: Progressing Towards Goal     Problem: Isolation Precautions - Risk of Spread of Infection  Goal: Prevent transmission of infectious organism to others  Outcome: Progressing Towards Goal     Problem: Nutrition Deficits  Goal: Optimize nutrtional status  Outcome: Progressing Towards Goal     Problem: Risk for Fluid Volume Deficit  Goal: Maintain normal heart rhythm  Outcome: Progressing Towards Goal  Goal: Maintain absence of muscle cramping  Outcome: Progressing Towards Goal  Goal: Maintain normal serum potassium, sodium, calcium, phosphorus, and pH  Outcome: Progressing Towards Goal     Problem: Loneliness or Risk for Loneliness  Goal: Demonstrate positive use of time alone when socialization is not possible  Outcome: Progressing Towards Goal     Problem: Fatigue  Goal: Verbalize increase energy and improved vitality  Outcome: Progressing Towards Goal     Problem: Patient Education: Go to Patient Education Activity  Goal: Patient/Family Education  Outcome: Progressing Towards Goal     Problem: Patient Education: Go to Patient Education Activity  Goal: Patient/Family Education  Outcome: Progressing Towards Goal     Problem: Pneumonia: Day 1  Goal: Off Pathway (Use only if patient is Off Pathway)  Outcome: Progressing Towards Goal  Goal: Activity/Safety  Outcome: Progressing Towards Goal  Goal: Consults, if ordered  Outcome: Progressing Towards Goal  Goal: Diagnostic Test/Procedures  Outcome: Progressing Towards Goal  Goal: Nutrition/Diet  Outcome: Progressing Towards Goal  Goal: Medications  Outcome: Progressing Towards Goal  Goal: Respiratory  Outcome: Progressing Towards Goal  Goal: Treatments/Interventions/Procedures  Outcome: Progressing Towards Goal  Goal: Psychosocial  Outcome: Progressing Towards Goal  Goal: *Oxygen saturation within defined limits  Outcome: Progressing Towards Goal  Goal: *Influenza vaccine administered (October-March)  Outcome: Progressing Towards Goal  Goal: *Pneumoccocal vaccine administered  Outcome: Progressing Towards Goal  Goal: *Hemodynamically stable  Outcome: Progressing Towards Goal  Goal: *Demonstrates progressive activity  Outcome: Progressing Towards Goal  Goal: *Tolerating diet  Outcome: Progressing Towards Goal     Problem: Pneumonia: Day 2  Goal: Off Pathway (Use only if patient is Off Pathway)  Outcome: Progressing Towards Goal  Goal: Activity/Safety  Outcome: Progressing Towards Goal  Goal: Consults, if ordered  Outcome: Progressing Towards Goal  Goal: Diagnostic Test/Procedures  Outcome: Progressing Towards Goal  Goal: Nutrition/Diet  Outcome: Progressing Towards Goal  Goal: Discharge Planning  Outcome: Progressing Towards Goal  Goal: Medications  Outcome: Progressing Towards Goal  Goal: Respiratory  Outcome: Progressing Towards Goal  Goal: Treatments/Interventions/Procedures  Outcome: Progressing Towards Goal  Goal: Psychosocial  Outcome: Progressing Towards Goal  Goal: *Oxygen saturation within defined limits  Outcome: Progressing Towards Goal  Goal: *Hemodynamically stable  Outcome: Progressing Towards Goal  Goal: *Demonstrates progressive activity  Outcome: Progressing Towards Goal  Goal: *Tolerating diet  Outcome: Progressing Towards Goal  Goal: *Optimal pain control at patient's stated goal  Outcome: Progressing Towards Goal     Problem: Pneumonia: Day 3  Goal: Off Pathway (Use only if patient is Off Pathway)  Outcome: Progressing Towards Goal  Goal: Activity/Safety  Outcome: Progressing Towards Goal  Goal: Consults, if ordered  Outcome: Progressing Towards Goal  Goal: Diagnostic Test/Procedures  Outcome: Progressing Towards Goal  Goal: Nutrition/Diet  Outcome: Progressing Towards Goal  Goal: Discharge Planning  Outcome: Progressing Towards Goal  Goal: Medications  Outcome: Progressing Towards Goal  Goal: Respiratory  Outcome: Progressing Towards Goal  Goal: Treatments/Interventions/Procedures  Outcome: Progressing Towards Goal  Goal: Psychosocial  Outcome: Progressing Towards Goal  Goal: *Oxygen saturation within defined limits  Outcome: Progressing Towards Goal  Goal: *Hemodynamically stable  Outcome: Progressing Towards Goal  Goal: *Demonstrates progressive activity  Outcome: Progressing Towards Goal  Goal: *Tolerating diet  Outcome: Progressing Towards Goal  Goal: *Describes available resources and support systems  Outcome: Progressing Towards Goal  Goal: *Optimal pain control at patient's stated goal  Outcome: Progressing Towards Goal     Problem: Pneumonia: Day 4  Goal: Off Pathway (Use only if patient is Off Pathway)  Outcome: Progressing Towards Goal  Goal: Activity/Safety  Outcome: Progressing Towards Goal  Goal: Nutrition/Diet  Outcome: Progressing Towards Goal  Goal: Discharge Planning  Outcome: Progressing Towards Goal  Goal: Medications  Outcome: Progressing Towards Goal  Goal: Respiratory  Outcome: Progressing Towards Goal  Goal: Treatments/Interventions/Procedures  Outcome: Progressing Towards Goal  Goal: Psychosocial  Outcome: Progressing Towards Goal     Problem: Pneumonia: Discharge Outcomes  Goal: *Demonstrates progressive activity  Outcome: Progressing Towards Goal  Goal: *Describes follow-up/return visits to physicians  Outcome: Progressing Towards Goal  Goal: *Tolerating diet  Outcome: Progressing Towards Goal  Goal: *Verbalizes name, dosage, time, side effects, and number of days to continue medications  Outcome: Progressing Towards Goal  Goal: *Influenza immunization  Outcome: Progressing Towards Goal  Goal: *Pneumococcal immunization  Outcome: Progressing Towards Goal  Goal: *Respiratory status at baseline  Outcome: Progressing Towards Goal  Goal: *Vital signs within defined limits  Outcome: Progressing Towards Goal  Goal: *Describes available resources and support systems  Outcome: Progressing Towards Goal  Goal: *Optimal pain control at patient's stated goal  Outcome: Progressing Towards Goal

## 2020-10-28 LAB
ALBUMIN SERPL-MCNC: 2.9 G/DL (ref 3.5–5)
ALBUMIN/GLOB SERPL: 0.8 {RATIO} (ref 1.1–2.2)
ALP SERPL-CCNC: 66 U/L (ref 45–117)
ALT SERPL-CCNC: 80 U/L (ref 12–78)
ANION GAP SERPL CALC-SCNC: 5 MMOL/L (ref 5–15)
AST SERPL-CCNC: 45 U/L (ref 15–37)
BILIRUB SERPL-MCNC: 0.5 MG/DL (ref 0.2–1)
BUN SERPL-MCNC: 14 MG/DL (ref 6–20)
BUN/CREAT SERPL: 25 (ref 12–20)
CALCIUM SERPL-MCNC: 9 MG/DL (ref 8.5–10.1)
CHLORIDE SERPL-SCNC: 107 MMOL/L (ref 97–108)
CO2 SERPL-SCNC: 28 MMOL/L (ref 21–32)
CREAT SERPL-MCNC: 0.56 MG/DL (ref 0.55–1.02)
CRP SERPL-MCNC: 1.61 MG/DL (ref 0–0.6)
D DIMER PPP FEU-MCNC: 0.34 MG/L FEU (ref 0–0.65)
ERYTHROCYTE [DISTWIDTH] IN BLOOD BY AUTOMATED COUNT: 13.2 % (ref 11.5–14.5)
FERRITIN SERPL-MCNC: 64 NG/ML (ref 26–388)
GLOBULIN SER CALC-MCNC: 3.7 G/DL (ref 2–4)
GLUCOSE SERPL-MCNC: 87 MG/DL (ref 65–100)
HCT VFR BLD AUTO: 37.4 % (ref 35–47)
HGB BLD-MCNC: 12 G/DL (ref 11.5–16)
MCH RBC QN AUTO: 29.3 PG (ref 26–34)
MCHC RBC AUTO-ENTMCNC: 32.1 G/DL (ref 30–36.5)
MCV RBC AUTO: 91.2 FL (ref 80–99)
NRBC # BLD: 0.02 K/UL (ref 0–0.01)
NRBC BLD-RTO: 0.4 PER 100 WBC
PLATELET # BLD AUTO: 241 K/UL (ref 150–400)
PMV BLD AUTO: 11 FL (ref 8.9–12.9)
POTASSIUM SERPL-SCNC: 4.3 MMOL/L (ref 3.5–5.1)
PROT SERPL-MCNC: 6.6 G/DL (ref 6.4–8.2)
RBC # BLD AUTO: 4.1 M/UL (ref 3.8–5.2)
SODIUM SERPL-SCNC: 140 MMOL/L (ref 136–145)
WBC # BLD AUTO: 4.8 K/UL (ref 3.6–11)

## 2020-10-28 PROCEDURE — 74011000250 HC RX REV CODE- 250: Performed by: STUDENT IN AN ORGANIZED HEALTH CARE EDUCATION/TRAINING PROGRAM

## 2020-10-28 PROCEDURE — 86140 C-REACTIVE PROTEIN: CPT

## 2020-10-28 PROCEDURE — 80053 COMPREHEN METABOLIC PANEL: CPT

## 2020-10-28 PROCEDURE — 74011000258 HC RX REV CODE- 258: Performed by: STUDENT IN AN ORGANIZED HEALTH CARE EDUCATION/TRAINING PROGRAM

## 2020-10-28 PROCEDURE — 36415 COLL VENOUS BLD VENIPUNCTURE: CPT

## 2020-10-28 PROCEDURE — 74011250636 HC RX REV CODE- 250/636: Performed by: INTERNAL MEDICINE

## 2020-10-28 PROCEDURE — 74011000258 HC RX REV CODE- 258: Performed by: INTERNAL MEDICINE

## 2020-10-28 PROCEDURE — 74011250637 HC RX REV CODE- 250/637: Performed by: INTERNAL MEDICINE

## 2020-10-28 PROCEDURE — 85027 COMPLETE CBC AUTOMATED: CPT

## 2020-10-28 PROCEDURE — 77010033678 HC OXYGEN DAILY

## 2020-10-28 PROCEDURE — 82728 ASSAY OF FERRITIN: CPT

## 2020-10-28 PROCEDURE — 65660000000 HC RM CCU STEPDOWN

## 2020-10-28 PROCEDURE — 94760 N-INVAS EAR/PLS OXIMETRY 1: CPT

## 2020-10-28 PROCEDURE — 77030027138 HC INCENT SPIROMETER -A

## 2020-10-28 PROCEDURE — 94640 AIRWAY INHALATION TREATMENT: CPT

## 2020-10-28 PROCEDURE — 85379 FIBRIN DEGRADATION QUANT: CPT

## 2020-10-28 RX ADMIN — ALBUTEROL SULFATE 2 PUFF: 90 AEROSOL, METERED RESPIRATORY (INHALATION) at 14:12

## 2020-10-28 RX ADMIN — ALBUTEROL SULFATE 2 PUFF: 90 AEROSOL, METERED RESPIRATORY (INHALATION) at 19:41

## 2020-10-28 RX ADMIN — DULOXETINE HYDROCHLORIDE 60 MG: 30 CAPSULE, DELAYED RELEASE ORAL at 17:33

## 2020-10-28 RX ADMIN — Medication 10 ML: at 14:16

## 2020-10-28 RX ADMIN — ENOXAPARIN SODIUM 40 MG: 40 INJECTION SUBCUTANEOUS at 09:39

## 2020-10-28 RX ADMIN — DEXAMETHASONE 6 MG: 4 TABLET ORAL at 10:19

## 2020-10-28 RX ADMIN — GABAPENTIN 300 MG: 300 CAPSULE ORAL at 09:40

## 2020-10-28 RX ADMIN — GABAPENTIN 300 MG: 300 CAPSULE ORAL at 21:05

## 2020-10-28 RX ADMIN — GUAIFENESIN 600 MG: 600 TABLET, EXTENDED RELEASE ORAL at 21:05

## 2020-10-28 RX ADMIN — CEFTRIAXONE 1 G: 1 INJECTION, POWDER, FOR SOLUTION INTRAMUSCULAR; INTRAVENOUS at 17:33

## 2020-10-28 RX ADMIN — CLONAZEPAM 0.5 MG: 0.5 TABLET ORAL at 21:05

## 2020-10-28 RX ADMIN — GUAIFENESIN 600 MG: 600 TABLET, EXTENDED RELEASE ORAL at 09:40

## 2020-10-28 RX ADMIN — GABAPENTIN 300 MG: 300 CAPSULE ORAL at 17:33

## 2020-10-28 RX ADMIN — OXYCODONE HYDROCHLORIDE AND ACETAMINOPHEN 1000 MG: 500 TABLET ORAL at 09:39

## 2020-10-28 RX ADMIN — Medication 10 ML: at 06:00

## 2020-10-28 RX ADMIN — DULOXETINE HYDROCHLORIDE 60 MG: 30 CAPSULE, DELAYED RELEASE ORAL at 09:39

## 2020-10-28 RX ADMIN — CLONAZEPAM 0.5 MG: 0.5 TABLET ORAL at 09:55

## 2020-10-28 RX ADMIN — ENOXAPARIN SODIUM 40 MG: 40 INJECTION SUBCUTANEOUS at 21:05

## 2020-10-28 RX ADMIN — ALBUTEROL SULFATE 2 PUFF: 90 AEROSOL, METERED RESPIRATORY (INHALATION) at 08:43

## 2020-10-28 RX ADMIN — Medication 10 ML: at 21:05

## 2020-10-28 RX ADMIN — ZINC SULFATE 220 MG (50 MG) CAPSULE 1 CAPSULE: CAPSULE at 09:40

## 2020-10-28 RX ADMIN — REMDESIVIR 100 MG: 100 INJECTION, POWDER, LYOPHILIZED, FOR SOLUTION INTRAVENOUS at 15:46

## 2020-10-28 NOTE — PROGRESS NOTES
Bedside and Verbal shift change report given to TAMARA Winchester (oncoming nurse) by Chanda Burciaga RN (offgoing nurse). Report included the following information SBAR, Kardex, Intake/Output, MAR, Accordion and Med Rec Status.

## 2020-10-28 NOTE — PROGRESS NOTES
Hospitalist Progress Note    NAME: Davis Alba   :  1960   MRN:  [id number]       Assessment / Plan:  Acute respiratory failure with hypoxia POA  Due to Bilateral pneumonia due to COVID-19 infection POA  Sepsis POA due to above  Continue Remdesivir  Continue Steroids  Continue inhalers  Vitamin C and Zinc  Appreciate Pulmonary and ID input  Wean off O2 as able to   Add Incentive Spirometry    Hypokalemia POA   monitor and replete PRN    Elevated LFTs POA likely due to Covid infection related sepsis, improved  Obstructive sleep apnea on CPAP at home  Fibromyalgia POA  Continue home gabapentin  Continue home Cymbalta  Continue home CPAP    PRN Clonopin for stressfully situation with friend    Code Status: Full code  Surrogate Decision Maker: Alexei Phillip [phone], [phone]      DVT Prophylaxis: Subcu Lovenox  GI Prophylaxis: not indicated     Baseline: Patient is independent with ADLs at home    -Patient improving clinically, currently on 1 L nasal cannula. Pt will have Last dose of remdesivir on 10/28. Subjective: Pt seen and examined at bedside. Tearful. Breathing better. Overnight events d/w RN     Chief Complaint / Reason for Physician Visit; f/u: Kathee Standing PNA\"    Review of Systems:  Symptom Y/N Comments  Symptom Y/N Comments   Fever/Chills y   Chest Pain n    Poor Appetite n   Edema n    Cough y   Abdominal Pain n    Sputum n   Joint Pain     SOB/ROY y   Pruritis/Rash     Nausea/vomit n   Tolerating PT/OT     Diarrhea    Tolerating Diet     Constipation    Other       Could NOT obtain due to:      Objective:     VITALS:   Last 24hrs VS reviewed since prior progress note.  Most recent are:  Patient Vitals for the past 24 hrs:   Temp Pulse Resp BP SpO2   10/28/20 1517 97.9 °F (36.6 °C) 77 18 (!) 123/58 91 %   10/28/20 1411 -- -- -- -- 96 %   10/28/20 1100 97.6 °F (36.4 °C) 75 18 (!) 146/78 96 %   10/28/20 0956 -- -- -- -- 93 %   10/28/20 0843 -- -- -- -- 97 %   10/28/20 0804 97.8 °F (36.6 °C) 76 18 130/65 94 %   10/28/20 0327 97.7 °F (36.5 °C) 75 18 124/64 92 %   10/27/20 2359 97.9 °F (36.6 °C) 66 18 131/65 93 %   10/27/20 1934 -- -- -- -- 94 %   10/27/20 1900 98 °F (36.7 °C) 73 18 (!) 141/75 94 %     No intake or output data in the 24 hours ending 10/28/20 1655     PHYSICAL EXAM:  General: WD, WN. Alert, cooperative, no acute distress    EENT:  EOMI. Anicteric sclerae. MMM  Resp:  CTA bilaterally, no wheezing or rales. No accessory muscle use  CV:  Regular  rhythm,  No edema  GI:  Soft, Non distended, Non tender.  +Bowel sounds  Neurologic:  Alert and oriented X 3, normal speech,   Psych:   Good insight. Not anxious nor agitated  Skin:  No rashes. No jaundice    Reviewed most current lab test results and cultures  YES  Reviewed most current radiology test results   YES  Review and summation of old records today    NO  Reviewed patient's current orders and MAR    YES  PMH/ reviewed - no change compared to H&P  ________________________________________________________________________  Care Plan discussed with:    Comments   Patient x    Family      RN x    Care Manager     Consultant                        Multidiciplinary team rounds were held today with , nursing, pharmacist and clinical coordinator. Patient's plan of care was discussed; medications were reviewed and discharge planning was addressed. ________________________________________________________________________  Total NON critical care TIME:  25 Minutes    Total CRITICAL CARE TIME Spent:   Minutes non procedure based      Comments   >50% of visit spent in counseling and coordination of care     ________________________________________________________________________  Kellie Heard MD     Procedures: see electronic medical records for all procedures/Xrays and details which were not copied into this note but were reviewed prior to creation of Plan.       LABS:  I reviewed today's most current labs and imaging studies.   Pertinent labs include:  Recent Labs     10/28/20  0453 10/27/20  0606 10/26/20  0645   WBC 4.8 4.1 3.9   HGB 12.0 11.5 11.4*   HCT 37.4 35.5 35.9    254 233     Recent Labs     10/28/20  0453 10/27/20  0606 10/26/20  0645    140 140   K 4.3 3.2* 2.8*    104 103   CO2 28 29 31   GLU 87 92 95   BUN 14 13 14   CREA 0.56 0.60 0.53*   CA 9.0 9.3 9.1   ALB 2.9* 2.9* 2.8*   TBILI 0.5 0.3 0.3   ALT 80* 66 68   INR  --   --  1.0       Signed: Manuel Moreno MD

## 2020-10-28 NOTE — PROGRESS NOTES
Orthopedic End of Shift Note    Bedside shift change report given to Myke Rice RN (oncoming nurse) by Juan Diego Smith RN (offgoing nurse). Report included the following information SBAR, Kardex, ED Summary, Intake/Output, MAR, Recent Results and Cardiac Rhythm NSR.      POD# none  Significant issues during shift: weaning off O2    Issues for Physician to address: weaning off O2    Activity This Shift  (check all that apply) [] chair  [] dangle   [x] bathroom  [] bedside commode [] hallway  [] bedrest   Nausea/Vomiting [] yes [x] no     Voiding Status [x] void [] Luna [] I&O Cath   Bowel Movements [x] yes [] no     Foot Pumps or SCD [] yes [x] no    Ice Pack [] yes    [x] no    Incentive Spirometer [x] yes [] no 1600   Telemetry Monitoring   [x] yes [] no Rhythm:NSR   Supplemental O2 [x] yes [] no Sat O2:  97%

## 2020-10-28 NOTE — PROGRESS NOTES
Attempting to ween patient from oxygen down to 1 liter O2 at 93%. At room air O2 was below target at 89%.

## 2020-10-28 NOTE — PROGRESS NOTES
[facility]  Pulmonary, Critical Care, and Sleep Medicine    Name: Renetta Reis MRN: [id number]   : 1960 Hospital: [facility]   Date: 10/28/2020        IMPRESSION:   · Acute hypoxic respiratory failure  · COVID-19 pneumonia  · JINA  · Obesity   · Elevated LFTs      RECOMMENDATIONS:   · O2 - wean as tolerated. Unfortunately she was 89% on room air on my check but good on 1 LPM.  Continue attempts at weaning. · Dexamethasone x 10 days total  · Remdesivir  · Vitamin C/Zinc  · Monitor inflammatory markers  · DVT prophylaxis; will need Eliquis 5 mg bid x 30 days at discharge  · Hopefully home soon if she continues to improve     Subjective:     10-28-20: no events. No new complaints. 10-27-20: improving dyspnea. On 1 LPM, with plans to wean completely today. 10-26-20: feeling better overall. No new complaints. Past Medical History:   Diagnosis Date    Fibromyalgia     Kidney stones     passed 67 stones    Other ill-defined conditions(559.80)     kidney stones      Past Surgical History:   Procedure Laterality Date    HX OTHER SURGICAL      lung biopsy      Prior to Admission medications    Medication Sig Start Date End Date Taking? Authorizing Provider   oxyCODONE-acetaminophen (PERCOCET) 5-325 mg per tablet Take 1 Tab by mouth every six (6) hours as needed for Pain. Max Daily Amount: 4 Tabs. 3/31/17   Prem Houston PA   gabapentin (NEURONTIN) 300 mg capsule Take 1 Cap by mouth three (3) times daily. 6/24/15   Severo Haynes MD   duloxetine (CYMBALTA) 60 mg capsule Take 60 mg by mouth two (2) times a day. Provider, [place]   hydrocortisone (HYTONE) 2.5 % topical cream Apply  to affected area two (2) times a day. use thin layer 10/28/14   Severo Haynes MD   hydrocodone-acetaminophen [facility]) 5-325 mg per tablet Take 1 tablet by mouth every six (6) hours as needed for Pain for up to 20 doses.  10/28/14   Severo Haynes MD     Allergies Allergen Reactions    Sulfa (Sulfonamide Antibiotics) Other (comments)     dizziness      Social History     Tobacco Use    Smoking status: Never Smoker    Smokeless tobacco: Never Used   Substance Use Topics    Alcohol use: No      No family history on file. Current Facility-Administered Medications   Medication Dose Route Frequency    albuterol (PROVENTIL HFA, VENTOLIN HFA, PROAIR HFA) inhaler 2 Puff  2 Puff Inhalation TID RT    influenza vaccine 2020-21 (6 mos+)(PF) (FLUARIX/FLULAVAL/FLUZONE QUAD) injection 0.5 mL  0.5 mL IntraMUSCular PRIOR TO DISCHARGE    remdesivir 100 mg in 0.9% sodium chloride 250 mL IVPB  100 mg IntraVENous Q24H    enoxaparin (LOVENOX) injection 40 mg  40 mg SubCUTAneous Q12H    DULoxetine (CYMBALTA) capsule 60 mg  60 mg Oral BID    gabapentin (NEURONTIN) capsule 300 mg  300 mg Oral TID    sodium chloride (NS) flush 5-40 mL  5-40 mL IntraVENous Q8H    dexAMETHasone (DECADRON) tablet 6 mg  6 mg Oral DAILY    guaiFENesin ER (MUCINEX) tablet 600 mg  600 mg Oral Q12H    ascorbic acid (vitamin C) (VITAMIN C) tablet 1,000 mg  1,000 mg Oral DAILY    zinc sulfate (ZINCATE) 220 (50) mg capsule 1 Cap  1 Cap Oral DAILY       Objective:   Vital Signs:    Visit Vitals  /65 (BP 1 Location: Left arm, BP Patient Position: At rest)   Pulse 76   Temp 97.8 °F (36.6 °C)   Resp 18   Ht 5' 8\" (1.727 m)   Wt 119.1 kg (262 lb 9.1 oz)   SpO2 97%   BMI 39.92 kg/m²       O2 Device: Nasal cannula   O2 Flow Rate (L/min): 2 l/min   Temp (24hrs), Av.9 °F (36.6 °C), Min:97.6 °F (36.4 °C), Max:98.1 °F (36.7 °C)       Intake/Output:   Last shift:      No intake/output data recorded. Last 3 shifts: No intake/output data recorded. No intake or output data in the 24 hours ending 10/28/20 0941   Physical Exam:   General:  Alert, cooperative, no distress, obese   Head:  Normocephalic, without obvious abnormality, atraumatic. Eyes:  Conjunctivae/corneas clear.      Nose: Nares normal. Septum midline. Mucosa normal.     Throat: Lips, mucosa, and tongue normal. Teeth and gums normal.   Neck: Supple, symmetrical, trachea midline    Back:   Symmetric, no curvature. ROM normal.   Lungs:   Clear to auscultation bilaterally. Chest wall:  No tenderness or deformity. Heart:  Regular rate and rhythm   Abdomen:   Soft, non-tender. Bowel sounds normal.     Extremities: Extremities normal, atraumatic, no cyanosis or clubbing. Skin: Skin color, texture, turgor normal. No rashes or lesions   Neurologic: Grossly nonfocal     Data:   Labs:  Recent Labs     10/28/20  0453 10/27/20  0606 10/26/20  0645   WBC 4.8 4.1 3.9   HGB 12.0 11.5 11.4*   HCT 37.4 35.5 35.9    254 233     Recent Labs     10/28/20  0453 10/27/20  0606 10/26/20  0645    140 140   K 4.3 3.2* 2.8*    104 103   CO2 28 29 31   GLU 87 92 95   BUN 14 13 14   CREA 0.56 0.60 0.53*   CA 9.0 9.3 9.1   ALB 2.9* 2.9* 2.8*   TBILI 0.5 0.3 0.3   ALT 80* 66 68   INR  --   --  1.0     No results for input(s): PHI, PCO2I, PO2I, HCO3I, FIO2I in the last 72 hours.     Imaging:  I have personally reviewed the patients radiographs:  None today        Deepthi Judge MD Regular rate & rhythm, normal S1, S2; no murmurs, gallops or rubs; no S3, S4

## 2020-10-29 VITALS
WEIGHT: 262.57 LBS | HEART RATE: 90 BPM | OXYGEN SATURATION: 95 % | HEIGHT: 68 IN | BODY MASS INDEX: 39.79 KG/M2 | SYSTOLIC BLOOD PRESSURE: 131 MMHG | DIASTOLIC BLOOD PRESSURE: 63 MMHG | TEMPERATURE: 98.4 F | RESPIRATION RATE: 18 BRPM

## 2020-10-29 PROBLEM — F41.9 ANXIETY: Status: ACTIVE | Noted: 2020-10-29

## 2020-10-29 LAB
ALBUMIN SERPL-MCNC: 3 G/DL (ref 3.5–5)
ALBUMIN/GLOB SERPL: 0.7 {RATIO} (ref 1.1–2.2)
ALP SERPL-CCNC: 66 U/L (ref 45–117)
ALT SERPL-CCNC: 77 U/L (ref 12–78)
ANION GAP SERPL CALC-SCNC: 5 MMOL/L (ref 5–15)
AST SERPL-CCNC: 35 U/L (ref 15–37)
BILIRUB SERPL-MCNC: 0.2 MG/DL (ref 0.2–1)
BUN SERPL-MCNC: 15 MG/DL (ref 6–20)
BUN/CREAT SERPL: 27 (ref 12–20)
CALCIUM SERPL-MCNC: 9.5 MG/DL (ref 8.5–10.1)
CHLORIDE SERPL-SCNC: 103 MMOL/L (ref 97–108)
CO2 SERPL-SCNC: 30 MMOL/L (ref 21–32)
CREAT SERPL-MCNC: 0.55 MG/DL (ref 0.55–1.02)
CRP SERPL-MCNC: 1.13 MG/DL (ref 0–0.6)
D DIMER PPP FEU-MCNC: 0.34 MG/L FEU (ref 0–0.65)
ERYTHROCYTE [DISTWIDTH] IN BLOOD BY AUTOMATED COUNT: 12.8 % (ref 11.5–14.5)
FERRITIN SERPL-MCNC: 57 NG/ML (ref 26–388)
GLOBULIN SER CALC-MCNC: 4.2 G/DL (ref 2–4)
GLUCOSE SERPL-MCNC: 102 MG/DL (ref 65–100)
HCT VFR BLD AUTO: 37 % (ref 35–47)
HGB BLD-MCNC: 12 G/DL (ref 11.5–16)
MCH RBC QN AUTO: 29.4 PG (ref 26–34)
MCHC RBC AUTO-ENTMCNC: 32.4 G/DL (ref 30–36.5)
MCV RBC AUTO: 90.7 FL (ref 80–99)
NRBC # BLD: 0 K/UL (ref 0–0.01)
NRBC BLD-RTO: 0 PER 100 WBC
PLATELET # BLD AUTO: 321 K/UL (ref 150–400)
PMV BLD AUTO: 10.1 FL (ref 8.9–12.9)
POTASSIUM SERPL-SCNC: 3.6 MMOL/L (ref 3.5–5.1)
PROT SERPL-MCNC: 7.2 G/DL (ref 6.4–8.2)
RBC # BLD AUTO: 4.08 M/UL (ref 3.8–5.2)
SODIUM SERPL-SCNC: 138 MMOL/L (ref 136–145)
WBC # BLD AUTO: 5.9 K/UL (ref 3.6–11)

## 2020-10-29 PROCEDURE — 74011250636 HC RX REV CODE- 250/636: Performed by: INTERNAL MEDICINE

## 2020-10-29 PROCEDURE — 77010033678 HC OXYGEN DAILY

## 2020-10-29 PROCEDURE — 94640 AIRWAY INHALATION TREATMENT: CPT

## 2020-10-29 PROCEDURE — 74011250637 HC RX REV CODE- 250/637: Performed by: INTERNAL MEDICINE

## 2020-10-29 PROCEDURE — 82728 ASSAY OF FERRITIN: CPT

## 2020-10-29 PROCEDURE — 90686 IIV4 VACC NO PRSV 0.5 ML IM: CPT | Performed by: INTERNAL MEDICINE

## 2020-10-29 PROCEDURE — 90471 IMMUNIZATION ADMIN: CPT

## 2020-10-29 PROCEDURE — 94760 N-INVAS EAR/PLS OXIMETRY 1: CPT

## 2020-10-29 PROCEDURE — 80053 COMPREHEN METABOLIC PANEL: CPT

## 2020-10-29 PROCEDURE — 85379 FIBRIN DEGRADATION QUANT: CPT

## 2020-10-29 PROCEDURE — 86140 C-REACTIVE PROTEIN: CPT

## 2020-10-29 PROCEDURE — 36415 COLL VENOUS BLD VENIPUNCTURE: CPT

## 2020-10-29 PROCEDURE — 85027 COMPLETE CBC AUTOMATED: CPT

## 2020-10-29 RX ORDER — GUAIFENESIN 600 MG/1
600 TABLET, EXTENDED RELEASE ORAL EVERY 12 HOURS
Qty: 14 TAB | Refills: 0 | Status: SHIPPED | OUTPATIENT
Start: 2020-10-29 | End: 2020-11-05

## 2020-10-29 RX ORDER — VITAMIN E 1000 UNIT
1000 CAPSULE ORAL DAILY
Qty: 14 TAB | Refills: 0 | Status: SHIPPED | OUTPATIENT
Start: 2020-10-30

## 2020-10-29 RX ORDER — ZINC SULFATE 50(220)MG
220 CAPSULE ORAL DAILY
Qty: 14 CAP | Refills: 0 | Status: SHIPPED | OUTPATIENT
Start: 2020-10-30 | End: 2020-11-13

## 2020-10-29 RX ORDER — BENZONATATE 200 MG/1
100 CAPSULE ORAL
Qty: 21 CAP | Refills: 0 | Status: SHIPPED | OUTPATIENT
Start: 2020-10-29 | End: 2020-11-05

## 2020-10-29 RX ORDER — DEXAMETHASONE 2 MG/1
TABLET ORAL
Qty: 19 TAB | Refills: 0 | Status: SHIPPED | OUTPATIENT
Start: 2020-10-30 | End: 2020-11-12

## 2020-10-29 RX ORDER — ALBUTEROL SULFATE 90 UG/1
2 AEROSOL, METERED RESPIRATORY (INHALATION)
Qty: 1 INHALER | Refills: 0 | Status: SHIPPED | OUTPATIENT
Start: 2020-10-29 | End: 2021-12-09 | Stop reason: ALTCHOICE

## 2020-10-29 RX ORDER — CLONAZEPAM 0.5 MG/1
0.5 TABLET ORAL
Qty: 10 TAB | Refills: 0 | Status: SHIPPED | OUTPATIENT
Start: 2020-10-29 | End: 2020-11-03 | Stop reason: SDUPTHER

## 2020-10-29 RX ADMIN — ALBUTEROL SULFATE: 90 AEROSOL, METERED RESPIRATORY (INHALATION) at 14:05

## 2020-10-29 RX ADMIN — GUAIFENESIN 600 MG: 600 TABLET, EXTENDED RELEASE ORAL at 09:17

## 2020-10-29 RX ADMIN — ZINC SULFATE 220 MG (50 MG) CAPSULE 1 CAPSULE: CAPSULE at 09:17

## 2020-10-29 RX ADMIN — ENOXAPARIN SODIUM 40 MG: 40 INJECTION SUBCUTANEOUS at 09:16

## 2020-10-29 RX ADMIN — CLONAZEPAM 0.5 MG: 0.5 TABLET ORAL at 09:16

## 2020-10-29 RX ADMIN — INFLUENZA VIRUS VACCINE 0.5 ML: 15; 15; 15; 15 SUSPENSION INTRAMUSCULAR at 13:35

## 2020-10-29 RX ADMIN — GABAPENTIN 300 MG: 300 CAPSULE ORAL at 09:17

## 2020-10-29 RX ADMIN — DEXAMETHASONE 6 MG: 4 TABLET ORAL at 09:17

## 2020-10-29 RX ADMIN — ALBUTEROL SULFATE: 90 AEROSOL, METERED RESPIRATORY (INHALATION) at 08:21

## 2020-10-29 RX ADMIN — OXYCODONE HYDROCHLORIDE AND ACETAMINOPHEN 1000 MG: 500 TABLET ORAL at 09:17

## 2020-10-29 RX ADMIN — DULOXETINE HYDROCHLORIDE 60 MG: 30 CAPSULE, DELAYED RELEASE ORAL at 09:17

## 2020-10-29 RX ADMIN — Medication 10 ML: at 05:23

## 2020-10-29 NOTE — DISCHARGE SUMMARY
Hospitalist Discharge Summary     Patient ID:  Soto Abad  [id number]  42 y.o.  1960  10/23/2020    PCP on record: Lizy Neville MD    Admit date: 10/23/2020  Discharge date and time: 10/29/2020    DISCHARGE DIAGNOSIS:  Acute respiratory failure with hypoxia   Bilateral pneumonia   COVID-19 infection  Sepsis   Hypokalemia   Elevated LFTs   Obstructive sleep apnea on CPAP at home  Fibromyalgia     CONSULTATIONS:  IP CONSULT TO INFECTIOUS DISEASES  IP CONSULT TO PULMONOLOGY    Excerpted HPI from H&P of Eduard Contreras MD:  Janeth Beckett is a 63 y.o.  female who presents with above complaints from home ambulatory. Patient presented with chief complaint of high fevers with cough and shortness of breath for past few days  History of associated worsening generalized weakness with poor appetite and nausea  History of being tested positive for Covid past weekend after she attended a conference ([employer]) and multiple people got Covid/sick  Patient history of obstructive sleep apnea uses CPAP  Patient also has history of fibromyalgia for which she takes Neurontin and Cymbalta.     Patient was found to be hypoxic into the 80 % on RA in ED with fever of 101.7 and chest x-ray showing classic COVID-19 pneumonia.     We were asked to admit for work up and evaluation of the above problems. ______________________________________________________________________  DISCHARGE SUMMARY/HOSPITAL COURSE:  for full details see H&P, daily progress notes, labs, consult notes.      Acute respiratory failure with hypoxia POA  Due to Bilateral pneumonia due to COVID-19 infection POA  Sepsis POA due to above  Completed Remdesivir  Taper Steroids after completing 10 days  Continue inhalers  Vitamin C and Zinc  Appreciate Pulmonary and ID input  Eliquis for 30 days as per pulmonary recommendations  Now off O2  Incentive Spirometry     Hypokalemia POA   Resolved with repletion     Elevated LFTs POA likely due to Covid infection related sepsis, improved  Obstructive sleep apnea on CPAP at home  Fibromyalgia POA  Continue home gabapentin  Continue home Cymbalta  Continue home CPAP     PRN Clonopin for stressfully situation with friend   _______________________________________________________________________  Patient seen and examined by me on discharge day. Pertinent Findings:  Gen:    Not in distress  Chest: Clear lungs  CVS:   Regular rhythm. No edema  Abd:  Soft, not distended, not tender  Neuro:  Alert, non focal  _____________________  Current Discharge Medication List      START taking these medications    Details   clonazePAM (KlonoPIN) 0.5 mg tablet Take 1 Tab by mouth two (2) times daily as needed for Anxiety. Max Daily Amount: 1 mg. Qty: 10 Tab, Refills: 0    Associated Diagnoses: Anxiety      guaiFENesin ER (MUCINEX) 600 mg ER tablet Take 1 Tab by mouth every twelve (12) hours for 7 days. Qty: 14 Tab, Refills: 0      benzonatate (TESSALON) 200 mg capsule Take 1 Cap by mouth three (3) times daily as needed for Cough for up to 7 days. Qty: 21 Cap, Refills: 0      zinc sulfate (ZINCATE) 220 (50) mg capsule Take 1 Cap by mouth daily for 14 days. Qty: 14 Cap, Refills: 0      dexAMETHasone (DECADRON) 2 mg tablet 3 tabs daily for 4 days   2 tabs daily for 2 days   1 tab   daily for 2 days  0.5 Tab daily for 2 days  Qty: 19 Tab, Refills: 0      ascorbic acid, vitamin C, (VITAMIN C) 1,000 mg tablet Take 1 Tab by mouth daily. Qty: 14 Tab, Refills: 0      albuterol (PROVENTIL HFA, VENTOLIN HFA, PROAIR HFA) 90 mcg/actuation inhaler Take 2 Puffs by inhalation every four (4) hours as needed for Wheezing or Shortness of Breath. Qty: 1 Inhaler, Refills: 0      apixaban (Eliquis) 5 mg tablet Take 1 Tab by mouth two (2) times a day for 30 days.   Qty: 60 Tab, Refills: 0         CONTINUE these medications which have NOT CHANGED    Details   gabapentin (NEURONTIN) 300 mg capsule Take 1 Cap by mouth three (3) times daily.  Qty: 90 Cap, Refills: 0      duloxetine (CYMBALTA) 60 mg capsule Take 60 mg by mouth two (2) times a day.            Follow-up Information     Follow up With Specialties Details Why Contact Info    Primary care physician  Schedule an appointment as soon as possible for a visit in 1 week you can ask for virtual visit from PCP     Jamin, MD Lizy    Patient can only remember the practice name and not the physician        ________________________________________________________________    Risk of deterioration: Moderate    Condition at Discharge:  Stable  __________________________________________________________________    Disposition  Home with family, no needs    ____________________________________________________________________    Code Status: Full Code  ___________________________________________________________________      Total time in minutes spent coordinating this discharge (includes going over instructions, follow-up, prescriptions, and preparing report for sign off to her PCP) :  35 minutes    Signed:  Beverly Pickens MD

## 2020-10-29 NOTE — PROGRESS NOTES
Problem: Risk for Spread of Infection  Goal: Prevent transmission of infectious organism to others  Description: Prevent the transmission of infectious organisms to other patients, staff members, and visitors. Outcome: Progressing Towards Goal     Problem: Breathing Pattern - Ineffective  Goal: *Absence of hypoxia  Outcome: Progressing Towards Goal     Problem: Falls - Risk of  Goal: *Absence of Falls  Description: Document Charo Fall Risk and appropriate interventions in the flowsheet.   Outcome: Progressing Towards Goal  Note: Fall Risk Interventions:            Medication Interventions: Patient to call before getting OOB

## 2020-10-29 NOTE — PROGRESS NOTES
[facility]  Pulmonary, Critical Care, and Sleep Medicine    Name: Lucila Garcia MRN: [id number]   : 1960 Hospital: [facility]   Date: 10/29/2020        IMPRESSION:   · Acute hypoxic respiratory failure  · COVID-19 pneumonia  · JINA  · Obesity   · Elevated LFTs      RECOMMENDATIONS:   · On room air  · Dexamethasone x 10 days total  · Remdesivir completed  · Vitamin C/Zinc  · DVT prophylaxis; will need Eliquis 5 mg bid x 30 days at discharge  · [id number] for discharge from my perspective     Subjective:     10-29-20: weaned off of O2. Feeling better. Feels ready to go home  10-28-20: no events. No new complaints. 10-27-20: improving dyspnea. On 1 LPM, with plans to wean completely today. 10-26-20: feeling better overall. No new complaints. Past Medical History:   Diagnosis Date    Fibromyalgia     Kidney stones     passed 67 stones    Other ill-defined conditions(959.89)     kidney stones      Past Surgical History:   Procedure Laterality Date    HX OTHER SURGICAL      lung biopsy      Prior to Admission medications    Medication Sig Start Date End Date Taking? Authorizing Provider   oxyCODONE-acetaminophen (PERCOCET) 5-325 mg per tablet Take 1 Tab by mouth every six (6) hours as needed for Pain. Max Daily Amount: 4 Tabs. 3/31/17   Thai Houston PA   gabapentin (NEURONTIN) 300 mg capsule Take 1 Cap by mouth three (3) times daily. 6/24/15   Girish Hall MD   duloxetine (CYMBALTA) 60 mg capsule Take 60 mg by mouth two (2) times a day. Provider, Historical   hydrocortisone (HYTONE) 2.5 % topical cream Apply  to affected area two (2) times a day. use thin layer 10/28/14   Girish Hall MD   hydrocodone-acetaminophen [facility]) 5-325 mg per tablet Take 1 tablet by mouth every six (6) hours as needed for Pain for up to 20 doses.  10/28/14   Girish Hall MD     Allergies   Allergen Reactions    Sulfa (Sulfonamide Antibiotics) Other (comments) dizziness      Social History     Tobacco Use    Smoking status: Never Smoker    Smokeless tobacco: Never Used   Substance Use Topics    Alcohol use: No      No family history on file. Current Facility-Administered Medications   Medication Dose Route Frequency    cefTRIAXone (ROCEPHIN) 1 g in 0.9% sodium chloride (MBP/ADV) 50 mL  1 g IntraVENous Q24H    albuterol (PROVENTIL HFA, VENTOLIN HFA, PROAIR HFA) inhaler 2 Puff  2 Puff Inhalation TID RT    influenza vaccine - (6 mos+)(PF) (FLUARIX/FLULAVAL/FLUZONE QUAD) injection 0.5 mL  0.5 mL IntraMUSCular PRIOR TO DISCHARGE    enoxaparin (LOVENOX) injection 40 mg  40 mg SubCUTAneous Q12H    DULoxetine (CYMBALTA) capsule 60 mg  60 mg Oral BID    gabapentin (NEURONTIN) capsule 300 mg  300 mg Oral TID    sodium chloride (NS) flush 5-40 mL  5-40 mL IntraVENous Q8H    dexAMETHasone (DECADRON) tablet 6 mg  6 mg Oral DAILY    guaiFENesin ER (MUCINEX) tablet 600 mg  600 mg Oral Q12H    ascorbic acid (vitamin C) (VITAMIN C) tablet 1,000 mg  1,000 mg Oral DAILY    zinc sulfate (ZINCATE) 220 (50) mg capsule 1 Cap  1 Cap Oral DAILY       Objective:   Vital Signs:    Visit Vitals  BP (!) 142/69 (BP 1 Location: Left arm, BP Patient Position: At rest)   Pulse 86   Temp 97.7 °F (36.5 °C)   Resp 18   Ht 5' 8\" (1.727 m)   Wt 119.1 kg (262 lb 9.1 oz)   SpO2 94%   BMI 39.92 kg/m²       O2 Device: Room air   O2 Flow Rate (L/min): 2 l/min   Temp (24hrs), Av.8 °F (36.6 °C), Min:97.6 °F (36.4 °C), Max:98 °F (36.7 °C)       Intake/Output:   Last shift:      No intake/output data recorded. Last 3 shifts: No intake/output data recorded. No intake or output data in the 24 hours ending 10/29/20 0857   Physical Exam:   General:  Alert, cooperative, no distress, obese   Head:  Normocephalic, without obvious abnormality, atraumatic. Eyes:  Conjunctivae/corneas clear. Nose: Nares normal. Septum midline.  Mucosa normal.     Throat: Lips, mucosa, and tongue normal. Teeth and gums normal.   Neck: Supple, symmetrical, trachea midline    Back:   Symmetric, no curvature. ROM normal.   Lungs:   Clear to auscultation bilaterally. Chest wall:  No tenderness or deformity. Heart:  Regular rate and rhythm   Abdomen:   Soft, non-tender. Bowel sounds normal.     Extremities: Extremities normal, atraumatic, no cyanosis or clubbing. Skin: Skin color, texture, turgor normal. No rashes or lesions   Neurologic: Grossly nonfocal     Data:   Labs:  Recent Labs     10/29/20  0025 10/28/20  0453 10/27/20  0606   WBC 5.9 4.8 4.1   HGB 12.0 12.0 11.5   HCT 37.0 37.4 35.5    241 254     Recent Labs     10/29/20  0025 10/28/20  0453 10/27/20  0606    140 140   K 3.6 4.3 3.2*    107 104   CO2 30 28 29   * 87 92   BUN 15 14 13   CREA 0.55 0.56 0.60   CA 9.5 9.0 9.3   ALB 3.0* 2.9* 2.9*   TBILI 0.2 0.5 0.3   ALT 77 80* 66     No results for input(s): PHI, PCO2I, PO2I, HCO3I, FIO2I in the last 72 hours.     Imaging:  I have personally reviewed the patients radiographs:  None today        Deepthi Judge MD

## 2020-10-29 NOTE — PROGRESS NOTES
ADULT PROTOCOL: JET AEROSOL  REASSESSMENT    Patient  Tammy Palma     63 y.o.   female     10/29/2020  10:54 AM    Breath Sounds Pre Procedure: Right Breath Sounds: Expiratory wheezing                               Left Breath Sounds: Expiratory wheezing    Breath Sounds Post Procedure: Right Breath Sounds: Expiratory wheezing                                 Left Breath Sounds: Expiratory wheezing    Breathing pattern: Pre procedure Breathing Pattern: Regular          Post procedure Breathing Pattern: Regular    Heart Rate: Pre procedure Pulse: 63           Post procedure Pulse: 63    Resp Rate: Pre procedure Respirations: 20           Post procedure Respirations: 20    Incentive Spirometry:  Actual Volume (ml): 1450 ml    Cough: Pre procedure Cough: Non-productive               Post procedure Cough: Non-productive    Sputum: Pre procedure Sputum color/odor: Tan  Sputum consistency:  Thick  Sputum method obtained: Spontaneous                 Post procedure      Oxygen: O2 Device: Nasal cannula        Changed: no    SpO2: Pre procedure SpO2: 94 %                 Post procedure SpO2: 94 %      Nebulizer Therapy: Current medications Aerosolized Medications: Albuterol      Changed:no}    Problem List:   Patient Active Problem List   Diagnosis Code    Renal stones N20.0    Fibromyalgia muscle pain M79.7    Hypokalemia E87.6    Elevated LFTs R79.89    Acute respiratory failure with hypoxia (HCC) J96.01    Pneumonia due to COVID-19 virus U07.1, J12.89    JINA on CPAP G47.33, Z99.89       Respiratory Therapist: Chacho Johnson

## 2020-10-29 NOTE — PROGRESS NOTES
Bedside and Verbal shift change report given to Estela Snyder RN (oncoming nurse) by Zoe Watson RN (offgoing nurse). Report included the following information SBAR, Intake/Output, MAR and Recent Results.

## 2020-10-29 NOTE — PROGRESS NOTES
Patient was given discharge instructions with hard scripts. There was a time for questions and clarifications. Patient's belongings were packed and sent down with patient. [place] the patients ride has come to front of hospital to take patient home. Eliquis free month and voucher was given to patient as well.

## 2020-10-29 NOTE — DISCHARGE INSTRUCTIONS
HOSPITALIST DISCHARGE INSTRUCTIONS    NAME: Darryl Iniguez   :  1960   MRN:  [id number]     Date/Time:  10/29/2020 12:13 PM    ADMIT DATE: 10/23/2020     DISCHARGE DATE: 10/29/2020     DISCHARGE DIAGNOSIS:  Acute respiratory failure with hypoxia   Bilateral pneumonia   COVID-19 infection  Sepsis   Hypokalemia   Elevated LFTs   Obstructive sleep apnea on CPAP at home  [address]:  · It is important that you take the medication exactly as they are prescribed. · Keep your medication in the bottles provided by the pharmacist and keep a list of the medication names, dosages, and times to be taken in your wallet. · Do not take other medications without consulting your doctor. Pain Management: per above medications    What to do at Home    Recommended diet:  Resume previous diet    Recommended activity: Activity as tolerated    If you have questions regarding the hospital related prescriptions or hospital related issues please call [facility] paul Amin at [phone]. If you experience any of the following symptoms then please call your primary care physician or return to the emergency room if you cannot get hold of your doctor:  Fever, chills, nausea, vomiting, diarrhea, change in mentation, falling, bleeding, shortness of breath      Information obtained by :  I understand that if any problems occur once I am at home I am to contact my physician. I understand and acknowledge receipt of the instructions indicated above.                                                                                                                                            Physician's or R.N.'s Signature                                                                  Date/Time                                                                                                                                              Patient or Representative Signature Date/Time

## 2020-10-30 ENCOUNTER — PATIENT OUTREACH (OUTPATIENT)
Dept: CASE MANAGEMENT | Age: 60
End: 2020-10-30

## 2020-10-30 NOTE — PROGRESS NOTES
Patient contacted regarding COVID-19 diagnosis. Discussed COVID-19 related testing which was available at this time. Test results were positive. Patient informed of results, if available? yes. Outreach made within 2 business days of discharge: Yes    Care Transition Nurse/ Ambulatory Care Manager/ LPN Care Coordinator contacted the patient by telephone to perform post discharge assessment. Verified name and  with patient as identifiers. Provided introduction to self, and explanation of the CTN/ACM/LPN role, and reason for call due to risk factors for infection and/or exposure to COVID-19. Symptoms reviewed with patient who verbalized the following symptoms: no worsening symptoms. Due to no new or worsening symptoms encounter was not routed to provider for escalation. Discussed follow-up appointments. If no appointment was previously scheduled, appointment scheduling offered: no  [address] follow up appointment(s):   Future Appointments   Date Time Provider Rose Novak   11/3/2020  2:20 PM Jhonny Gar MD [facility]     Non-[facility] follow up appointment(s): none      Advance Care Planning:   Does patient have an Advance Directive: currently not on file; patient declined education    Patient has following risk factors of: diabetes. CTN/ACM/LPN reviewed discharge instructions, medical action plan and red flags such as increased shortness of breath, increasing fever and signs of decompensation with patient who verbalized understanding. Discussed exposure protocols and quarantine with CDC Guidelines What to do if you are sick with coronavirus disease .  Patient was given an opportunity for questions and concerns. The patient agrees to contact the [facility] exposure line [phone], [facility] department R [facility]  ([phone]) and PCP office for questions related to their healthcare. CTN/ACM provided contact information for future needs.     Reviewed and educated patient on any new and changed medications related to discharge diagnosis. Patient/family/caregiver given information for [employer] and agrees to enroll no  14 day call based on severity of symptoms and risk factors.

## 2020-11-03 ENCOUNTER — VIRTUAL VISIT (OUTPATIENT)
Dept: FAMILY MEDICINE CLINIC | Age: 60
End: 2020-11-03
Payer: COMMERCIAL

## 2020-11-03 DIAGNOSIS — G47.33 OSA ON CPAP: ICD-10-CM

## 2020-11-03 DIAGNOSIS — U07.1 COVID-19 VIRUS INFECTION: Primary | ICD-10-CM

## 2020-11-03 DIAGNOSIS — J12.82 PNEUMONIA DUE TO COVID-19 VIRUS: ICD-10-CM

## 2020-11-03 DIAGNOSIS — Z99.89 OSA ON CPAP: ICD-10-CM

## 2020-11-03 DIAGNOSIS — F41.9 ANXIETY: ICD-10-CM

## 2020-11-03 DIAGNOSIS — U07.1 PNEUMONIA DUE TO COVID-19 VIRUS: ICD-10-CM

## 2020-11-03 PROBLEM — K11.7 XEROSTOMIA: Status: ACTIVE | Noted: 2020-05-10

## 2020-11-03 PROCEDURE — 99203 OFFICE O/P NEW LOW 30 MIN: CPT | Performed by: INTERNAL MEDICINE

## 2020-11-03 RX ORDER — CLONAZEPAM 0.5 MG/1
0.5 TABLET ORAL
Qty: 10 TAB | Refills: 0 | Status: SHIPPED | OUTPATIENT
Start: 2020-11-03 | End: 2020-12-11

## 2020-11-03 NOTE — LETTER
11/5/2020 9:46 AM 
 
Ms. Ramone Hpokins [address] [address] After Visit Summary Francis Lopez: [birth year] QJ: [id number]  
 74/1/9611  2:20 PM   Delbert Berumen at [facility] [phone] After Visit Summary Instructions  
from Valery Jerez MD  
  
 these medications at [place] [address], [address]  
 
 clonazePAM  
Address:  [address] [place] [address] Hours:  24-hours Phone:  [phone] Return 4 weeks when pt tests negative, for office follow up. Today's Visit You saw Valery Jerez MD on Tuesday November 3, 2020. The following issues were addressed: Anxiety, COVID-19 virus infection, Pneumonia due to COVID-19 virus, and JINA on CPAP. What's Next You currently have no upcoming appointments scheduled. Reason for Visit New Patient  Was seen at [facility] for C-19 Current Immunizations Never Reviewed Name  Date Influenza Vaccine [employer]) PF (>6 Mo Flulaval, Fluarix, and >3 Rober Ni [address])  10/29/2020  1:35 PM   
Not reviewed this visit Upcoming Health Maintenance     
Full History Hepatitis C Screening (Once)   Overdue since 1960 DTaP/Tdap/Td series (1 - Tdap)   Overdue since 12/12/1982 Lipid Screen (Every 5 Years)   Overdue since 12/12/2001 Shingrix Vaccine Age 50> (1 of 2)   Overdue since 12/12/2011 Breast Cancer Screen Mammogram (Every 2 Years)   Overdue since 11/19/2015 PAP AKA CERVICAL CYTOLOGY (Every 3 Years)   Overdue since 11/20/2016 Colorectal Cancer Screening Combo   Next due on 6/15/2021 Additional Information about your Body Mass Index (BMI) Your BMI as listed above is considered obese (30 or more). BMI is an estimate of body fat, calculated from your height and weight.   The higher your BMI, the greater your risk of heart disease, high blood pressure, type 2 diabetes, stroke, gallstones, arthritis, sleep apnea, and certain cancers. BMI is not perfect. It may overestimate body fat in athletes and people who are more muscular. Even a small weight loss (between 5 and 10 percent of your current weight) by decreasing your calorie intake and becoming more physically active will help lower your risk of developing or worsening diseases associated with obesity.  
  
Learn more at: [employer] 
  
  
  
  
  
Luis Alberto Lucio introduces [facility] patient portal. Now you can access parts of your medical record, email your doctor's office, and request medication refills online.   
  
1. In your internet browser, go to [email or web address] 2. Click on the First Time User? Click Here link in the Sign In box. You will see the New Member Sign Up page. 3. Enter your [facility] Access Code exactly as it appears below. You will not need to use this code after youve completed the sign-up process. If you do not sign up before the expiration date, you must request a new code. 
  
· [facility] Access Code: [email or web address] 
· Expires: 12/18/2020 10:10 AM 
  
4. Enter the last four digits of your Social Security Number (xxxx) and Date of Birth (mm/dd/yyyy) as indicated and click Submit. You will be taken to the next sign-up page. 5. Create a [facility] ID. This will be your [facility] login ID and cannot be changed, so think of one that is secure and easy to remember. 6. Create a [facility] password. You can change your password at any time. 7. Enter your Password Reset Question and Answer. This can be used at a later time if you forget your password. 8. Enter your e-mail address. You will receive e-mail notification when new information is available in [address] 9. Click Sign Up. You can now view and download portions of your medical record.  
10. Click the Download Summary menu link to download a portable copy of your medical information. 
  
If you have questions, please visit the Frequently Asked Questions section of the MyChart website. Remember, [employer] is NOT to be used for urgent needs. For medical emergencies, dial 911. 
  
  
Now available from your iPhone and Android! 
  
Changes to Your Medication List  
 
(suggestion)   Accurate as of November 3, 2020 11:59 PM. If you have any questions, ask your nurse or doctor. CONTINUE taking these medications CONTINUE taking these medications  
albuterol 90 mcg/actuation inhaler Commonly known as: PROVENTIL HFA, VENTOLIN HFA, PROAIR HFA  Take 2 Puffs by inhalation every four (4) hours as needed for Wheezing or Shortness of Breath. apixaban 5 mg tablet Commonly known as: Eliquis  Take 1 Tab by mouth two (2) times a day for 30 days. ascorbic acid (vitamin C) 1,000 mg tablet Commonly known as: VITAMIN C  Take 1 Tab by mouth daily. benzonatate 200 mg capsule Commonly known as: Tessalon Perles  Take 1 Cap by mouth three (3) times daily as needed for Cough for up to 7 days. clonazePAM 0.5 mg tablet Commonly known as: KlonoPIN  Take 1 Tab by mouth two (2) times daily as needed for Anxiety. Max Daily Amount: 1 mg. Cymbalta 60 mg capsule Generic drug: DULoxetine  Take 60 mg by mouth two (2) times a day. dexAMETHasone 2 mg tablet Commonly known as: DECADRON  3 tabs daily for 4 days 2 tabs daily for 2 days 1 tab daily for 2 days 0.5 Tab daily for 2 days   
gabapentin 300 mg capsule Commonly known as: NEURONTIN  Take 1 Cap by mouth three (3) times daily. guaiFENesin  mg ER tablet Commonly known as: MUCINEX  Take 1 Tab by mouth every twelve (12) hours for 7 days. zinc sulfate 220 (50) mg capsule Commonly known as: ZINCATE  Take 1 Cap by mouth daily for 14 days. Sincerely, Rafa Aguillon MD

## 2020-11-03 NOTE — PROGRESS NOTES
Chief Complaint   Patient presents with    New Patient     Was seen at [facility] for C-19     HPI:  Tammy Palma is a 63 y.o. female who was seen by synchronous (real-time) audio-video technology on 11/3/2020 for New Patient (Was seen at [facility] for C-19)    Assessment & Plan:   Diagnoses and all orders for this visit:    1. COVID-19 virus infection    2. Anxiety  -     clonazePAM (KlonoPIN) 0.5 mg tablet; Take 1 Tab by mouth two (2) times daily as needed for Anxiety. Max Daily Amount: 1 mg.    3. Pneumonia due to COVID-19 virus    4. JINA on CPAP      I spent at least 20 minutes on this visit with this established patient. 712  Subjective:   Tammy Palma is a 63 y.o.  female is seen as a new patient. She was admitted with COVID pneumonia 10/23/20-10/29/20. Patient tested positive 10/29/20. Advised to repeat test after a week. Prior to Admission medications    Medication Sig Start Date End Date Taking? Authorizing Provider   clonazePAM (KlonoPIN) 0.5 mg tablet Take 1 Tab by mouth two (2) times daily as needed for Anxiety. Max Daily Amount: 1 mg. 10/29/20  Yes Marion Urbina MD   guaiFENesin ER (MUCINEX) 600 mg ER tablet Take 1 Tab by mouth every twelve (12) hours for 7 days. 10/29/20 11/5/20 Yes Marion Urbina MD   benzonatate (TESSALON) 200 mg capsule Take 1 Cap by mouth three (3) times daily as needed for Cough for up to 7 days. 10/29/20 11/5/20 Yes Marion Urbina MD   zinc sulfate (ZINCATE) 220 (50) mg capsule Take 1 Cap by mouth daily for 14 days. 10/30/20 11/13/20 Yes Marion Urbina MD   dexAMETHasone (DECADRON) 2 mg tablet 3 tabs daily for 4 days   2 tabs daily for 2 days   1 tab   daily for 2 days  0.5 Tab daily for 2 days 10/30/20  Yes Marion Urbina MD   ascorbic acid, vitamin C, (VITAMIN C) 1,000 mg tablet Take 1 Tab by mouth daily.  10/30/20  Yes Marion Urbina MD   albuterol (PROVENTIL HFA, VENTOLIN HFA, PROAIR HFA) 90 mcg/actuation inhaler Take 2 Puffs by inhalation every four (4) hours as needed for Wheezing or Shortness of Breath. 10/29/20  Yes Nazanin Bull MD   apixaban (Eliquis) 5 mg tablet Take 1 Tab by mouth two (2) times a day for 30 days. 10/29/20 11/28/20 Yes Nazanin Bull MD   gabapentin (NEURONTIN) 300 mg capsule Take 1 Cap by mouth three (3) times daily. 6/24/15  Yes Albert Higgins MD   duloxetine (CYMBALTA) 60 mg capsule Take 60 mg by mouth two (2) times a day. Yes Provider, Historical     Patient Active Problem List   Diagnosis Code    Renal stones N20.0    Fibromyalgia muscle pain M79.7    Hypokalemia E87.6    Elevated LFTs R79.89    Acute respiratory failure with hypoxia (HCC) J96.01    Pneumonia due to COVID-19 virus U07.1, J12.89    JINA on CPAP G47.33, Z99.89    Anxiety F41.9    Xerostomia K11.7     Current Outpatient Medications   Medication Sig Dispense Refill    clonazePAM (KlonoPIN) 0.5 mg tablet Take 1 Tab by mouth two (2) times daily as needed for Anxiety. Max Daily Amount: 1 mg. 10 Tab 0    guaiFENesin ER (MUCINEX) 600 mg ER tablet Take 1 Tab by mouth every twelve (12) hours for 7 days. 14 Tab 0    benzonatate (TESSALON) 200 mg capsule Take 1 Cap by mouth three (3) times daily as needed for Cough for up to 7 days. 21 Cap 0    zinc sulfate (ZINCATE) 220 (50) mg capsule Take 1 Cap by mouth daily for 14 days. 14 Cap 0    dexAMETHasone (DECADRON) 2 mg tablet 3 tabs daily for 4 days   2 tabs daily for 2 days   1 tab   daily for 2 days  0.5 Tab daily for 2 days 19 Tab 0    ascorbic acid, vitamin C, (VITAMIN C) 1,000 mg tablet Take 1 Tab by mouth daily. 14 Tab 0    albuterol (PROVENTIL HFA, VENTOLIN HFA, PROAIR HFA) 90 mcg/actuation inhaler Take 2 Puffs by inhalation every four (4) hours as needed for Wheezing or Shortness of Breath. 1 Inhaler 0    apixaban (Eliquis) 5 mg tablet Take 1 Tab by mouth two (2) times a day for 30 days. 60 Tab 0    gabapentin (NEURONTIN) 300 mg capsule Take 1 Cap by mouth three (3) times daily.  90 Cap 0    duloxetine (CYMBALTA) 60 mg capsule Take 60 mg by mouth two (2) times a day. Allergies   Allergen Reactions    Sulfa (Sulfonamide Antibiotics) Other (comments)     dizziness     Past Medical History:   Diagnosis Date    Fibromyalgia     Kidney stones     passed 67 stones    Other ill-defined conditions(799.89)     kidney stones     Past Surgical History:   Procedure Laterality Date    HX OTHER SURGICAL      lung biopsy     No family history on file. Social History     Tobacco Use    Smoking status: Never Smoker    Smokeless tobacco: Never Used   Substance Use Topics    Alcohol use: No     ROS  As per hpi    Objective:     Patient-Reported Vitals 11/3/2020   Patient-Reported Pulse 96   Patient-Reported Temperature 98.7   Patient-Reported SpO2 94 -96   Patient-Reported Systolic  134   Patient-Reported Diastolic 51      General: alert, cooperative, no distress   Mental  status: normal mood, behavior, speech, dress, motor activity, and thought processes, able to follow commands   HENT: NCAT   Neck: no visualized mass   Resp: no respiratory distress   Neuro: no gross deficits   Skin: no discoloration or lesions of concern on visible areas     Additional exam findings: We discussed the expected course, resolution and complications of the diagnosis(es) in detail. Medication risks, benefits, costs, interactions, and alternatives were discussed as indicated. I advised her to contact the office if her condition worsens, changes or fails to improve as anticipated. She expressed understanding with the diagnosis(es) and plan. Renetta Reis, who was evaluated through a patient-initiated, synchronous (real-time) audio-video encounter, and/or her healthcare decision maker, is aware that it is a billable service, with coverage as determined by her insurance carrier. She provided verbal consent to proceed: Yes, and patient identification was verified.  It was conducted pursuant to the emergency declaration under the [address] and the [address] authority and the [address] and [employer] Act. A caregiver was present when appropriate. Ability to conduct physical exam was limited. I was in the office. The patient was at home.       Valery Jerez MD

## 2020-11-12 ENCOUNTER — PATIENT OUTREACH (OUTPATIENT)
Dept: CASE MANAGEMENT | Age: 60
End: 2020-11-12

## 2020-11-12 ENCOUNTER — OFFICE VISIT (OUTPATIENT)
Dept: URGENT CARE | Age: 60
End: 2020-11-12
Payer: COMMERCIAL

## 2020-11-12 VITALS — RESPIRATION RATE: 14 BRPM | TEMPERATURE: 98.5 F | OXYGEN SATURATION: 96 % | HEART RATE: 81 BPM

## 2020-11-12 DIAGNOSIS — U07.1 COVID-19: Primary | ICD-10-CM

## 2020-11-12 PROCEDURE — 99203 OFFICE O/P NEW LOW 30 MIN: CPT | Performed by: FAMILY MEDICINE

## 2020-11-12 NOTE — LETTER
November 12, 2020 Loretta Hopkins [address] [address] Dear Bernabe Reyna: Thank you for requesting access to [employer]. Please follow the instructions below to securely access and download your online medical record. [employer] allows you to send messages to your doctor, view your test results, renew your prescriptions, schedule appointments, and more. How Do I Sign Up? 1. In your internet browser, go to [email or web address]. 2. Click on the First Time User? Click Here link in the Sign In box. You will see the New Member Sign Up page. 3. Enter your [employer] Access Code exactly as it appears below. You will not need to use this code after youve completed the sign-up process. If you do not sign up before the expiration date, you must request a new code. [employer] Access Code: [email or web address] Expires: 12/27/2020 12:00 PM  
 
4. Enter the last four digits of your Social Security Number (xxxx) and Date of Birth (mm/dd/yyyy) as indicated and click Submit. You will be taken to the next sign-up page. 5. Create a [employer] ID. This will be your [employer] login ID and cannot be changed, so think of one that is secure and easy to remember. 6. Create a [employer] password. You can change your password at any time. 7. Enter your Password Reset Question and Answer. This can be used at a later time if you forget your password. 8. Enter your e-mail address. You will receive e-mail notification when new information is available in [address]. 9. Click Sign Up. You can now view and download portions of your medical record. 10. Click the Download Summary menu link to download a portable copy of your medical information. Additional Information If you have questions, please visit the Frequently Asked Questions section of the [employer] website at [email or web address] Remember, [employer] is NOT to be used for urgent needs. For medical emergencies, dial 911. Now available from your iPhone and Android! Sincerely, [employer]

## 2020-11-12 NOTE — PROGRESS NOTES
This patient was seen in [facility] at [address] [place] [address] [facility]gent Care while in their vehicle due to COVID-19 pandemic with PPE and focused examination in order to decrease community viral transmission. The patient/guardian gave verbal consent to treat. Nadira Marie is a 63 y.o. female who presents for COVID-19 testing. Initially tested positive 4 weeks ago, was hospitalized with bilateral PNA, now improved. Reports residual cough and bilateral mid back pain. Denies fever, SOB. Eating/drinking well. The history is provided by the patient. Past Medical History:   Diagnosis Date    Fibromyalgia     Kidney stones     passed 67 stones    Other ill-defined conditions(799.89)     kidney stones        Past Surgical History:   Procedure Laterality Date    HX OTHER SURGICAL      lung biopsy         No family history on file.      Social History     Socioeconomic History    Marital status: SINGLE     Spouse name: Not on file    Number of children: Not on file    Years of education: Not on file    Highest education level: Not on file   Occupational History    Not on file   Social Needs    Financial resource strain: Not on file    Food insecurity     Worry: Not on file     Inability: Not on file    Transportation needs     Medical: Not on file     Non-medical: Not on file   Tobacco Use    Smoking status: Never Smoker    Smokeless tobacco: Never Used   Substance and Sexual Activity    Alcohol use: No    Drug use: No    Sexual activity: Never   Lifestyle    Physical activity     Days per week: Not on file     Minutes per session: Not on file    Stress: Not on file   Relationships    Social connections     Talks on phone: Not on file     Gets together: Not on file     Attends Sabianism service: Not on file     Active member of club or organization: Not on file     Attends meetings of clubs or organizations: Not on file     Relationship status: Not on file    Intimate partner violence Fear of current or ex partner: Not on file     Emotionally abused: Not on file     Physically abused: Not on file     Forced sexual activity: Not on file   Other Topics Concern    Not on file   Social History Narrative    Not on file                ALLERGIES: Sulfa (sulfonamide antibiotics)    Review of Systems   Constitutional: Negative for activity change, appetite change, chills and fever. HENT: Negative for congestion, rhinorrhea and sore throat. Respiratory: Positive for cough. Negative for shortness of breath and wheezing. Cardiovascular: Negative for chest pain. Gastrointestinal: Negative for abdominal pain, diarrhea, nausea and vomiting. Musculoskeletal: Positive for back pain. Neurological: Negative for headaches. Vitals:    11/12/20 1235   Pulse: 81   Resp: 14   Temp: 98.5 °F (36.9 °C)   SpO2: 96%       Physical Exam  Vitals signs and nursing note reviewed. Constitutional:       General: She is not in acute distress. Appearance: She is well-developed. She is not diaphoretic. Pulmonary:      Effort: Pulmonary effort is normal. No respiratory distress. Breath sounds: Normal breath sounds. No stridor. No wheezing, rhonchi or rales. Neurological:      Mental Status: She is alert. Psychiatric:         Behavior: Behavior normal.         Thought Content:  Thought content normal.         Judgment: Judgment normal.         MDM    ICD-10-CM ICD-9-CM   1. COVID-19  U07.1 079.89       Orders Placed This Encounter    NOVEL CORONAVIRUS (COVID-19)     Scheduling Instructions:      1) Due to current limited availability of the COVID-19 PCR test, tests will be prioritized and may not be completed.              2) Order only if the test result will change clinical management or necessary for a return to mission-critical employment decision.              3) Print and instruct patient to adhere to CDC home isolation program. (Link Above)              4) Set up or refer patient for a monitoring program.              5) Have patient sign up for and leverage MyChart (if not previously done). Order Specific Question:   Is this test for diagnosis or screening? Answer:   Screening     Order Specific Question:   Symptomatic for COVID-19 as defined by CDC? Answer:   No     Order Specific Question:   Hospitalized for COVID-19? Answer:   No     Order Specific Question:   Admitted to ICU for COVID-19? Answer:   No     Order Specific Question:   Employed in healthcare setting? Answer:   No     Order Specific Question:   Resident in a congregate (group) care setting? Answer:   No     Order Specific Question:   Pregnant? Answer:   No     Order Specific Question:   Previously tested for COVID-19? Answer:   Yes          Deep breathing exercises, ambulation      If signs and symptoms become worse the pt is to go to the ER.          Procedures

## 2020-11-15 LAB — SARS-COV-2, NAA: NOT DETECTED

## 2020-11-19 ENCOUNTER — VIRTUAL VISIT (OUTPATIENT)
Dept: FAMILY MEDICINE CLINIC | Age: 60
End: 2020-11-19
Payer: COMMERCIAL

## 2020-11-19 DIAGNOSIS — U07.1 PNEUMONIA DUE TO COVID-19 VIRUS: ICD-10-CM

## 2020-11-19 DIAGNOSIS — Z00.00 ENCOUNTER FOR ANNUAL PHYSICAL EXAM: Primary | ICD-10-CM

## 2020-11-19 DIAGNOSIS — Z11.59 NEED FOR HEPATITIS C SCREENING TEST: ICD-10-CM

## 2020-11-19 DIAGNOSIS — E55.9 VITAMIN D DEFICIENCY: ICD-10-CM

## 2020-11-19 DIAGNOSIS — R73.02 IGT (IMPAIRED GLUCOSE TOLERANCE): ICD-10-CM

## 2020-11-19 DIAGNOSIS — J12.82 PNEUMONIA DUE TO COVID-19 VIRUS: ICD-10-CM

## 2020-11-19 DIAGNOSIS — Z13.29 SCREENING FOR THYROID DISORDER: ICD-10-CM

## 2020-11-19 DIAGNOSIS — M54.9 UPPER BACK PAIN: ICD-10-CM

## 2020-11-19 DIAGNOSIS — E78.5 DYSLIPIDEMIA (HIGH LDL; LOW HDL): ICD-10-CM

## 2020-11-19 DIAGNOSIS — R35.0 FREQUENCY OF URINATION: ICD-10-CM

## 2020-11-19 PROCEDURE — 99214 OFFICE O/P EST MOD 30 MIN: CPT | Performed by: INTERNAL MEDICINE

## 2020-11-19 PROCEDURE — 99396 PREV VISIT EST AGE 40-64: CPT | Performed by: INTERNAL MEDICINE

## 2020-11-19 RX ORDER — METHOCARBAMOL 500 MG/1
500 TABLET, FILM COATED ORAL 3 TIMES DAILY
Qty: 40 TAB | Refills: 0 | Status: SHIPPED | OUTPATIENT
Start: 2020-11-19

## 2020-11-19 NOTE — PROGRESS NOTES
Chief Complaint   Patient presents with    Follow-up     was seen in hospital     Medication Evaluation    Back Pain     started in hospital  /       HPI:  Soto Abad is a 63 y.o. female who was seen by synchronous (real-time) audio-video technology on 11/19/2020 for Follow-up (was seen in hospital ); Medication Evaluation; and Back Pain (started in hospital  /  )    Assessment & Plan:   Diagnoses and all orders for this visit:    1. Encounter for annual physical exam  -     METABOLIC PANEL, COMPREHENSIVE  -     CBC W/O DIFF    2. Pneumonia due to COVID-19 virus  -     apixaban (Eliquis) 5 mg tablet; Take 1 Tab by mouth two (2) times a day for 30 days. -     CBC W/O DIFF    3. Upper back pain  -     methocarbamoL (ROBAXIN) 500 mg tablet; Take 1 Tab by mouth three (3) times daily. 4. Dyslipidemia (high LDL; low HDL)  -     LIPID PANEL    5. IGT (impaired glucose tolerance)  -     HEMOGLOBIN A1C WITH EAG    6. Screening for thyroid disorder  -     TSH 3RD GENERATION    7. Vitamin D deficiency  -     VITAMIN D, 25 HYDROXY    8. Frequency of urination  -     URINALYSIS W/ RFLX MICROSCOPIC    9. Need for hepatitis C screening test  -     HEPATITIS C AB      I spent at least 20 minutes on this visit with this established patient. 712  Subjective:   Soto Abad is a 63 y.o.  female with h/o COVID pneumonia 10/23/20-10/29/20. Patient tested negative 11/12/20. Today she has a new complaints of upper back pain radiating to shoulders since pneumonia. Denies cough, sob, fever/chills. Also, she is requesting for refill on Eliquis. Prior to Admission medications    Medication Sig Start Date End Date Taking? Authorizing Provider   clonazePAM (KlonoPIN) 0.5 mg tablet Take 1 Tab by mouth two (2) times daily as needed for Anxiety. Max Daily Amount: 1 mg. 11/3/20  Yes Kyree Ruffin MD   ascorbic acid, vitamin C, (VITAMIN C) 1,000 mg tablet Take 1 Tab by mouth daily.  10/30/20  Yes Nehal Hong MD albuterol (PROVENTIL HFA, VENTOLIN HFA, PROAIR HFA) 90 mcg/actuation inhaler Take 2 Puffs by inhalation every four (4) hours as needed for Wheezing or Shortness of Breath. 10/29/20  Yes Yi Arrington MD   apixaban (Eliquis) 5 mg tablet Take 1 Tab by mouth two (2) times a day for 30 days. 10/29/20 11/28/20 Yes Yi Arrington MD   duloxetine (CYMBALTA) 60 mg capsule Take 60 mg by mouth two (2) times a day. Yes Provider, Historical   gabapentin (NEURONTIN) 300 mg capsule Take 1 Cap by mouth three (3) times daily. 6/24/15 11/19/20  Marcello Mueller MD     Patient Active Problem List   Diagnosis Code    Renal stones N20.0    Fibromyalgia muscle pain M79.7    Hypokalemia E87.6    Elevated LFTs R79.89    Acute respiratory failure with hypoxia ([facility] [address] J96.01    Pneumonia due to COVID-19 virus U07.1, J12.89    JINA on CPAP G47.33, Z99.89    Anxiety F41.9    Xerostomia K11.7     Current Outpatient Medications   Medication Sig Dispense Refill    clonazePAM (KlonoPIN) 0.5 mg tablet Take 1 Tab by mouth two (2) times daily as needed for Anxiety. Max Daily Amount: 1 mg. 10 Tab 0    ascorbic acid, vitamin C, (VITAMIN C) 1,000 mg tablet Take 1 Tab by mouth daily. 14 Tab 0    albuterol (PROVENTIL HFA, VENTOLIN HFA, PROAIR HFA) 90 mcg/actuation inhaler Take 2 Puffs by inhalation every four (4) hours as needed for Wheezing or Shortness of Breath. 1 Inhaler 0    apixaban (Eliquis) 5 mg tablet Take 1 Tab by mouth two (2) times a day for 30 days. 60 Tab 0    duloxetine (CYMBALTA) 60 mg capsule Take 60 mg by mouth two (2) times a day. Allergies   Allergen Reactions    Sulfa (Sulfonamide Antibiotics) Other (comments)     dizziness     Past Medical History:   Diagnosis Date    Fibromyalgia     Kidney stones     passed 67 stones    Other ill-defined conditions(799.89)     kidney stones     Past Surgical History:   Procedure Laterality Date    HX OTHER SURGICAL      lung biopsy     History reviewed.  No pertinent family history. Social History     Tobacco Use    Smoking status: Never Smoker    Smokeless tobacco: Never Used   Substance Use Topics    Alcohol use: No     ROS:  As per hpi    Objective:     Patient-Reported Vitals 11/19/2020   Patient-Reported Pulse 84   Patient-Reported Temperature -   Patient-Reported SpO2 -   Patient-Reported Systolic  661   Patient-Reported Diastolic 71      General: alert, cooperative, no distress   Mental  status: normal mood, behavior, speech, dress, motor activity, and thought processes, able to follow commands   HENT: NCAT   Neck: no visualized mass   Resp: no respiratory distress   Neuro: no gross deficits   Skin: no discoloration or lesions of concern on visible areas     Additional exam findings: We discussed the expected course, resolution and complications of the diagnosis(es) in detail. Medication risks, benefits, costs, interactions, and alternatives were discussed as indicated. I advised her to contact the office if her condition worsens, changes or fails to improve as anticipated. She expressed understanding with the diagnosis(es) and plan. Maureen Ferrari, who was evaluated through a patient-initiated, synchronous (real-time) audio-video encounter, and/or her healthcare decision maker, is aware that it is a billable service, with coverage as determined by her insurance carrier. She provided verbal consent to proceed: Yes, and patient identification was verified. It was conducted pursuant to the emergency declaration under the [address] [place] [address], [address] [place] [address] authority and the [employer] and [employer] Act. A caregiver was present when appropriate. Ability to conduct physical exam was limited. I was in the office. The patient was at home.       Kayli Grace MD

## 2020-11-21 LAB
25(OH)D3+25(OH)D2 SERPL-MCNC: 10.1 NG/ML (ref 30–100)
ALBUMIN SERPL-MCNC: 4.3 G/DL (ref 3.8–4.9)
ALBUMIN/GLOB SERPL: 1.7 {RATIO} (ref 1.2–2.2)
ALP SERPL-CCNC: 67 IU/L (ref 39–117)
ALT SERPL-CCNC: 70 IU/L (ref 0–32)
APPEARANCE UR: CLEAR
AST SERPL-CCNC: 63 IU/L (ref 0–40)
BILIRUB SERPL-MCNC: 0.3 MG/DL (ref 0–1.2)
BILIRUB UR QL STRIP: NEGATIVE
BUN SERPL-MCNC: 13 MG/DL (ref 6–24)
BUN/CREAT SERPL: 18 (ref 9–23)
CALCIUM SERPL-MCNC: 8.9 MG/DL (ref 8.7–10.2)
CHLORIDE SERPL-SCNC: 98 MMOL/L (ref 96–106)
CHOLEST SERPL-MCNC: 216 MG/DL (ref 100–199)
CO2 SERPL-SCNC: 23 MMOL/L (ref 20–29)
COLOR UR: YELLOW
CREAT SERPL-MCNC: 0.73 MG/DL (ref 0.57–1)
ERYTHROCYTE [DISTWIDTH] IN BLOOD BY AUTOMATED COUNT: 13.7 % (ref 11.7–15.4)
EST. AVERAGE GLUCOSE BLD GHB EST-MCNC: 120 MG/DL
GLOBULIN SER CALC-MCNC: 2.5 G/DL (ref 1.5–4.5)
GLUCOSE SERPL-MCNC: 96 MG/DL (ref 65–99)
GLUCOSE UR QL: NEGATIVE
HBA1C MFR BLD: 5.8 % (ref 4.8–5.6)
HCT VFR BLD AUTO: 38.4 % (ref 34–46.6)
HCV AB S/CO SERPL IA: 0.3 S/CO RATIO (ref 0–0.9)
HDLC SERPL-MCNC: 39 MG/DL
HGB BLD-MCNC: 12.9 G/DL (ref 11.1–15.9)
HGB UR QL STRIP: NEGATIVE
KETONES UR QL STRIP: NEGATIVE
LDLC SERPL CALC-MCNC: 120 MG/DL (ref 0–99)
LEUKOCYTE ESTERASE UR QL STRIP: NEGATIVE
MCH RBC QN AUTO: 29.3 PG (ref 26.6–33)
MCHC RBC AUTO-ENTMCNC: 33.6 G/DL (ref 31.5–35.7)
MCV RBC AUTO: 87 FL (ref 79–97)
MICRO URNS: NORMAL
NITRITE UR QL STRIP: NEGATIVE
PH UR STRIP: 6 [PH] (ref 5–7.5)
PLATELET # BLD AUTO: 216 X10E3/UL (ref 150–450)
POTASSIUM SERPL-SCNC: 3.2 MMOL/L (ref 3.5–5.2)
PROT SERPL-MCNC: 6.8 G/DL (ref 6–8.5)
PROT UR QL STRIP: NEGATIVE
RBC # BLD AUTO: 4.4 X10E6/UL (ref 3.77–5.28)
SODIUM SERPL-SCNC: 139 MMOL/L (ref 134–144)
SP GR UR: 1.02 (ref 1–1.03)
TRIGL SERPL-MCNC: 325 MG/DL (ref 0–149)
TSH SERPL DL<=0.005 MIU/L-ACNC: 3.76 UIU/ML (ref 0.45–4.5)
UROBILINOGEN UR STRIP-MCNC: 0.2 MG/DL (ref 0.2–1)
VLDLC SERPL CALC-MCNC: 57 MG/DL (ref 5–40)
WBC # BLD AUTO: 4.9 X10E3/UL (ref 3.4–10.8)

## 2020-12-01 ENCOUNTER — TELEPHONE (OUTPATIENT)
Dept: FAMILY MEDICINE CLINIC | Age: 60
End: 2020-12-01

## 2020-12-01 NOTE — TELEPHONE ENCOUNTER
[facility] clinical Services indicated patient received the follow ing scripts ELIQUIS AND MELOXICAM may result in an increased risk of bleeding, due to the inhibition of the clotting cascade by multiple mechanisms. Per INGENIO  Please consider closely monitoring for signs and symptoms of bleeding.

## 2020-12-11 ENCOUNTER — OFFICE VISIT (OUTPATIENT)
Dept: FAMILY MEDICINE CLINIC | Age: 60
End: 2020-12-11
Payer: COMMERCIAL

## 2020-12-11 VITALS
RESPIRATION RATE: 20 BRPM | OXYGEN SATURATION: 96 % | DIASTOLIC BLOOD PRESSURE: 60 MMHG | SYSTOLIC BLOOD PRESSURE: 133 MMHG | TEMPERATURE: 96.8 F | BODY MASS INDEX: 40.01 KG/M2 | WEIGHT: 264 LBS | HEIGHT: 68 IN | HEART RATE: 87 BPM

## 2020-12-11 DIAGNOSIS — R79.89 ELEVATED LFTS: ICD-10-CM

## 2020-12-11 DIAGNOSIS — R73.02 IGT (IMPAIRED GLUCOSE TOLERANCE): ICD-10-CM

## 2020-12-11 DIAGNOSIS — E66.01 OBESITY, MORBID (HCC): ICD-10-CM

## 2020-12-11 DIAGNOSIS — H11.32 SUBCONJUNCTIVAL BLEED, LEFT: Primary | ICD-10-CM

## 2020-12-11 DIAGNOSIS — E78.2 MIXED HYPERCHOLESTEROLEMIA AND HYPERTRIGLYCERIDEMIA: ICD-10-CM

## 2020-12-11 DIAGNOSIS — E55.9 VITAMIN D DEFICIENCY: ICD-10-CM

## 2020-12-11 PROCEDURE — 99213 OFFICE O/P EST LOW 20 MIN: CPT | Performed by: INTERNAL MEDICINE

## 2020-12-11 RX ORDER — GEMFIBROZIL 600 MG/1
600 TABLET, FILM COATED ORAL 2 TIMES DAILY
Qty: 60 TAB | Refills: 2 | Status: SHIPPED | OUTPATIENT
Start: 2020-12-11 | End: 2021-02-26

## 2020-12-11 RX ORDER — CHOLECALCIFEROL (VITAMIN D3) 125 MCG
5000 CAPSULE ORAL DAILY
Qty: 30 CAP | Refills: 3 | Status: SHIPPED | OUTPATIENT
Start: 2020-12-11 | End: 2021-12-09 | Stop reason: ALTCHOICE

## 2020-12-11 RX ORDER — CLONIDINE HYDROCHLORIDE 0.1 MG/1
TABLET ORAL
COMMUNITY
Start: 2020-12-06

## 2020-12-11 RX ORDER — BUSPIRONE HYDROCHLORIDE 7.5 MG/1
TABLET ORAL
COMMUNITY
Start: 2020-12-06 | End: 2021-12-09 | Stop reason: DRUGHIGH

## 2020-12-11 RX ORDER — GABAPENTIN 300 MG/1
CAPSULE ORAL
COMMUNITY
Start: 2020-12-07

## 2020-12-11 RX ORDER — ESTROGENS, CONJUGATED 0.62 MG/1
TABLET, FILM COATED ORAL
COMMUNITY
Start: 2020-11-24

## 2020-12-11 NOTE — LETTER
12/11/2020 Ms. Moreno Maryannealia Hopkins [address] [address] Dear Josh Maryanne: 
 
Please find your most recent results below. Cholesterol is up, liver function is elevated, vit D is low, A1C is at borderline diabetes Resulted Orders METABOLIC PANEL, COMPREHENSIVE (Collected: 11/20/2020 12:05 PM) Result Value Ref Range Glucose 96 65 - 99 mg/dL BUN 13 6 - 24 mg/dL Creatinine 0.73 0.57 - 1.00 mg/dL GFR est non-AA 91 >59 mL/min/1.73 GFR est  >59 mL/min/1.73  
 BUN/Creatinine ratio 18 9 - 23 Sodium 139 134 - 144 mmol/L Potassium 3.2 (L) 3.5 - 5.2 mmol/L Chloride 98 96 - 106 mmol/L  
 CO2 23 20 - 29 mmol/L Calcium 8.9 8.7 - 10.2 mg/dL Protein, total 6.8 6.0 - 8.5 g/dL Albumin 4.3 3.8 - 4.9 g/dL GLOBULIN, TOTAL 2.5 1.5 - 4.5 g/dL A-G Ratio 1.7 1.2 - 2.2 Bilirubin, total 0.3 0.0 - 1.2 mg/dL Alk. phosphatase 67 39 - 117 IU/L  
 AST (SGOT) 63 (H) 0 - 40 IU/L  
 ALT (SGPT) 70 (H) 0 - 32 IU/L Narrative Performed at:  [address]  [id number] : Jesi Hardwick MD, Phone:  [phone] CBC W/O DIFF (Collected: 11/20/2020 12:05 PM) Result Value Ref Range WBC 4.9 3.4 - 10.8 x10E3/uL  
 RBC 4.40 3.77 - 5.28 x10E6/uL HGB 12.9 11.1 - 15.9 g/dL HCT 38.4 34.0 - 46.6 % MCV 87 79 - 97 fL  
 MCH 29.3 26.6 - 33.0 pg  
 MCHC 33.6 31.5 - 35.7 g/dL  
 RDW 13.7 11.7 - 15.4 % PLATELET 827 823 - 230 x10E3/uL Narrative Performed at:  [address]  [id number] : Jesi Hardwick MD, Phone:  [phone] LIPID PANEL (Collected: 11/20/2020 12:05 PM) Result Value Ref Range Cholesterol, total 216 (H) 100 - 199 mg/dL Triglyceride 325 (H) 0 - 149 mg/dL HDL Cholesterol 39 (L) >39 mg/dL VLDL Cholesterol Jonathan 57 (H) 5 - 40 mg/dL LDL Chol Calc (NIH) 120 (H) 0 - 99 mg/dL Narrative Performed at:  [address]  [id number] : Sheri Valentine MD, Phone:  [phone] HEMOGLOBIN A1C WITH EAG (Collected: 11/20/2020 12:05 PM) Result Value Ref Range Hemoglobin A1c 5.8 (H) 4.8 - 5.6 % Comment:  
            Prediabetes: 5.7 - 6.4 Diabetes: >6.4 Glycemic control for adults with diabetes: <7.0 Estimated average glucose 120 mg/dL Narrative Performed at:  [address]  [id number] : Sheri Valentine MD, Phone:  [phone] TSH 3RD GENERATION (Collected: 11/20/2020 12:05 PM) Result Value Ref Range TSH 3.760 0.450 - 4.500 uIU/mL Narrative Performed at:  [address]  [id number] : Sheri Valentine MD, Phone:  [phone] VITAMIN D, 25 HYDROXY (Collected: 11/20/2020 12:05 PM) Result Value Ref Range VITAMIN D, 25-HYDROXY 10.1 (L) 30.0 - 100.0 ng/mL Comment:  
   Vitamin D deficiency has been defined by the [address] [place] [address] practice guideline as a 
level of serum 25-OH vitamin D less than 20 ng/mL (1,2). The Endocrine Society went on to further define vitamin D 
insufficiency as a level between 21 and 29 ng/mL (2). 1. IOM ([place] of Medicine). 2010. Dietary reference 
   intakes for calcium and D. [facility]: The 
   [employer]. 2. Ancelmo MF, Everardo NC, Lorraine SHEPHERD, et al. 
   Evaluation, treatment, and prevention of vitamin D 
   deficiency: an Endocrine Society clinical practice 
   guideline. JCEM. 2011 Jul; 96(7):1911-30. Narrative Performed at:  [address]  [id number] : Sheri Valentine MD, Phone:  [phone] URINALYSIS W/ RFLX MICROSCOPIC (Collected: 11/20/2020 12:05 PM) Result Value Ref Range  Specific Gravity 1.023 1.005 - 1.030  
 pH (UA) 6.0 5.0 - 7.5 Color Yellow Yellow Appearance Clear Clear Leukocyte Esterase Negative Negative Protein Negative Negative/Trace Glucose Negative Negative Ketone Negative Negative Blood Negative Negative Bilirubin Negative Negative Urobilinogen 0.2 0.2 - 1.0 mg/dL Nitrites Negative Negative Microscopic Examination Comment Comment:  
   Microscopic not indicated and not performed. Narrative Performed at:  [address]  [id number] : Soniya Akins MD, Phone:  [phone] HEPATITIS C AB (Collected: 11/20/2020 12:05 PM) Result Value Ref Range Hep C Virus Ab 0.3 0.0 - 0.9 s/co ratio Comment:  
                                     Negative:     < 0.8 Indeterminate: 0.8 - 0.9 Positive:     > 0.9 The CDC recommends that a positive HCV antibody result 
 be followed up with a HCV Nucleic Acid Amplification 
 test (157561). Narrative Performed at:  [address]  [id number] : Soniya Akins MD, Phone:  [phone] RECOMMENDATIONS: 
Work on diet and exercise. Prescription(s) for cholesterol and vit D are in pharmacy Please call me if you have any questions: [phone] Sincerely, Pamela Borjas MD

## 2020-12-11 NOTE — PATIENT INSTRUCTIONS
Subconjunctival Hemorrhage: Care Instructions Your Care Instructions Sometimes small blood vessels in the white of the eye can break, causing a red spot or speck. This is called a subconjunctival hemorrhage. The blood vessels may break when you sneeze, cough, vomit, strain, or bend over. Sometimes there is no clear cause. The blood may look alarming, especially if the spot is large. If there is no pain or vision change, there is usually no reason to worry, and the blood slowly will go away on its own in 2 to 3 weeks. Follow-up care is a key part of your treatment and safety. Be sure to make and go to all appointments, and call your doctor if you are having problems. It's also a good idea to know your test results and keep a list of the medicines you take. How can you care for yourself at home? · Watch for changes in your eye. It is normal for the red spot on your eyeball to change color as it heals. Just like a bruise on your skin, it may change from red to brown to purple to yellow. · Do not take aspirin or products that contain aspirin, which can increase bleeding. Use acetaminophen (Tylenol) if you need pain relief for another problem. · Do not take two or more pain medicines at the same time unless the doctor told you to. Many pain medicines have acetaminophen, which is Tylenol. Too much acetaminophen (Tylenol) can be harmful. When should you call for help? Call your doctor now or seek immediate medical care if: 
  · You have signs of an eye infection, such as: 
? Pus or thick discharge coming from the eye. 
? Redness or swelling around the eye. 
? A fever.  
  · You see blood over the black part of your eye (pupil).  
  · You have any changes or problems in your vision.  
  · You have any pain in your eye. Watch closely for changes in your health, and be sure to contact your doctor if: 
  · You do not get better as expected. Where can you learn more? Go to http://www.gray.com/ Enter A375 in the search box to learn more about \"Subconjunctival Hemorrhage: Care Instructions. \" Current as of: December 18, 2019               Content Version: 12.6 © 3666-2413 [employer]. Care instructions adapted under license by [employer] (which disclaims liability or warranty for this information). If you have questions about a medical condition or this instruction, always ask your healthcare professional. [address] any warranty or liability for your use of this information.

## 2020-12-11 NOTE — PROGRESS NOTES
Chief Complaint   Patient presents with    Red Eye     x 2 days     HPI:  Nereida Vazquez is a 63 y.o.  female presents for red eye symptoms ongoing for 2 days. Patient was discharged on Eliquis following admission with COVID-19 to be continued for 2 month On physical she has subconjunctival bleeding of left eye. Denies pain, blurred vision. Appreciate Dr. Derrick Lucas input who advised if patient no symptoms she may continue Eliquis 2 months is completed. Also, most recent lab results have been discussed with pt. Cholesterol is up, liver function is elevated, vit D is low, A1C is at borderline diabetes level. Review of Systems  As per hpi    Past Medical History:   Diagnosis Date    Fibromyalgia     Hypertension     Kidney stones     passed 67 stones    Other ill-defined conditions(799.89)     kidney stones     Past Surgical History:   Procedure Laterality Date    HX OTHER SURGICAL      lung biopsy     Social History     Socioeconomic History    Marital status: SINGLE     Spouse name: Not on file    Number of children: Not on file    Years of education: Not on file    Highest education level: Not on file   Tobacco Use    Smoking status: Never Smoker    Smokeless tobacco: Never Used   Substance and Sexual Activity    Alcohol use: No    Drug use: No    Sexual activity: Never     No family history on file. Current Outpatient Medications   Medication Sig Dispense Refill    cloNIDine HCL (CATAPRES) 0.1 mg tablet       gabapentin (NEURONTIN) 300 mg capsule       busPIRone (BUSPAR) 7.5 mg tablet       Premarin 0.625 mg tablet       methocarbamoL (ROBAXIN) 500 mg tablet Take 1 Tab by mouth three (3) times daily. 40 Tab 0    ascorbic acid, vitamin C, (VITAMIN C) 1,000 mg tablet Take 1 Tab by mouth daily. 14 Tab 0    albuterol (PROVENTIL HFA, VENTOLIN HFA, PROAIR HFA) 90 mcg/actuation inhaler Take 2 Puffs by inhalation every four (4) hours as needed for Wheezing or Shortness of Breath. 1 Inhaler 0    duloxetine (CYMBALTA) 60 mg capsule Take 60 mg by mouth two (2) times a day. Allergies   Allergen Reactions    Sulfa (Sulfonamide Antibiotics) Other (comments)     dizziness     Objective:  Visit Vitals  /60   Pulse 87   Temp 96.8 °F (36 °C) (Temporal)   Resp 20   Ht 5' 8\" (1.727 m)   Wt 264 lb (119.7 kg)   SpO2 96%   BMI 40.14 kg/m²     Physical Exam:   General appearance - alert, well appearing in no distress  Mental status - alert, oriented to person, place, and time  EYE-PERRL, EOMI, left conjunctival ejection  Chest - clear to auscultation, no wheezes, rales or rhonchi  Heart - normal rate, regular rhythm, no murmurs  Abdomen - soft, nontender, nondistended, no organomegaly  Ext-peripheral pulses normal, no pedal edema    Results for orders placed or performed in visit on 70/92/40   METABOLIC PANEL, COMPREHENSIVE   Result Value Ref Range    Glucose 96 65 - 99 mg/dL    BUN 13 6 - 24 mg/dL    Creatinine 0.73 0.57 - 1.00 mg/dL    GFR est non-AA 91 >59 mL/min/1.73    GFR est  >59 mL/min/1.73    BUN/Creatinine ratio 18 9 - 23    Sodium 139 134 - 144 mmol/L    Potassium 3.2 (L) 3.5 - 5.2 mmol/L    Chloride 98 96 - 106 mmol/L    CO2 23 20 - 29 mmol/L    Calcium 8.9 8.7 - 10.2 mg/dL    Protein, total 6.8 6.0 - 8.5 g/dL    Albumin 4.3 3.8 - 4.9 g/dL    GLOBULIN, TOTAL 2.5 1.5 - 4.5 g/dL    A-G Ratio 1.7 1.2 - 2.2    Bilirubin, total 0.3 0.0 - 1.2 mg/dL    Alk.  phosphatase 67 39 - 117 IU/L    AST (SGOT) 63 (H) 0 - 40 IU/L    ALT (SGPT) 70 (H) 0 - 32 IU/L   CBC W/O DIFF   Result Value Ref Range    WBC 4.9 3.4 - 10.8 x10E3/uL    RBC 4.40 3.77 - 5.28 x10E6/uL    HGB 12.9 11.1 - 15.9 g/dL    HCT 38.4 34.0 - 46.6 %    MCV 87 79 - 97 fL    MCH 29.3 26.6 - 33.0 pg    MCHC 33.6 31.5 - 35.7 g/dL    RDW 13.7 11.7 - 15.4 %    PLATELET 595 328 - 922 x10E3/uL   LIPID PANEL   Result Value Ref Range    Cholesterol, total 216 (H) 100 - 199 mg/dL    Triglyceride 325 (H) 0 - 149 mg/dL    HDL Cholesterol 39 (L) >39 mg/dL    VLDL Cholesterol Jonathan 57 (H) 5 - 40 mg/dL    LDL Chol Calc (NIH) 120 (H) 0 - 99 mg/dL   HEMOGLOBIN A1C WITH EAG   Result Value Ref Range    Hemoglobin A1c 5.8 (H) 4.8 - 5.6 %    Estimated average glucose 120 mg/dL   TSH 3RD GENERATION   Result Value Ref Range    TSH 3.760 0.450 - 4.500 uIU/mL   VITAMIN D, 25 HYDROXY   Result Value Ref Range    VITAMIN D, 25-HYDROXY 10.1 (L) 30.0 - 100.0 ng/mL   URINALYSIS W/ RFLX MICROSCOPIC   Result Value Ref Range    Specific Gravity 1.023 1.005 - 1.030    pH (UA) 6.0 5.0 - 7.5    Color Yellow Yellow    Appearance Clear Clear    Leukocyte Esterase Negative Negative    Protein Negative Negative/Trace    Glucose Negative Negative    Ketone Negative Negative    Blood Negative Negative    Bilirubin Negative Negative    Urobilinogen 0.2 0.2 - 1.0 mg/dL    Nitrites Negative Negative    Microscopic Examination Comment    HEPATITIS C AB   Result Value Ref Range    Hep C Virus Ab 0.3 0.0 - 0.9 s/co ratio       Assessment/Plan:  Diagnoses and all orders for this visit:    Subconjunctival bleed, left  -     REFERRAL TO OPHTHALMOLOGY    Obesity, morbid (HCC)    Elevated LFTs    Mixed hypercholesterolemia and hypertriglyceridemia  -     gemfibroziL (LOPID) 600 mg tablet; Take 1 Tab by mouth two (2) times a day., Normal, Disp-60 Tab,R-2    IGT (impaired glucose tolerance)    Vitamin D deficiency  -     cholecalciferol (VITAMIN D3) (5000 Units /125 mcg) capsule; Take 1 Cap by mouth daily. , Normal, Disp-30 Cap,R-3        Patient Instructions          Subconjunctival Hemorrhage: Care Instructions  Your Care Instructions     Sometimes small blood vessels in the white of the eye can break, causing a red spot or speck. This is called a subconjunctival hemorrhage. The blood vessels may break when you sneeze, cough, vomit, strain, or bend over. Sometimes there is no clear cause. The blood may look alarming, especially if the spot is large.  If there is no pain or vision change, there is usually no reason to worry, and the blood slowly will go away on its own in 2 to 3 weeks. Follow-up care is a key part of your treatment and safety. Be sure to make and go to all appointments, and call your doctor if you are having problems. It's also a good idea to know your test results and keep a list of the medicines you take. How can you care for yourself at home? · Watch for changes in your eye. It is normal for the red spot on your eyeball to change color as it heals. Just like a bruise on your skin, it may change from red to brown to purple to yellow. · Do not take aspirin or products that contain aspirin, which can increase bleeding. Use acetaminophen (Tylenol) if you need pain relief for another problem. · Do not take two or more pain medicines at the same time unless the doctor told you to. Many pain medicines have acetaminophen, which is Tylenol. Too much acetaminophen (Tylenol) can be harmful. When should you call for help? Call your doctor now or seek immediate medical care if:    · You have signs of an eye infection, such as:  ? Pus or thick discharge coming from the eye.  ? Redness or swelling around the eye.  ? A fever.     · You see blood over the black part of your eye (pupil).     · You have any changes or problems in your vision.     · You have any pain in your eye. Watch closely for changes in your health, and be sure to contact your doctor if:    · You do not get better as expected. Where can you learn more? Go to http://www.gray.com/  Enter K925 in the search box to learn more about \"Subconjunctival Hemorrhage: Care Instructions. \"  Current as of: December 18, 2019               Content Version: 12.6  © 7439-3239 Healthwise, Incorporated. Care instructions adapted under license by [employer] (which disclaims liability or warranty for this information).  If you have questions about a medical condition or this instruction, always ask your healthcare professional. [place] [address] any warranty or liability for your use of this information. Follow-up and Dispositions    · Return if symptoms worsen or fail to improve, for keep appointmemt.

## 2020-12-16 DIAGNOSIS — U07.1 PNEUMONIA DUE TO COVID-19 VIRUS: ICD-10-CM

## 2020-12-16 DIAGNOSIS — J12.82 PNEUMONIA DUE TO COVID-19 VIRUS: ICD-10-CM

## 2020-12-16 RX ORDER — APIXABAN 5 MG/1
TABLET, FILM COATED ORAL
Qty: 60 TAB | Refills: 0 | Status: SHIPPED | OUTPATIENT
Start: 2020-12-16 | End: 2021-12-09 | Stop reason: ALTCHOICE

## 2021-02-25 DIAGNOSIS — E78.2 MIXED HYPERCHOLESTEROLEMIA AND HYPERTRIGLYCERIDEMIA: ICD-10-CM

## 2021-02-26 RX ORDER — GEMFIBROZIL 600 MG/1
TABLET, FILM COATED ORAL
Qty: 60 TAB | Refills: 2 | Status: SHIPPED | OUTPATIENT
Start: 2021-02-26 | End: 2021-06-07

## 2021-06-04 DIAGNOSIS — E78.2 MIXED HYPERCHOLESTEROLEMIA AND HYPERTRIGLYCERIDEMIA: ICD-10-CM

## 2021-06-07 RX ORDER — GEMFIBROZIL 600 MG/1
TABLET, FILM COATED ORAL
Qty: 60 TABLET | Refills: 2 | Status: SHIPPED | OUTPATIENT
Start: 2021-06-07 | End: 2021-09-07

## 2021-09-03 DIAGNOSIS — E78.2 MIXED HYPERCHOLESTEROLEMIA AND HYPERTRIGLYCERIDEMIA: ICD-10-CM

## 2021-09-07 RX ORDER — GEMFIBROZIL 600 MG/1
TABLET, FILM COATED ORAL
Qty: 60 TABLET | Refills: 0 | Status: SHIPPED | OUTPATIENT
Start: 2021-09-07 | End: 2021-10-14

## 2021-10-11 DIAGNOSIS — E78.2 MIXED HYPERCHOLESTEROLEMIA AND HYPERTRIGLYCERIDEMIA: ICD-10-CM

## 2021-10-14 RX ORDER — GEMFIBROZIL 600 MG/1
TABLET, FILM COATED ORAL
Qty: 60 TABLET | Refills: 0 | Status: SHIPPED | OUTPATIENT
Start: 2021-10-14 | End: 2021-12-09 | Stop reason: SDUPTHER

## 2021-12-09 ENCOUNTER — OFFICE VISIT (OUTPATIENT)
Dept: FAMILY MEDICINE CLINIC | Age: 61
End: 2021-12-09
Payer: COMMERCIAL

## 2021-12-09 VITALS
OXYGEN SATURATION: 100 % | WEIGHT: 240 LBS | RESPIRATION RATE: 20 BRPM | BODY MASS INDEX: 36.37 KG/M2 | TEMPERATURE: 97.7 F | DIASTOLIC BLOOD PRESSURE: 67 MMHG | SYSTOLIC BLOOD PRESSURE: 129 MMHG | HEART RATE: 83 BPM | HEIGHT: 68 IN

## 2021-12-09 DIAGNOSIS — E78.5 DYSLIPIDEMIA (HIGH LDL; LOW HDL): ICD-10-CM

## 2021-12-09 DIAGNOSIS — Z00.00 ENCOUNTER FOR ANNUAL PHYSICAL EXAM: Primary | ICD-10-CM

## 2021-12-09 DIAGNOSIS — R73.02 IGT (IMPAIRED GLUCOSE TOLERANCE): ICD-10-CM

## 2021-12-09 DIAGNOSIS — F41.9 ANXIETY: ICD-10-CM

## 2021-12-09 DIAGNOSIS — E66.01 OBESITY, MORBID (HCC): ICD-10-CM

## 2021-12-09 DIAGNOSIS — E78.2 MIXED HYPERCHOLESTEROLEMIA AND HYPERTRIGLYCERIDEMIA: ICD-10-CM

## 2021-12-09 DIAGNOSIS — Z23 ENCOUNTER FOR IMMUNIZATION: ICD-10-CM

## 2021-12-09 DIAGNOSIS — R79.89 ELEVATED LFTS: ICD-10-CM

## 2021-12-09 DIAGNOSIS — E55.9 VITAMIN D DEFICIENCY: ICD-10-CM

## 2021-12-09 DIAGNOSIS — R35.0 FREQUENCY OF URINATION: ICD-10-CM

## 2021-12-09 DIAGNOSIS — Z13.29 SCREENING FOR THYROID DISORDER: ICD-10-CM

## 2021-12-09 DIAGNOSIS — Z23 NEEDS FLU SHOT: ICD-10-CM

## 2021-12-09 PROCEDURE — 90686 IIV4 VACC NO PRSV 0.5 ML IM: CPT | Performed by: INTERNAL MEDICINE

## 2021-12-09 PROCEDURE — 99396 PREV VISIT EST AGE 40-64: CPT | Performed by: INTERNAL MEDICINE

## 2021-12-09 PROCEDURE — 90471 IMMUNIZATION ADMIN: CPT | Performed by: INTERNAL MEDICINE

## 2021-12-09 RX ORDER — CHOLECALCIFEROL (VITAMIN D3) 125 MCG
5000 CAPSULE ORAL DAILY
Qty: 30 CAPSULE | Refills: 3 | Status: SHIPPED | OUTPATIENT
Start: 2021-12-09 | End: 2022-04-03

## 2021-12-09 RX ORDER — ZOSTER VACCINE RECOMBINANT, ADJUVANTED 50 MCG/0.5
0.5 KIT INTRAMUSCULAR ONCE
Qty: 0.5 ML | Refills: 1 | Status: SHIPPED | OUTPATIENT
Start: 2021-12-09 | End: 2021-12-09

## 2021-12-09 RX ORDER — BUSPIRONE HYDROCHLORIDE 15 MG/1
15 TABLET ORAL 3 TIMES DAILY
Qty: 30 TABLET | Refills: 2 | Status: SHIPPED | OUTPATIENT
Start: 2021-12-09

## 2021-12-09 RX ORDER — GEMFIBROZIL 600 MG/1
600 TABLET, FILM COATED ORAL 2 TIMES DAILY
Qty: 60 TABLET | Refills: 5 | Status: SHIPPED | OUTPATIENT
Start: 2021-12-09 | End: 2022-05-03

## 2021-12-09 NOTE — PROGRESS NOTES
Chief Complaint   Patient presents with    Physical     routine follow up     Anxiety     PRN medication      HPI:  Clayton Griffiths is a 62 y.o.  female with h/o depression and anxiety, hypercholesterolemia presents for annual physical exam.   Blood pressure is at goal. Patient has c/o increased anxiety due to loss of 3 family member including his dad and brother. Patient is asking for refill of medications. Review of Systems  As per hpi    Past Medical History:   Diagnosis Date    Fibromyalgia     Hypercholesterolemia     Hypertension     Kidney stones     passed 67 stones    Other ill-defined conditions(339.89)     kidney stones     Past Surgical History:   Procedure Laterality Date    HX OTHER SURGICAL      lung biopsy     Social History     Socioeconomic History    Marital status: SINGLE   Tobacco Use    Smoking status: Never Smoker    Smokeless tobacco: Never Used   Substance and Sexual Activity    Alcohol use: No    Drug use: No    Sexual activity: Never     No family history on file. Current Outpatient Medications   Medication Sig Dispense Refill    gemfibroziL (LOPID) 600 mg tablet TAKE 1 TABLET BY MOUTH TWICE A DAY 60 Tablet 0    cloNIDine HCL (CATAPRES) 0.1 mg tablet       gabapentin (NEURONTIN) 300 mg capsule       busPIRone (BUSPAR) 7.5 mg tablet       Premarin 0.625 mg tablet       methocarbamoL (ROBAXIN) 500 mg tablet Take 1 Tab by mouth three (3) times daily. 40 Tab 0    ascorbic acid, vitamin C, (VITAMIN C) 1,000 mg tablet Take 1 Tab by mouth daily. 14 Tab 0    duloxetine (CYMBALTA) 60 mg capsule Take 60 mg by mouth two (2) times a day.        Allergies   Allergen Reactions    Sulfa (Sulfonamide Antibiotics) Other (comments)     dizziness       Objective:  Visit Vitals  /67   Pulse 83   Temp 97.7 °F (36.5 °C)   Resp 20   Ht 5' 8\" (1.727 m)   Wt 240 lb (108.9 kg)   SpO2 100%   BMI 36.49 kg/m²     Physical Exam:   General appearance - alert, well appearing in no distress  Mental status - alert, oriented to person, place, and time  EYE-PERRL, EOMI  Neck - supple, no significant adenopathy   Chest - clear to auscultation, no wheezes, rales or rhonchi  Heart - normal rate, regular rhythm, no murmurs  Abdomen - soft, nontender, nondistended, no organomegaly  Ext-peripheral pulses normal, no pedal edema  Neuro - no focal findings   Back-full range of motion, no tenderness, palpable spasm or pain on motion     Results for orders placed or performed in visit on 60/74/42   METABOLIC PANEL, COMPREHENSIVE   Result Value Ref Range    Glucose 96 65 - 99 mg/dL    BUN 13 6 - 24 mg/dL    Creatinine 0.73 0.57 - 1.00 mg/dL    GFR est non-AA 91 >59 mL/min/1.73    GFR est  >59 mL/min/1.73    BUN/Creatinine ratio 18 9 - 23    Sodium 139 134 - 144 mmol/L    Potassium 3.2 (L) 3.5 - 5.2 mmol/L    Chloride 98 96 - 106 mmol/L    CO2 23 20 - 29 mmol/L    Calcium 8.9 8.7 - 10.2 mg/dL    Protein, total 6.8 6.0 - 8.5 g/dL    Albumin 4.3 3.8 - 4.9 g/dL    GLOBULIN, TOTAL 2.5 1.5 - 4.5 g/dL    A-G Ratio 1.7 1.2 - 2.2    Bilirubin, total 0.3 0.0 - 1.2 mg/dL    Alk.  phosphatase 67 39 - 117 IU/L    AST (SGOT) 63 (H) 0 - 40 IU/L    ALT (SGPT) 70 (H) 0 - 32 IU/L   CBC W/O DIFF   Result Value Ref Range    WBC 4.9 3.4 - 10.8 x10E3/uL    RBC 4.40 3.77 - 5.28 x10E6/uL    HGB 12.9 11.1 - 15.9 g/dL    HCT 38.4 34.0 - 46.6 %    MCV 87 79 - 97 fL    MCH 29.3 26.6 - 33.0 pg    MCHC 33.6 31.5 - 35.7 g/dL    RDW 13.7 11.7 - 15.4 %    PLATELET 082 140 - 099 x10E3/uL   LIPID PANEL   Result Value Ref Range    Cholesterol, total 216 (H) 100 - 199 mg/dL    Triglyceride 325 (H) 0 - 149 mg/dL    HDL Cholesterol 39 (L) >39 mg/dL    VLDL, calculated 57 (H) 5 - 40 mg/dL    LDL, calculated 120 (H) 0 - 99 mg/dL   HEMOGLOBIN A1C WITH EAG   Result Value Ref Range    Hemoglobin A1c 5.8 (H) 4.8 - 5.6 %    Estimated average glucose 120 mg/dL   TSH 3RD GENERATION   Result Value Ref Range    TSH 3.760 0.450 - 4.500 uIU/mL VITAMIN D, 25 HYDROXY   Result Value Ref Range    VITAMIN D, 25-HYDROXY 10.1 (L) 30.0 - 100.0 ng/mL   URINALYSIS W/ RFLX MICROSCOPIC   Result Value Ref Range    Specific Gravity 1.023 1.005 - 1.030    pH (UA) 6.0 5.0 - 7.5    Color Yellow Yellow    Appearance Clear Clear    Leukocyte Esterase Negative Negative    Protein Negative Negative/Trace    Glucose Negative Negative    Ketone Negative Negative    Blood Negative Negative    Bilirubin Negative Negative    Urobilinogen 0.2 0.2 - 1.0 mg/dL    Nitrites Negative Negative    Microscopic Examination Comment    HEPATITIS C AB   Result Value Ref Range    Hep C Virus Ab 0.3 0.0 - 0.9 s/co ratio     Assessment/Plan:  Diagnoses and all orders for this visit:    Encounter for annual physical exam  -     Cancel: METABOLIC PANEL, COMPREHENSIVE; Future  -     Cancel: CBC WITH AUTOMATED DIFF; Future    Mixed hypercholesterolemia and hypertriglyceridemia  -     gemfibroziL (LOPID) 600 mg tablet; Take 1 Tablet by mouth two (2) times a day., Normal, Disp-60 Tablet, R-5  -     Cancel: LIPID PANEL; Future    Needs flu shot  -     INFLUENZA VIRUS VAC QUAD,SPLIT,PRESV FREE SYRINGE IM    Encounter for immunization  -     varicella-zoster recombinant, PF, (Shingrix, PF,) 50 mcg/0.5 mL susr injection; 0.5 mL by IntraMUSCular route once for 1 dose., Print, Disp-0.5 mL, R-1    Obesity, morbid ([address]  -     Cancel: LIPID PANEL; Future    IGT (impaired glucose tolerance)  -     Cancel: HEMOGLOBIN A1C WITH EAG; Future    Elevated LFTs  -     Cancel: METABOLIC PANEL, COMPREHENSIVE; Future    Vitamin D deficiency  -     Cancel: VITAMIN D, 25 HYDROXY; Future  -     cholecalciferol (VITAMIN D3) (5000 Units /125 mcg) capsule; Take 1 Capsule by mouth daily. , Normal, Disp-30 Capsule, R-3    Dyslipidemia (high LDL; low HDL)    Frequency of urination  -     URINALYSIS W/ RFLX MICROSCOPIC; Future    Anxiety  -     busPIRone (BUSPAR) 15 mg tablet; Take 1 Tablet by mouth three (3) times daily. , Normal, Disp-30 Tablet, R-2  -     Cancel: METABOLIC PANEL, COMPREHENSIVE; Future  -     Cancel: CBC WITH AUTOMATED DIFF; Future    Screening for thyroid disorder  -     Cancel: TSH 3RD GENERATION; Future      Patient Instructions        Learning About Vitamin D  Why is it important to get enough vitamin D? Your body needs vitamin D to absorb calcium. Calcium keeps your bones and muscles, including your heart, healthy and strong. If your muscles don't get enough calcium, they can cramp, hurt, or feel weak. You may have long-term (chronic) muscle aches and pains. If you don't get enough vitamin D throughout life, you have an increased chance of having thin and brittle bones (osteoporosis) in your later years. Children who don't get enough vitamin D may not grow as much as others their age. They also have a chance of getting a rare disease called rickets. It causes weak bones. Vitamin D and calcium are added to many foods. And your body uses sunshine to make its own vitamin D. How much vitamin D do you need? The recommended daily allowance (RDA) for vitamin D is 600 IU (international units) every day for people ages 3 through 79. Adults 71 and older need 800 IU every day. Blood tests for vitamin D can check your vitamin D level. But there is no standard normal range used by all laboratories. You're likely getting enough vitamin D if your levels are in the range of 20 to 50 ng/mL. How can you get more vitamin D? Foods that contain vitamin D include:  · [place], tuna, and mackerel. These are some of the best foods to eat when you need to get more vitamin D.  · Cheese, egg yolks, and beef liver. These foods have vitamin D in small amounts. · Milk, soy drinks, orange juice, yogurt, margarine, and some kinds of cereal have vitamin D added to them. Some people don't make vitamin D as well as others. They may have to take extra care in getting enough vitamin D.   Things that reduce how much vitamin D your body makes include:  · Dark skin, such as many  Americans have. · Age, especially if you are older than 72. · Digestive problems, such as Crohn's or celiac disease. · Liver and kidney disease. Some people who do not get enough vitamin D may need supplements. Are there any risks from taking vitamin D?  · Too much vitamin D:  ? Can damage your kidneys. ? Can cause nausea and vomiting, constipation, and weakness. ? Raises the amount of calcium in your blood. If this happens, you can get confused or have an irregular heart rhythm. · Vitamin D may interact with other medicines. Tell your doctor about all of the medicines you take, including over-the-counter drugs, herbs, and pills. Tell your doctor about all of your current medical problems. Where can you learn more? Go to http://www.chaudhry.com/  Enter D730 in the search box to learn more about \"Learning About Vitamin D.\"  Current as of: December 17, 2020               Content Version: 13.0  © 2006-2021 Healthwise, [facility]. Care instructions adapted under license by [employer] (which disclaims liability or warranty for this information). If you have questions about a medical condition or this instruction, always ask your healthcare professional. [place] [address] any warranty or liability for your use of this information. Follow-up and Dispositions    · Return in about 3 months (around 3/9/2022) for routine follow up.

## 2021-12-09 NOTE — PATIENT INSTRUCTIONS
Learning About Vitamin D  Why is it important to get enough vitamin D? Your body needs vitamin D to absorb calcium. Calcium keeps your bones and muscles, including your heart, healthy and strong. If your muscles don't get enough calcium, they can cramp, hurt, or feel weak. You may have long-term (chronic) muscle aches and pains. If you don't get enough vitamin D throughout life, you have an increased chance of having thin and brittle bones (osteoporosis) in your later years. Children who don't get enough vitamin D may not grow as much as others their age. They also have a chance of getting a rare disease called rickets. It causes weak bones. Vitamin D and calcium are added to many foods. And your body uses sunshine to make its own vitamin D. How much vitamin D do you need? The recommended daily allowance (RDA) for vitamin D is 600 IU (international units) every day for people ages 3 through 79. Adults 71 and older need 800 IU every day. Blood tests for vitamin D can check your vitamin D level. But there is no standard normal range used by all laboratories. You're likely getting enough vitamin D if your levels are in the range of 20 to 50 ng/mL. How can you get more vitamin D? Foods that contain vitamin D include:  · [place], tuna, and mackerel. These are some of the best foods to eat when you need to get more vitamin D.  · Cheese, egg yolks, and beef liver. These foods have vitamin D in small amounts. · Milk, soy drinks, orange juice, yogurt, margarine, and some kinds of cereal have vitamin D added to them. Some people don't make vitamin D as well as others. They may have to take extra care in getting enough vitamin D. Things that reduce how much vitamin D your body makes include:  · Dark skin, such as many  Americans have. · Age, especially if you are older than 72. · Digestive problems, such as Crohn's or celiac disease. · Liver and kidney disease.   Some people who do not get enough vitamin D may need supplements. Are there any risks from taking vitamin D?  · Too much vitamin D:  ? Can damage your kidneys. ? Can cause nausea and vomiting, constipation, and weakness. ? Raises the amount of calcium in your blood. If this happens, you can get confused or have an irregular heart rhythm. · Vitamin D may interact with other medicines. Tell your doctor about all of the medicines you take, including over-the-counter drugs, herbs, and pills. Tell your doctor about all of your current medical problems. Where can you learn more? Go to http://www.chaudhry.com/  Enter V530 in the search box to learn more about \"Learning About Vitamin D.\"  Current as of: December 17, 2020               Content Version: 13.0  © 2006-2021 Healthwise, Incorporated. Care instructions adapted under license by [employer] (which disclaims liability or warranty for this information). If you have questions about a medical condition or this instruction, always ask your healthcare professional. [place] [address] any warranty or liability for your use of this information.

## 2021-12-11 LAB
25(OH)D3 SERPL-MCNC: 39 NG/ML (ref 30–100)
ALBUMIN SERPL-MCNC: 3.3 G/DL (ref 3.5–5)
ALBUMIN/GLOB SERPL: 0.8 {RATIO} (ref 1.1–2.2)
ALP SERPL-CCNC: 78 U/L (ref 45–117)
ALT SERPL-CCNC: 30 U/L (ref 12–78)
ANION GAP SERPL CALC-SCNC: 8 MMOL/L (ref 5–15)
AST SERPL-CCNC: 16 U/L (ref 15–37)
BASOPHILS # BLD: 0 K/UL (ref 0–0.1)
BASOPHILS NFR BLD: 1 % (ref 0–1)
BILIRUB SERPL-MCNC: 0.2 MG/DL (ref 0.2–1)
BUN SERPL-MCNC: 13 MG/DL (ref 6–20)
BUN/CREAT SERPL: 17 (ref 12–20)
CALCIUM SERPL-MCNC: 9.3 MG/DL (ref 8.5–10.1)
CHLORIDE SERPL-SCNC: 106 MMOL/L (ref 97–108)
CHOLEST SERPL-MCNC: 223 MG/DL
CO2 SERPL-SCNC: 24 MMOL/L (ref 21–32)
CREAT SERPL-MCNC: 0.76 MG/DL (ref 0.55–1.02)
DIFFERENTIAL METHOD BLD: ABNORMAL
EOSINOPHIL # BLD: 0.1 K/UL (ref 0–0.4)
EOSINOPHIL NFR BLD: 2 % (ref 0–7)
ERYTHROCYTE [DISTWIDTH] IN BLOOD BY AUTOMATED COUNT: 12.8 % (ref 11.5–14.5)
EST. AVERAGE GLUCOSE BLD GHB EST-MCNC: 111 MG/DL
GLOBULIN SER CALC-MCNC: 4.3 G/DL (ref 2–4)
GLUCOSE SERPL-MCNC: 96 MG/DL (ref 65–100)
HBA1C MFR BLD: 5.5 % (ref 4–5.6)
HCT VFR BLD AUTO: 39.8 % (ref 35–47)
HDLC SERPL-MCNC: 34 MG/DL
HDLC SERPL: 6.6 {RATIO} (ref 0–5)
HGB BLD-MCNC: 12.4 G/DL (ref 11.5–16)
IMM GRANULOCYTES # BLD AUTO: 0 K/UL (ref 0–0.04)
IMM GRANULOCYTES NFR BLD AUTO: 1 % (ref 0–0.5)
LDLC SERPL CALC-MCNC: 133 MG/DL (ref 0–100)
LYMPHOCYTES # BLD: 1.4 K/UL (ref 0.8–3.5)
LYMPHOCYTES NFR BLD: 24 % (ref 12–49)
MCH RBC QN AUTO: 28.7 PG (ref 26–34)
MCHC RBC AUTO-ENTMCNC: 31.2 G/DL (ref 30–36.5)
MCV RBC AUTO: 92.1 FL (ref 80–99)
MONOCYTES # BLD: 0.6 K/UL (ref 0–1)
MONOCYTES NFR BLD: 10 % (ref 5–13)
NEUTS SEG # BLD: 3.9 K/UL (ref 1.8–8)
NEUTS SEG NFR BLD: 62 % (ref 32–75)
NRBC # BLD: 0 K/UL (ref 0–0.01)
NRBC BLD-RTO: 0 PER 100 WBC
PLATELET # BLD AUTO: 277 K/UL (ref 150–400)
PMV BLD AUTO: 10 FL (ref 8.9–12.9)
POTASSIUM SERPL-SCNC: 3.6 MMOL/L (ref 3.5–5.1)
PROT SERPL-MCNC: 7.6 G/DL (ref 6.4–8.2)
RBC # BLD AUTO: 4.32 M/UL (ref 3.8–5.2)
SODIUM SERPL-SCNC: 138 MMOL/L (ref 136–145)
TRIGL SERPL-MCNC: 280 MG/DL (ref ?–150)
TSH SERPL DL<=0.05 MIU/L-ACNC: 2.09 UIU/ML (ref 0.36–3.74)
VLDLC SERPL CALC-MCNC: 56 MG/DL
WBC # BLD AUTO: 6.1 K/UL (ref 3.6–11)

## 2021-12-23 ENCOUNTER — TELEPHONE (OUTPATIENT)
Dept: FAMILY MEDICINE CLINIC | Age: 61
End: 2021-12-23

## 2021-12-23 DIAGNOSIS — N30.01 ACUTE CYSTITIS WITH HEMATURIA: Primary | ICD-10-CM

## 2021-12-23 RX ORDER — CIPROFLOXACIN 500 MG/1
500 TABLET ORAL 2 TIMES DAILY
Qty: 14 TABLET | Refills: 0 | Status: SHIPPED | OUTPATIENT
Start: 2021-12-23 | End: 2021-12-30

## 2021-12-23 NOTE — TELEPHONE ENCOUNTER
----- Message from Ewelina Bernard sent at 12/21/2021 12:13 PM EST -----  Subject: Message to Provider    QUESTIONS  Information for Provider? Patient called today for test results. Can   someone please call the patient back asap with the results that she is   looking for?   ---------------------------------------------------------------------------  --------------  [address]  What is the best way for the office to contact you? OK to leave message on   voicemail  Preferred Call Back Phone Number? [phone]  ---------------------------------------------------------------------------  --------------  SCRIPT ANSWERS  Relationship to Patient?  Self

## 2022-01-04 ENCOUNTER — TELEPHONE (OUTPATIENT)
Dept: FAMILY MEDICINE CLINIC | Age: 62
End: 2022-01-04

## 2022-01-04 NOTE — TELEPHONE ENCOUNTER
Pharmacy's are on backordered until February of the CIPROFLOXACIN HCL need an alternative medication

## 2022-01-05 DIAGNOSIS — N30.01 ACUTE CYSTITIS WITH HEMATURIA: Primary | ICD-10-CM

## 2022-01-05 RX ORDER — NITROFURANTOIN 25; 75 MG/1; MG/1
100 CAPSULE ORAL 2 TIMES DAILY
Qty: 14 CAPSULE | Refills: 0 | Status: SHIPPED | OUTPATIENT
Start: 2022-01-05 | End: 2022-01-12

## 2022-03-18 PROBLEM — U07.1 PNEUMONIA DUE TO COVID-19 VIRUS: Status: ACTIVE | Noted: 2020-10-23

## 2022-03-18 PROBLEM — J12.82 PNEUMONIA DUE TO COVID-19 VIRUS: Status: ACTIVE | Noted: 2020-10-23

## 2022-03-19 PROBLEM — K11.7 XEROSTOMIA: Status: ACTIVE | Noted: 2020-05-10

## 2022-03-19 PROBLEM — R79.89 ELEVATED LFTS: Status: ACTIVE | Noted: 2020-10-23

## 2022-03-19 PROBLEM — F41.9 ANXIETY: Status: ACTIVE | Noted: 2020-10-29

## 2022-03-19 PROBLEM — J96.01 ACUTE RESPIRATORY FAILURE WITH HYPOXIA: Status: ACTIVE | Noted: 2020-10-23

## 2022-03-19 PROBLEM — G47.33 OSA ON CPAP: Status: ACTIVE | Noted: 2020-10-23

## 2022-03-19 PROBLEM — E66.01 OBESITY, MORBID: Status: ACTIVE | Noted: 2020-12-11

## 2022-03-20 PROBLEM — E87.6 HYPOKALEMIA: Status: ACTIVE | Noted: 2020-10-23

## 2022-04-03 DIAGNOSIS — E55.9 VITAMIN D DEFICIENCY: ICD-10-CM

## 2022-04-03 RX ORDER — CHOLECALCIFEROL (VITAMIN D3) 125 MCG
CAPSULE ORAL
Qty: 30 CAPSULE | Refills: 3 | Status: SHIPPED | OUTPATIENT
Start: 2022-04-03

## 2022-06-03 DIAGNOSIS — E78.2 MIXED HYPERCHOLESTEROLEMIA AND HYPERTRIGLYCERIDEMIA: ICD-10-CM

## 2022-06-03 RX ORDER — GEMFIBROZIL 600 MG/1
TABLET, FILM COATED ORAL
Qty: 60 TABLET | Refills: 0 | Status: SHIPPED | OUTPATIENT
Start: 2022-06-03 | End: 2022-07-05

## 2022-06-25 DIAGNOSIS — E78.2 MIXED HYPERCHOLESTEROLEMIA AND HYPERTRIGLYCERIDEMIA: ICD-10-CM

## 2022-07-05 RX ORDER — GEMFIBROZIL 600 MG/1
TABLET, FILM COATED ORAL
Qty: 60 TABLET | Refills: 0 | Status: SHIPPED | OUTPATIENT
Start: 2022-07-05 | End: 2022-07-28

## 2022-07-28 DIAGNOSIS — E78.2 MIXED HYPERCHOLESTEROLEMIA AND HYPERTRIGLYCERIDEMIA: ICD-10-CM

## 2022-07-28 RX ORDER — GEMFIBROZIL 600 MG/1
TABLET, FILM COATED ORAL
Qty: 60 TABLET | Refills: 0 | Status: SHIPPED | OUTPATIENT
Start: 2022-07-28 | End: 2022-08-22

## 2022-08-02 NOTE — PROGRESS NOTES
Pt transferred to room 3237 in stable condition. Transported via stretcher, w/ NC O2 4L/min. SBAR report provided to [employer]. Josué and MAR reviewed. 15

## 2022-08-21 DIAGNOSIS — E78.2 MIXED HYPERCHOLESTEROLEMIA AND HYPERTRIGLYCERIDEMIA: ICD-10-CM

## 2022-08-22 ENCOUNTER — TELEPHONE (OUTPATIENT)
Dept: FAMILY MEDICINE CLINIC | Age: 62
End: 2022-08-22

## 2022-08-22 RX ORDER — GEMFIBROZIL 600 MG/1
TABLET, FILM COATED ORAL
Qty: 60 TABLET | Refills: 0 | Status: SHIPPED | OUTPATIENT
Start: 2022-08-22 | End: 2022-09-16

## 2022-09-16 DIAGNOSIS — E78.2 MIXED HYPERCHOLESTEROLEMIA AND HYPERTRIGLYCERIDEMIA: ICD-10-CM

## 2022-09-16 RX ORDER — GEMFIBROZIL 600 MG/1
TABLET, FILM COATED ORAL
Qty: 60 TABLET | Refills: 0 | Status: SHIPPED | OUTPATIENT
Start: 2022-09-16 | End: 2022-10-14

## 2022-09-21 ENCOUNTER — TRANSCRIBE ORDER (OUTPATIENT)
Dept: SCHEDULING | Age: 62
End: 2022-09-21

## 2022-09-21 DIAGNOSIS — M54.16 LUMBAR RADICULITIS: Primary | ICD-10-CM

## 2022-09-29 ENCOUNTER — HOSPITAL ENCOUNTER (OUTPATIENT)
Dept: MRI IMAGING | Age: 62
Discharge: HOME | End: 2022-09-29
Attending: SPECIALIST
Payer: COMMERCIAL

## 2022-09-29 DIAGNOSIS — M54.16 LUMBAR RADICULITIS: ICD-10-CM

## 2022-09-29 PROCEDURE — 72148 MRI LUMBAR SPINE W/O DYE: CPT

## 2022-10-13 DIAGNOSIS — E78.2 MIXED HYPERCHOLESTEROLEMIA AND HYPERTRIGLYCERIDEMIA: ICD-10-CM

## 2022-10-14 RX ORDER — GEMFIBROZIL 600 MG/1
TABLET, FILM COATED ORAL
Qty: 60 TABLET | Refills: 0 | Status: SHIPPED | OUTPATIENT
Start: 2022-10-14

## 2022-11-06 DIAGNOSIS — E78.2 MIXED HYPERCHOLESTEROLEMIA AND HYPERTRIGLYCERIDEMIA: ICD-10-CM

## 2022-11-06 NOTE — LETTER
11/9/2022     [address]  [address] [address]      Dear Mike Tom    My records indicate that you are overdue for a follow up appointment. It is important to maintain your appointments so that I can monitor your health. Please call our office at [phone] to schedule an appointment.       Sincerely,  [facility] Not On File, [address]  [address] [facility] [id number] [facility]  [phone]

## 2022-11-07 ENCOUNTER — TELEPHONE (OUTPATIENT)
Dept: FAMILY MEDICINE CLINIC | Age: 62
End: 2022-11-07

## 2022-11-07 RX ORDER — GEMFIBROZIL 600 MG/1
TABLET, FILM COATED ORAL
Qty: 60 TABLET | Refills: 0 | OUTPATIENT
Start: 2022-11-07

## 2024-12-01 ENCOUNTER — APPOINTMENT (OUTPATIENT)
Facility: HOSPITAL | Age: 64
End: 2024-12-01
Payer: COMMERCIAL

## 2024-12-01 ENCOUNTER — HOSPITAL ENCOUNTER (OUTPATIENT)
Facility: HOSPITAL | Age: 64
Setting detail: OBSERVATION
Discharge: HOME | End: 2024-12-02
Attending: EMERGENCY MEDICINE | Admitting: INTERNAL MEDICINE
Payer: COMMERCIAL

## 2024-12-01 DIAGNOSIS — G45.9 TIA (TRANSIENT ISCHEMIC ATTACK): Primary | ICD-10-CM

## 2024-12-01 LAB
ALBUMIN SERPL-MCNC: 3.2 G/DL (ref 3.5–5)
ALBUMIN/GLOB SERPL: 1 (ref 1.1–2.2)
ALP SERPL-CCNC: 57 U/L (ref 45–117)
ALT SERPL-CCNC: 23 U/L (ref 12–78)
ANION GAP SERPL CALC-SCNC: 5 MMOL/L (ref 2–12)
APPEARANCE UR: CLEAR
AST SERPL-CCNC: 15 U/L (ref 15–37)
BACTERIA URNS QL MICRO: NEGATIVE /HPF
BASOPHILS # BLD: 0 K/UL (ref 0–0.1)
BASOPHILS NFR BLD: 1 % (ref 0–1)
BILIRUB SERPL-MCNC: 0.2 MG/DL (ref 0.2–1)
BILIRUB UR QL: NEGATIVE
BUN SERPL-MCNC: 19 MG/DL (ref 6–20)
BUN/CREAT SERPL: 33 (ref 12–20)
CALCIUM SERPL-MCNC: 8.8 MG/DL (ref 8.5–10.1)
CHLORIDE SERPL-SCNC: 111 MMOL/L (ref 97–108)
CO2 SERPL-SCNC: 25 MMOL/L (ref 21–32)
COLOR UR: ABNORMAL
CREAT SERPL-MCNC: 0.57 MG/DL (ref 0.55–1.02)
DIFFERENTIAL METHOD BLD: NORMAL
EOSINOPHIL # BLD: 0.1 K/UL (ref 0–0.4)
EOSINOPHIL NFR BLD: 3 % (ref 0–7)
EPITH CASTS URNS QL MICRO: ABNORMAL /LPF
ERYTHROCYTE [DISTWIDTH] IN BLOOD BY AUTOMATED COUNT: 13.3 % (ref 11.5–14.5)
GLOBULIN SER CALC-MCNC: 3.1 G/DL (ref 2–4)
GLUCOSE BLD STRIP.AUTO-MCNC: 97 MG/DL (ref 65–117)
GLUCOSE SERPL-MCNC: 96 MG/DL (ref 65–100)
GLUCOSE UR STRIP.AUTO-MCNC: NEGATIVE MG/DL
HCT VFR BLD AUTO: 37.4 % (ref 35–47)
HGB BLD-MCNC: 12.3 G/DL (ref 11.5–16)
HGB UR QL STRIP: NEGATIVE
HYALINE CASTS URNS QL MICRO: ABNORMAL /LPF (ref 0–2)
IMM GRANULOCYTES # BLD AUTO: 0 K/UL (ref 0–0.04)
IMM GRANULOCYTES NFR BLD AUTO: 0 % (ref 0–0.5)
KETONES UR QL STRIP.AUTO: NEGATIVE MG/DL
LEUKOCYTE ESTERASE UR QL STRIP.AUTO: NEGATIVE
LYMPHOCYTES # BLD: 1.3 K/UL (ref 0.8–3.5)
LYMPHOCYTES NFR BLD: 28 % (ref 12–49)
MAGNESIUM SERPL-MCNC: 2 MG/DL (ref 1.6–2.4)
MCH RBC QN AUTO: 29.3 PG (ref 26–34)
MCHC RBC AUTO-ENTMCNC: 32.9 G/DL (ref 30–36.5)
MCV RBC AUTO: 89 FL (ref 80–99)
MONOCYTES # BLD: 0.5 K/UL (ref 0–1)
MONOCYTES NFR BLD: 10 % (ref 5–13)
NEUTS SEG # BLD: 2.8 K/UL (ref 1.8–8)
NEUTS SEG NFR BLD: 58 % (ref 32–75)
NITRITE UR QL STRIP.AUTO: NEGATIVE
NRBC # BLD: 0 K/UL (ref 0–0.01)
NRBC BLD-RTO: 0 PER 100 WBC
PH UR STRIP: 5.5 (ref 5–8)
PLATELET # BLD AUTO: 196 K/UL (ref 150–400)
PMV BLD AUTO: 10.1 FL (ref 8.9–12.9)
POTASSIUM SERPL-SCNC: 3.7 MMOL/L (ref 3.5–5.1)
PROT SERPL-MCNC: 6.3 G/DL (ref 6.4–8.2)
PROT UR STRIP-MCNC: NEGATIVE MG/DL
RBC # BLD AUTO: 4.2 M/UL (ref 3.8–5.2)
RBC #/AREA URNS HPF: ABNORMAL /HPF (ref 0–5)
SERVICE CMNT-IMP: NORMAL
SODIUM SERPL-SCNC: 141 MMOL/L (ref 136–145)
SP GR UR REFRACTOMETRY: 1.01
TROPONIN I SERPL HS-MCNC: 7 NG/L (ref 0–51)
TSH SERPL DL<=0.05 MIU/L-ACNC: 3.21 UIU/ML (ref 0.36–3.74)
URINE CULTURE IF INDICATED: ABNORMAL
UROBILINOGEN UR QL STRIP.AUTO: 0.2 EU/DL (ref 0.2–1)
WBC # BLD AUTO: 4.7 K/UL (ref 3.6–11)
WBC URNS QL MICRO: ABNORMAL /HPF (ref 0–4)

## 2024-12-01 PROCEDURE — 82962 GLUCOSE BLOOD TEST: CPT

## 2024-12-01 PROCEDURE — 36415 COLL VENOUS BLD VENIPUNCTURE: CPT

## 2024-12-01 PROCEDURE — 85025 COMPLETE CBC W/AUTO DIFF WBC: CPT

## 2024-12-01 PROCEDURE — 6370000000 HC RX 637 (ALT 250 FOR IP): Performed by: INTERNAL MEDICINE

## 2024-12-01 PROCEDURE — 70450 CT HEAD/BRAIN W/O DYE: CPT

## 2024-12-01 PROCEDURE — 84443 ASSAY THYROID STIM HORMONE: CPT

## 2024-12-01 PROCEDURE — 93005 ELECTROCARDIOGRAM TRACING: CPT | Performed by: INTERNAL MEDICINE

## 2024-12-01 PROCEDURE — 6360000002 HC RX W HCPCS: Performed by: INTERNAL MEDICINE

## 2024-12-01 PROCEDURE — 96374 THER/PROPH/DIAG INJ IV PUSH: CPT

## 2024-12-01 PROCEDURE — 2580000003 HC RX 258: Performed by: INTERNAL MEDICINE

## 2024-12-01 PROCEDURE — G0378 HOSPITAL OBSERVATION PER HR: HCPCS

## 2024-12-01 PROCEDURE — 70496 CT ANGIOGRAPHY HEAD: CPT

## 2024-12-01 PROCEDURE — 81001 URINALYSIS AUTO W/SCOPE: CPT

## 2024-12-01 PROCEDURE — 70551 MRI BRAIN STEM W/O DYE: CPT

## 2024-12-01 PROCEDURE — 80053 COMPREHEN METABOLIC PANEL: CPT

## 2024-12-01 PROCEDURE — 84484 ASSAY OF TROPONIN QUANT: CPT

## 2024-12-01 PROCEDURE — 96372 THER/PROPH/DIAG INJ SC/IM: CPT

## 2024-12-01 PROCEDURE — 83735 ASSAY OF MAGNESIUM: CPT

## 2024-12-01 PROCEDURE — 6360000004 HC RX CONTRAST MEDICATION: Performed by: INTERNAL MEDICINE

## 2024-12-01 PROCEDURE — 99285 EMERGENCY DEPT VISIT HI MDM: CPT

## 2024-12-01 RX ORDER — TRAMADOL HYDROCHLORIDE 50 MG/1
50 TABLET ORAL EVERY 8 HOURS PRN
COMMUNITY

## 2024-12-01 RX ORDER — LORAZEPAM 2 MG/ML
1 INJECTION INTRAMUSCULAR ONCE
Status: COMPLETED | OUTPATIENT
Start: 2024-12-01 | End: 2024-12-01

## 2024-12-01 RX ORDER — TRAMADOL HYDROCHLORIDE 50 MG/1
50 TABLET ORAL 3 TIMES DAILY PRN
Status: DISCONTINUED | OUTPATIENT
Start: 2024-12-01 | End: 2024-12-02 | Stop reason: HOSPADM

## 2024-12-01 RX ORDER — ZOLPIDEM TARTRATE 10 MG/1
10 TABLET ORAL NIGHTLY PRN
COMMUNITY

## 2024-12-01 RX ORDER — ENOXAPARIN SODIUM 100 MG/ML
30 INJECTION SUBCUTANEOUS 2 TIMES DAILY
Status: DISCONTINUED | OUTPATIENT
Start: 2024-12-01 | End: 2024-12-02 | Stop reason: HOSPADM

## 2024-12-01 RX ORDER — PROGESTERONE 100 MG/1
100 CAPSULE ORAL DAILY
COMMUNITY

## 2024-12-01 RX ORDER — SODIUM CHLORIDE 9 MG/ML
INJECTION, SOLUTION INTRAVENOUS PRN
Status: DISCONTINUED | OUTPATIENT
Start: 2024-12-01 | End: 2024-12-02 | Stop reason: HOSPADM

## 2024-12-01 RX ORDER — ASCORBIC ACID 500 MG
1000 TABLET ORAL DAILY
Status: DISCONTINUED | OUTPATIENT
Start: 2024-12-01 | End: 2024-12-02 | Stop reason: HOSPADM

## 2024-12-01 RX ORDER — IOPAMIDOL 755 MG/ML
100 INJECTION, SOLUTION INTRAVASCULAR
Status: COMPLETED | OUTPATIENT
Start: 2024-12-01 | End: 2024-12-01

## 2024-12-01 RX ORDER — GABAPENTIN 600 MG/1
300 TABLET ORAL DAILY
Status: ON HOLD | COMMUNITY
End: 2024-12-05

## 2024-12-01 RX ORDER — SODIUM CHLORIDE 0.9 % (FLUSH) 0.9 %
5-40 SYRINGE (ML) INJECTION EVERY 12 HOURS SCHEDULED
Status: DISCONTINUED | OUTPATIENT
Start: 2024-12-01 | End: 2024-12-02 | Stop reason: HOSPADM

## 2024-12-01 RX ORDER — GEMFIBROZIL 600 MG/1
600 TABLET, FILM COATED ORAL 2 TIMES DAILY
Status: DISCONTINUED | OUTPATIENT
Start: 2024-12-01 | End: 2024-12-02

## 2024-12-01 RX ORDER — DULOXETIN HYDROCHLORIDE 60 MG/1
60 CAPSULE, DELAYED RELEASE ORAL DAILY
Status: ON HOLD | COMMUNITY
End: 2024-12-02 | Stop reason: HOSPADM

## 2024-12-01 RX ORDER — GABAPENTIN 100 MG/1
100 CAPSULE ORAL NIGHTLY
Status: DISCONTINUED | OUTPATIENT
Start: 2024-12-01 | End: 2024-12-02 | Stop reason: HOSPADM

## 2024-12-01 RX ORDER — ONDANSETRON 4 MG/1
4 TABLET, FILM COATED ORAL EVERY 12 HOURS PRN
COMMUNITY

## 2024-12-01 RX ORDER — CHLORZOXAZONE 500 MG/1
500 TABLET ORAL 2 TIMES DAILY
Status: ON HOLD | COMMUNITY
End: 2024-12-02 | Stop reason: ALTCHOICE

## 2024-12-01 RX ORDER — ASPIRIN 300 MG/1
300 SUPPOSITORY RECTAL DAILY
Status: DISCONTINUED | OUTPATIENT
Start: 2024-12-02 | End: 2024-12-02 | Stop reason: HOSPADM

## 2024-12-01 RX ORDER — POTASSIUM CHLORIDE 750 MG/1
30 TABLET, EXTENDED RELEASE ORAL DAILY PRN
COMMUNITY

## 2024-12-01 RX ORDER — DULOXETIN HYDROCHLORIDE 20 MG/1
20 CAPSULE, DELAYED RELEASE ORAL 2 TIMES DAILY
Status: DISCONTINUED | OUTPATIENT
Start: 2024-12-01 | End: 2024-12-02 | Stop reason: HOSPADM

## 2024-12-01 RX ORDER — FUROSEMIDE 20 MG/1
20 TABLET ORAL DAILY PRN
COMMUNITY

## 2024-12-01 RX ORDER — METHOCARBAMOL 500 MG/1
500 TABLET, FILM COATED ORAL 3 TIMES DAILY
Status: DISCONTINUED | OUTPATIENT
Start: 2024-12-01 | End: 2024-12-02 | Stop reason: HOSPADM

## 2024-12-01 RX ORDER — ONDANSETRON 4 MG/1
4 TABLET, ORALLY DISINTEGRATING ORAL EVERY 8 HOURS PRN
Status: DISCONTINUED | OUTPATIENT
Start: 2024-12-01 | End: 2024-12-02 | Stop reason: HOSPADM

## 2024-12-01 RX ORDER — ONDANSETRON 2 MG/ML
4 INJECTION INTRAMUSCULAR; INTRAVENOUS EVERY 6 HOURS PRN
Status: DISCONTINUED | OUTPATIENT
Start: 2024-12-01 | End: 2024-12-02 | Stop reason: HOSPADM

## 2024-12-01 RX ORDER — CLONIDINE HYDROCHLORIDE 0.1 MG/1
0.1 TABLET ORAL
COMMUNITY

## 2024-12-01 RX ORDER — GABAPENTIN 600 MG/1
1200 TABLET ORAL
Status: ON HOLD | COMMUNITY
End: 2024-12-02 | Stop reason: HOSPADM

## 2024-12-01 RX ORDER — OMEPRAZOLE 10 MG/1
10 CAPSULE, DELAYED RELEASE ORAL DAILY PRN
COMMUNITY

## 2024-12-01 RX ORDER — BUSPIRONE HYDROCHLORIDE 30 MG/1
15 TABLET ORAL 2 TIMES DAILY
Status: ON HOLD | COMMUNITY
End: 2024-12-02 | Stop reason: HOSPADM

## 2024-12-01 RX ORDER — SODIUM CHLORIDE 0.9 % (FLUSH) 0.9 %
5-40 SYRINGE (ML) INJECTION PRN
Status: DISCONTINUED | OUTPATIENT
Start: 2024-12-01 | End: 2024-12-02 | Stop reason: HOSPADM

## 2024-12-01 RX ORDER — TRIAMTERENE AND HYDROCHLOROTHIAZIDE 37.5; 25 MG/1; MG/1
1 TABLET ORAL DAILY
COMMUNITY

## 2024-12-01 RX ORDER — POLYETHYLENE GLYCOL 3350 17 G/17G
17 POWDER, FOR SOLUTION ORAL DAILY PRN
Status: DISCONTINUED | OUTPATIENT
Start: 2024-12-01 | End: 2024-12-02 | Stop reason: HOSPADM

## 2024-12-01 RX ORDER — ASPIRIN 81 MG/1
81 TABLET, CHEWABLE ORAL DAILY
Status: DISCONTINUED | OUTPATIENT
Start: 2024-12-02 | End: 2024-12-02 | Stop reason: HOSPADM

## 2024-12-01 RX ORDER — METHOCARBAMOL 750 MG/1
750 TABLET, FILM COATED ORAL DAILY PRN
Status: ON HOLD | COMMUNITY
End: 2024-12-02 | Stop reason: HOSPADM

## 2024-12-01 RX ORDER — TOPIRAMATE 50 MG/1
50 TABLET, FILM COATED ORAL 2 TIMES DAILY
COMMUNITY

## 2024-12-01 RX ORDER — BUSPIRONE HYDROCHLORIDE 5 MG/1
5 TABLET ORAL 3 TIMES DAILY
Status: DISCONTINUED | OUTPATIENT
Start: 2024-12-01 | End: 2024-12-02 | Stop reason: HOSPADM

## 2024-12-01 RX ORDER — MELOXICAM 15 MG/1
15 TABLET ORAL DAILY PRN
COMMUNITY

## 2024-12-01 RX ORDER — METHOCARBAMOL 750 MG/1
750 TABLET, FILM COATED ORAL 4 TIMES DAILY
Status: ON HOLD | COMMUNITY
End: 2024-12-02 | Stop reason: HOSPADM

## 2024-12-01 RX ADMIN — BUSPIRONE HYDROCHLORIDE 5 MG: 5 TABLET ORAL at 20:45

## 2024-12-01 RX ADMIN — SODIUM CHLORIDE, PRESERVATIVE FREE 10 ML: 5 INJECTION INTRAVENOUS at 20:41

## 2024-12-01 RX ADMIN — OXYCODONE HYDROCHLORIDE AND ACETAMINOPHEN 1000 MG: 500 TABLET ORAL at 20:47

## 2024-12-01 RX ADMIN — ENOXAPARIN SODIUM 30 MG: 100 INJECTION SUBCUTANEOUS at 20:49

## 2024-12-01 RX ADMIN — TRAMADOL HYDROCHLORIDE 50 MG: 50 TABLET, COATED ORAL at 17:41

## 2024-12-01 RX ADMIN — ENOXAPARIN SODIUM 30 MG: 100 INJECTION SUBCUTANEOUS at 17:30

## 2024-12-01 RX ADMIN — DULOXETINE HYDROCHLORIDE 20 MG: 20 CAPSULE, DELAYED RELEASE ORAL at 20:44

## 2024-12-01 RX ADMIN — GABAPENTIN 100 MG: 100 CAPSULE ORAL at 20:44

## 2024-12-01 RX ADMIN — LORAZEPAM 1 MG: 2 INJECTION INTRAMUSCULAR; INTRAVENOUS at 16:20

## 2024-12-01 RX ADMIN — METHOCARBAMOL TABLETS 500 MG: 500 TABLET, COATED ORAL at 20:44

## 2024-12-01 RX ADMIN — GEMFIBROZIL 600 MG: 600 TABLET ORAL at 20:45

## 2024-12-01 RX ADMIN — IOPAMIDOL 100 ML: 755 INJECTION, SOLUTION INTRAVENOUS at 13:51

## 2024-12-01 ASSESSMENT — PAIN SCALES - GENERAL
PAINLEVEL_OUTOF10: 8
PAINLEVEL_OUTOF10: 4
PAINLEVEL_OUTOF10: 7
PAINLEVEL_OUTOF10: 6

## 2024-12-01 ASSESSMENT — LIFESTYLE VARIABLES
HOW OFTEN DO YOU HAVE A DRINK CONTAINING ALCOHOL: NEVER
HOW MANY STANDARD DRINKS CONTAINING ALCOHOL DO YOU HAVE ON A TYPICAL DAY: PATIENT DOES NOT DRINK

## 2024-12-01 ASSESSMENT — PAIN DESCRIPTION - LOCATION
LOCATION: BACK

## 2024-12-01 ASSESSMENT — PAIN DESCRIPTION - ORIENTATION: ORIENTATION: LOWER

## 2024-12-01 ASSESSMENT — PAIN - FUNCTIONAL ASSESSMENT: PAIN_FUNCTIONAL_ASSESSMENT: PREVENTS OR INTERFERES SOME ACTIVE ACTIVITIES AND ADLS

## 2024-12-01 ASSESSMENT — PAIN DESCRIPTION - ONSET: ONSET: ON-GOING

## 2024-12-01 ASSESSMENT — PAIN DESCRIPTION - DESCRIPTORS: DESCRIPTORS: ACHING

## 2024-12-01 ASSESSMENT — PAIN DESCRIPTION - PAIN TYPE: TYPE: CHRONIC PAIN

## 2024-12-01 ASSESSMENT — PAIN DESCRIPTION - FREQUENCY: FREQUENCY: INTERMITTENT

## 2024-12-01 NOTE — ED PROVIDER NOTES
[facility]: Outside name: cloNIDine HCL (CATAPRES) 0.1 mg tablet    DULOXETINE (CYMBALTA) 60 MG EXTENDED RELEASE CAPSULE    Take by mouth 2 times daily    ESTROGENS, CONJUGATED, (PREMARIN) 0.625 MG TABLET    ceived the following from [facility]: Outside name: Premarin 0.625 mg tablet    GABAPENTIN (NEURONTIN) 300 MG CAPSULE    ceived the following from [facility]: Outside name: gabapentin (NEURONTIN) 300 mg capsule    GEMFIBROZIL (LOPID) 600 MG TABLET    TAKE 1 TABLET BY MOUTH TWICE A DAY    METHOCARBAMOL (ROBAXIN) 500 MG TABLET    Take by mouth 3 times daily    VITAMIN D (CHOLECALCIFEROL) 125 MCG (5000 UT) CAPS CAPSULE    TAKE 1 CAPSULE BY MOUTH EVERY DAY       SCREENINGS               No data recorded      NIH Stroke Scale  Interval: Baseline  Level of Consciousness (1a): Alert  LOC Questions (1b): Answers both correctly  LOC Commands (1c): Performs both tasks correctly  Best Gaze (2): Normal  Visual (3): No visual loss  Facial Palsy (4): Normal symmetrical movement  Motor Arm, Left (5a): No drift  Motor Arm, Right (5b): No drift  Motor Leg, Left (6a): No drift  Motor Leg, Right (6b): No drift  Limb Ataxia (7): Absent  Sensory (8): Normal  Best Language (9): No aphasia  Dysarthria (10): Normal  Extinction and Inattention (11): No abnormality  Total: 0     PHYSICAL EXAM      ED Triage Vitals [12/01/24 1145]   Encounter Vitals Group      BP (!) 143/68      Systolic BP Percentile       Diastolic BP Percentile       Pulse 79      Respirations 16      Temp 97.7 °F (36.5 °C)      Temp Source Oral      SpO2 100 %      Weight - Scale 109.1 kg (240 lb 8.4 oz)      Height 1.727 m (5' 8\")      Head Circumference       Peak Flow       Pain Score       Pain Loc       Pain Education       Exclude from Growth Chart         Physical Exam  Vitals and nursing note reviewed.   Constitutional:       General: She is not in acute distress.     Appearance: Normal appearance. She is obese. She is

## 2024-12-01 NOTE — H&P
Hospitalist Admission Note    NAME:   Sheila Prescott   : 1960   MRN: [id number]     Date/Time: 2024 1:36 PM    Patient PCP: Buddy Hein MD    ______________________________________________________________________  Given the patient's current clinical presentation, I have a high level of concern for decompensation if discharged from the emergency department.  Complex decision making was performed, which includes reviewing the patient's available past medical records, laboratory results, and x-ray films.       My assessment of this patient's clinical condition and my plan of care is as follows.    Assessment / Plan:    TIA  -CT head shows no acute process.  CTA shows mild to moderate proximal stenosis of right MCA suggesting vasospasm versus noncalcified plaques  -Check MRI of the brain  -Start aspirin.  Check hemoglobin A1c and lipid panel.  Consider statin.  -Consult neurology.  Consult PT OT.  -Check 2D echocardiogram    Hypertension  Depression  Neuropathy  GERD  Edema of legs  -Resume home triamterene and hydrochlorothiazide from tomorrow  -Continue PTA BuSpar, gabapentin, PPI  -She is on as needed Lasix at home              Medical Decision Making:   I personally reviewed labs: CBC, BMP  I personally reviewed imaging: CT head  I personally reviewed EKG:  Toxic drug monitoring:   Discussed case with: ED provider. After discussion I am in agreement that acuity of patient's medical condition necessitates hospital stay.      Code Status: Full code  DVT Prophylaxis: Lovenox  Baseline:     Subjective:   CHIEF COMPLAINT: Left-sided numbness    HISTORY OF PRESENT ILLNESS:     Sheila Prescott is a 63 y.o.  female with PMHx significant for hypertension, dyslipidemia is coming the hospital chief complaints of left-sided numbness.  Patient reports being under stable control about this morning when she started having some left-sided numbness involving arm and leg and also face but symptoms really

## 2024-12-02 ENCOUNTER — TELEPHONE (OUTPATIENT)
Age: 64
End: 2024-12-02

## 2024-12-02 ENCOUNTER — APPOINTMENT (OUTPATIENT)
Facility: HOSPITAL | Age: 64
End: 2024-12-02
Attending: INTERNAL MEDICINE
Payer: COMMERCIAL

## 2024-12-02 VITALS
OXYGEN SATURATION: 97 % | HEIGHT: 68 IN | TEMPERATURE: 98.1 F | BODY MASS INDEX: 36.45 KG/M2 | SYSTOLIC BLOOD PRESSURE: 127 MMHG | WEIGHT: 240.52 LBS | RESPIRATION RATE: 18 BRPM | HEART RATE: 72 BPM | DIASTOLIC BLOOD PRESSURE: 65 MMHG

## 2024-12-02 LAB
ANION GAP SERPL CALC-SCNC: 7 MMOL/L (ref 2–12)
BUN SERPL-MCNC: 12 MG/DL (ref 6–20)
BUN/CREAT SERPL: 21 (ref 12–20)
CALCIUM SERPL-MCNC: 8.8 MG/DL (ref 8.5–10.1)
CHLORIDE SERPL-SCNC: 108 MMOL/L (ref 97–108)
CHOLEST SERPL-MCNC: 220 MG/DL
CO2 SERPL-SCNC: 24 MMOL/L (ref 21–32)
CREAT SERPL-MCNC: 0.57 MG/DL (ref 0.55–1.02)
ERYTHROCYTE [DISTWIDTH] IN BLOOD BY AUTOMATED COUNT: 13.4 % (ref 11.5–14.5)
EST. AVERAGE GLUCOSE BLD GHB EST-MCNC: 111 MG/DL
GLUCOSE SERPL-MCNC: 101 MG/DL (ref 65–100)
HBA1C MFR BLD: 5.5 % (ref 4–5.6)
HCT VFR BLD AUTO: 38.2 % (ref 35–47)
HDLC SERPL-MCNC: 51 MG/DL
HDLC SERPL: 4.3 (ref 0–5)
HGB BLD-MCNC: 12.4 G/DL (ref 11.5–16)
LDLC SERPL CALC-MCNC: 136.4 MG/DL (ref 0–100)
MCH RBC QN AUTO: 28.3 PG (ref 26–34)
MCHC RBC AUTO-ENTMCNC: 32.5 G/DL (ref 30–36.5)
MCV RBC AUTO: 87.2 FL (ref 80–99)
NRBC # BLD: 0 K/UL (ref 0–0.01)
NRBC BLD-RTO: 0 PER 100 WBC
PLATELET # BLD AUTO: 196 K/UL (ref 150–400)
PMV BLD AUTO: 10.3 FL (ref 8.9–12.9)
POTASSIUM SERPL-SCNC: 3.5 MMOL/L (ref 3.5–5.1)
RBC # BLD AUTO: 4.38 M/UL (ref 3.8–5.2)
SODIUM SERPL-SCNC: 139 MMOL/L (ref 136–145)
TRIGL SERPL-MCNC: 163 MG/DL
VLDLC SERPL CALC-MCNC: 32.6 MG/DL
WBC # BLD AUTO: 5.1 K/UL (ref 3.6–11)

## 2024-12-02 PROCEDURE — 97530 THERAPEUTIC ACTIVITIES: CPT

## 2024-12-02 PROCEDURE — 2580000003 HC RX 258: Performed by: INTERNAL MEDICINE

## 2024-12-02 PROCEDURE — 97162 PT EVAL MOD COMPLEX 30 MIN: CPT

## 2024-12-02 PROCEDURE — 83036 HEMOGLOBIN GLYCOSYLATED A1C: CPT

## 2024-12-02 PROCEDURE — 80048 BASIC METABOLIC PNL TOTAL CA: CPT

## 2024-12-02 PROCEDURE — 96372 THER/PROPH/DIAG INJ SC/IM: CPT

## 2024-12-02 PROCEDURE — 36415 COLL VENOUS BLD VENIPUNCTURE: CPT

## 2024-12-02 PROCEDURE — 85027 COMPLETE CBC AUTOMATED: CPT

## 2024-12-02 PROCEDURE — 97116 GAIT TRAINING THERAPY: CPT

## 2024-12-02 PROCEDURE — G0378 HOSPITAL OBSERVATION PER HR: HCPCS

## 2024-12-02 PROCEDURE — 99223 1ST HOSP IP/OBS HIGH 75: CPT | Performed by: PSYCHIATRY & NEUROLOGY

## 2024-12-02 PROCEDURE — 6360000002 HC RX W HCPCS: Performed by: INTERNAL MEDICINE

## 2024-12-02 PROCEDURE — 80061 LIPID PANEL: CPT

## 2024-12-02 PROCEDURE — 6370000000 HC RX 637 (ALT 250 FOR IP): Performed by: INTERNAL MEDICINE

## 2024-12-02 PROCEDURE — 97165 OT EVAL LOW COMPLEX 30 MIN: CPT

## 2024-12-02 RX ORDER — ATORVASTATIN CALCIUM 40 MG/1
40 TABLET, FILM COATED ORAL NIGHTLY
Status: DISCONTINUED | OUTPATIENT
Start: 2024-12-02 | End: 2024-12-02 | Stop reason: HOSPADM

## 2024-12-02 RX ORDER — METHOCARBAMOL 500 MG/1
500 TABLET, FILM COATED ORAL 3 TIMES DAILY
Qty: 30 TABLET | Refills: 0 | Status: SHIPPED | OUTPATIENT
Start: 2024-12-02 | End: 2024-12-12

## 2024-12-02 RX ORDER — ASPIRIN 81 MG/1
81 TABLET, CHEWABLE ORAL DAILY
COMMUNITY

## 2024-12-02 RX ORDER — BUSPIRONE HYDROCHLORIDE 5 MG/1
5 TABLET ORAL 3 TIMES DAILY
Qty: 90 TABLET | Refills: 0 | Status: SHIPPED | OUTPATIENT
Start: 2024-12-02 | End: 2025-01-01

## 2024-12-02 RX ORDER — DULOXETIN HYDROCHLORIDE 20 MG/1
20 CAPSULE, DELAYED RELEASE ORAL 2 TIMES DAILY
Qty: 30 CAPSULE | Refills: 3 | Status: SHIPPED | OUTPATIENT
Start: 2024-12-02

## 2024-12-02 RX ADMIN — METHOCARBAMOL TABLETS 500 MG: 500 TABLET, COATED ORAL at 08:45

## 2024-12-02 RX ADMIN — ENOXAPARIN SODIUM 30 MG: 100 INJECTION SUBCUTANEOUS at 08:44

## 2024-12-02 RX ADMIN — OXYCODONE HYDROCHLORIDE AND ACETAMINOPHEN 1000 MG: 500 TABLET ORAL at 08:45

## 2024-12-02 RX ADMIN — TRAMADOL HYDROCHLORIDE 50 MG: 50 TABLET, COATED ORAL at 10:39

## 2024-12-02 RX ADMIN — TRAMADOL HYDROCHLORIDE 50 MG: 50 TABLET, COATED ORAL at 02:08

## 2024-12-02 RX ADMIN — BUSPIRONE HYDROCHLORIDE 5 MG: 5 TABLET ORAL at 08:44

## 2024-12-02 RX ADMIN — ASPIRIN 81 MG: 81 TABLET, CHEWABLE ORAL at 08:45

## 2024-12-02 RX ADMIN — SODIUM CHLORIDE, PRESERVATIVE FREE 10 ML: 5 INJECTION INTRAVENOUS at 08:46

## 2024-12-02 RX ADMIN — DULOXETINE HYDROCHLORIDE 20 MG: 20 CAPSULE, DELAYED RELEASE ORAL at 08:45

## 2024-12-02 ASSESSMENT — PAIN DESCRIPTION - LOCATION
LOCATION: BACK

## 2024-12-02 ASSESSMENT — PAIN DESCRIPTION - DESCRIPTORS
DESCRIPTORS: ACHING
DESCRIPTORS: ACHING

## 2024-12-02 ASSESSMENT — PAIN DESCRIPTION - PAIN TYPE
TYPE: CHRONIC PAIN
TYPE: CHRONIC PAIN

## 2024-12-02 ASSESSMENT — PAIN SCALES - GENERAL
PAINLEVEL_OUTOF10: 8
PAINLEVEL_OUTOF10: 6
PAINLEVEL_OUTOF10: 6
PAINLEVEL_OUTOF10: 4

## 2024-12-02 ASSESSMENT — PAIN - FUNCTIONAL ASSESSMENT
PAIN_FUNCTIONAL_ASSESSMENT: ACTIVITIES ARE NOT PREVENTED
PAIN_FUNCTIONAL_ASSESSMENT: ACTIVITIES ARE NOT PREVENTED

## 2024-12-02 ASSESSMENT — PAIN DESCRIPTION - FREQUENCY
FREQUENCY: INTERMITTENT
FREQUENCY: CONTINUOUS

## 2024-12-02 ASSESSMENT — PAIN DESCRIPTION - ONSET
ONSET: ON-GOING
ONSET: ON-GOING

## 2024-12-02 ASSESSMENT — PAIN DESCRIPTION - ORIENTATION
ORIENTATION: LOWER;POSTERIOR
ORIENTATION: POSTERIOR;LOWER

## 2024-12-02 NOTE — DISCHARGE SUMMARY
Discharge Summary    Name: Sheila Prescott  [id number]  YOB: 1960 (Age: 63 y.o.)   Date of Admission: 12/1/2024  Date of Discharge: 12/2/2024  Attending Physician: Gregg Gr MD    Discharge Diagnosis:   TIA rule out   Patient admitted to neuro tele   Hypertension  Depression  Neuropathy  GERD  Edema of legs        Consultations:  IP CONSULT TO NEUROLOGY  IP CONSULT TO CASE MANAGEMENT  IP CONSULT TO STROKE COORDINATOR  IP CONSULT TO PHARMACY        Brief Admission History/Reason for Admission Per Junito Rodriguez MD:   Sheila Prescott is a 63 y.o.  female with PMHx significant for hypertension, dyslipidemia is coming the hospital chief complaints of left-sided numbness.  Patient reports being under stable control about this morning when she started having some left-sided numbness involving arm and leg and also face but symptoms really got worse this morning which prompted her to come to the emergency department.  She did not report any symptoms on the right side.  Does not report any passing out episodes.  No seizure-like activity.  No vision deficits.  No chest pain or shortness of breath.     On arrival to ED, noted to have stable vitals, labs BMP is within normal limits.  LFTs are normal.  TSH is 3.2.  CBC is within normal limits.  CT head shows no acute process.  CTA shows no large vessel occlusion nonspecific mild to moderate proximal stenosis of right MCA may suggest vasospasm versus calcified plaques  We were asked to admit for work up and evaluation of the above problems.      Brief Hospital Course by Main Problems:      TIA rule out   Patient admitted to neuro tele   CT head shows no acute process.  CTA shows mild to moderate proximal stenosis of right MCA suggesting vasospasm versus noncalcified plaques  Mri brain 0 on 12/1/24- Minimal chronic microvascular ischemic change.There is no intracranial mass, hemorrhage or evidence of acute infarction. No

## 2024-12-02 NOTE — CONSULTS
Neurology Note    Patient ID:  Sheila Prescott  [id number]  63 y.o.  1960      Date of Consultation:  December 2, 2024    Referring Physician: Dr. Rodriguez    Reason for Consultation:  left sided numbness      Assessment and Plan:    The patient is a pleasant 63-year-old female who presents to the hospital with transient left-sided sensory deficit.  Her current neurological examination reveals no focal sensory or motor deficit.      Transient neurological symptoms  Head CT with no acute abnormality  Brain MRI with no evidence of an acute stroke.  There is mild chronic microvascular ischemic disease  CTA reveals no large vessel stenosis.  There was mild intracranial stenosis  This would be most consistent with a TIA.  Associated with anxiety/panic attack cannot be completely excluded  Risk factors for stroke do include hypertension and dyslipidemia  Patient should be on 81 mg aspirin.  Risks and side effects were discussed with the patient  Dyslipidemia: .  Goal LDL is less than 70.  Patient should be on intensive statin therapy  Hemoglobin A1c pending. Continue aggressive glycemic control  Hypertension: Aggressive control with goal blood pressure of less than 140/90  Patient did have an echocardiogram reportedly within the last year.  She will look to obtain those results  I provided stroke education today in regards to risk factors for stroke and lifestyle modifications to help minimize the risk of future stroke.  This included medication compliance, regular follow up with primary care physician,  and healthy lifestyle habits (nutrition/exercise)      Fibromyalgia  Depression  Reflux disease  Chronic low back pain:  Patient is scheduled to see her pain specialist tomorrow      There is no other additional neurology recommendations at this time.  If questions arise, please not hesitate to contact me and I will return to see the patient.  The patient should follow-up in clinic in approximately 4 weeks

## 2024-12-02 NOTE — PLAN OF CARE
Problem: Discharge Planning  Goal: Discharge to home or other facility with appropriate resources  12/1/2024 1522 by Rosemarie Wilson RN  Outcome: Progressing  12/1/2024 1521 by Rosemarie Wilson RN  Outcome: Progressing     Problem: Pain  Goal: Verbalizes/displays adequate comfort level or baseline comfort level  12/1/2024 1522 by Rosemarie Wilson RN  Outcome: Progressing  12/1/2024 1521 by Rosemarie Wilson RN  Outcome: Progressing     Problem: Safety - Adult  Goal: Free from fall injury  12/1/2024 1522 by Rosemarie Wilson RN  Outcome: Progressing  12/1/2024 1521 by Rosemarie Wilson RN  Outcome: Progressing     Problem: Neurosensory - Adult  Goal: Achieves stable or improved neurological status  Outcome: Progressing  Goal: Absence of seizures  Outcome: Progressing  Goal: Remains free of injury related to seizures activity  Outcome: Progressing  Goal: Achieves maximal functionality and self care  Outcome: Progressing     Problem: Respiratory - Adult  Goal: Achieves optimal ventilation and oxygenation  Outcome: Progressing     Problem: Cardiovascular - Adult  Goal: Maintains optimal cardiac output and hemodynamic stability  Outcome: Progressing  Goal: Absence of cardiac dysrhythmias or at baseline  Outcome: Progressing     Problem: Skin/Tissue Integrity - Adult  Goal: Skin integrity remains intact  Outcome: Progressing  Goal: Incisions, wounds, or drain sites healing without S/S of infection  Outcome: Progressing  Goal: Oral mucous membranes remain intact  Outcome: Progressing     Problem: Musculoskeletal - Adult  Goal: Return mobility to safest level of function  Outcome: Progressing  Goal: Maintain proper alignment of affected body part  Outcome: Progressing  Goal: Return ADL status to a safe level of function  Outcome: Progressing     Problem: Gastrointestinal - Adult  Goal: Minimal or absence of nausea and vomiting  Outcome: Progressing  Goal: Maintains or returns to baseline bowel function  Outcome: 
  Problem: Discharge Planning  Goal: Discharge to home or other facility with appropriate resources  12/2/2024 0831 by Rosemarie Wilson RN  Outcome: Progressing  12/2/2024 0010 by Adrinana Bailey LPN  Outcome: Progressing     Problem: Pain  Goal: Verbalizes/displays adequate comfort level or baseline comfort level  12/2/2024 0831 by Rosemarie Wilson RN  Outcome: Progressing  12/2/2024 0010 by Adrianna Bailey LPN  Outcome: Progressing     Problem: Safety - Adult  Goal: Free from fall injury  12/2/2024 0831 by Rosemarie Wilson RN  Outcome: Progressing  12/2/2024 0010 by Adrianna Bailey LPN  Outcome: Progressing     Problem: Neurosensory - Adult  Goal: Achieves stable or improved neurological status  12/2/2024 0831 by Rosemarie Wilson RN  Outcome: Progressing  12/2/2024 0010 by Adrianna Bailey LPN  Outcome: Progressing  Goal: Absence of seizures  12/2/2024 0831 by Rosemarie Wilson RN  Outcome: Progressing  12/2/2024 0010 by Adrianna Bailey LPN  Outcome: Progressing  Goal: Remains free of injury related to seizures activity  12/2/2024 0831 by Rosemarie Wilson RN  Outcome: Progressing  12/2/2024 0010 by Adrianna Bailey LPN  Outcome: Progressing  Goal: Achieves maximal functionality and self care  12/2/2024 0831 by Rosemarie Wilson RN  Outcome: Progressing  12/2/2024 0010 by Adrianna Bailey LPN  Outcome: Progressing     Problem: Respiratory - Adult  Goal: Achieves optimal ventilation and oxygenation  12/2/2024 0831 by Rosemarie Wilson RN  Outcome: Progressing  12/2/2024 0010 by Adrianna Bailey LPN  Outcome: Progressing     Problem: Cardiovascular - Adult  Goal: Maintains optimal cardiac output and hemodynamic stability  12/2/2024 0831 by Rosemarie Wilson RN  Outcome: Progressing  12/2/2024 0010 by Adrianna Bailey LPN  Outcome: Progressing  Goal: Absence of cardiac dysrhythmias or at baseline  12/2/2024 0831 by Rosemarie Wilson RN  Outcome: Progressing  12/2/2024 0010 by Adrianna Bailey, ALYSHA  Outcome: 
  Problem: Discharge Planning  Goal: Discharge to home or other facility with appropriate resources  Outcome: Progressing     Problem: Pain  Goal: Verbalizes/displays adequate comfort level or baseline comfort level  Outcome: Progressing     Problem: Safety - Adult  Goal: Free from fall injury  Outcome: Progressing     
Completed  12/2/2024 0831 by Rosemarie Wilson RN  Outcome: Progressing  12/2/2024 0010 by Adrianna Bailey LPN  Outcome: Progressing     Problem: Gastrointestinal - Adult  Goal: Minimal or absence of nausea and vomiting  12/2/2024 1240 by Rosemarie Wilson RN  Outcome: Completed  12/2/2024 0831 by Rosemarie Wilson RN  Outcome: Progressing  12/2/2024 0010 by Adrianna Bailey LPN  Outcome: Progressing  Goal: Maintains or returns to baseline bowel function  12/2/2024 1240 by Rosemarie Wilson RN  Outcome: Completed  12/2/2024 0831 by Rosemarie Wilson RN  Outcome: Progressing  12/2/2024 0010 by Adrianna Bailey LPN  Outcome: Progressing  Goal: Maintains adequate nutritional intake  12/2/2024 1240 by Rosemarie Wilson RN  Outcome: Completed  12/2/2024 0831 by Rosemarie Wilson RN  Outcome: Progressing  12/2/2024 0010 by Adrianna Bailey LPN  Outcome: Progressing  Goal: Establish and maintain optimal ostomy function  12/2/2024 1240 by Rosemarie Wilson RN  Outcome: Completed  12/2/2024 0831 by Rosemarie Wilson RN  Outcome: Progressing  12/2/2024 0010 by Adrianna Bailey LPN  Outcome: Progressing     Problem: Genitourinary - Adult  Goal: Absence of urinary retention  12/2/2024 1240 by Rosemarie Wilson RN  Outcome: Completed  12/2/2024 0831 by Rosemarie Wilson RN  Outcome: Progressing  12/2/2024 0010 by Adrianna Bailey LPN  Outcome: Progressing  Goal: Urinary catheter remains patent  12/2/2024 1240 by Rosemarie Wilson RN  Outcome: Completed  12/2/2024 0831 by Rosemarie Wilson RN  Outcome: Progressing  12/2/2024 0010 by Adrianna Bailey LPN  Outcome: Progressing     Problem: Infection - Adult  Goal: Absence of infection at discharge  12/2/2024 1240 by Rosemarie Wilson RN  Outcome: Completed  12/2/2024 0831 by Rosemarie Wilson RN  Outcome: Progressing  12/2/2024 0010 by Adrianna Bailey LPN  Outcome: Progressing  Goal: Absence of infection during hospitalization  12/2/2024 1240 by Rosemarie Wilson, FABIENNE  Outcome: 
function  12/2/2024 0010 by Adrianna Bailey LPN  Outcome: Progressing  12/1/2024 1522 by Rosemarie Wilson RN  Outcome: Progressing     Problem: Genitourinary - Adult  Goal: Absence of urinary retention  12/2/2024 0010 by Adrianna Bailey LPN  Outcome: Progressing  12/1/2024 1522 by Rosemarie Wilson RN  Outcome: Progressing  Goal: Urinary catheter remains patent  12/2/2024 0010 by Adrianna Bailey LPN  Outcome: Progressing  12/1/2024 1522 by Rosemarie Wilson RN  Outcome: Progressing     Problem: Infection - Adult  Goal: Absence of infection at discharge  12/2/2024 0010 by Adrianna Bailey LPN  Outcome: Progressing  12/1/2024 1522 by Rosemarie Wilson RN  Outcome: Progressing  Goal: Absence of infection during hospitalization  12/2/2024 0010 by Adrianna Bailey LPN  Outcome: Progressing  12/1/2024 1522 by Rosemarie Wilson RN  Outcome: Progressing  Goal: Absence of fever/infection during anticipated neutropenic period  12/2/2024 0010 by Adrianna Bailey LPN  Outcome: Progressing  12/1/2024 1522 by Rosemarie Wilson RN  Outcome: Progressing     Problem: Metabolic/Fluid and Electrolytes - Adult  Goal: Electrolytes maintained within normal limits  12/2/2024 0010 by Adrianna Bailey LPN  Outcome: Progressing  12/1/2024 1522 by Rosemarie Wilson RN  Outcome: Progressing  Goal: Hemodynamic stability and optimal renal function maintained  12/2/2024 0010 by Adrianna Bailey LPN  Outcome: Progressing  12/1/2024 1522 by Rosemarie Wilson RN  Outcome: Progressing  Goal: Glucose maintained within prescribed range  12/2/2024 0010 by Adrianna Bailey LPN  Outcome: Progressing  12/1/2024 1522 by Rosemarie Wilson RN  Outcome: Progressing     Problem: Hematologic - Adult  Goal: Maintains hematologic stability  12/2/2024 0010 by Adrianna Bailey LPN  Outcome: Progressing  12/1/2024 1522 by Rosemarie Wilson RN  Outcome: Progressing     Problem: ABCDS Injury Assessment  Goal: Absence of physical injury  Outcome: Progressing

## 2024-12-02 NOTE — PROGRESS NOTES
- Please complete MRI History and Safety Screening Form.  - Patient cannot be scanned until this form is completed and reviewed in MRI to ensure patient is SAFE and eligible for MRI.  - CALL MRI when this has been successfully completed at [phone].   
End of Shift Note    Bedside shift change report given to  ALYSHA Rodas  (oncoming nurse) by FABIENNE Houston.        Shift worked:  Day   Shift summary and any significant changes:     Patient received from ER. Pharmacy consult placed as patient cannot remember her home med doses and does not have her cell phone with her at this time, someone is  bringing it.  MRI completed, need read. CT/CTA completed. Need ECHO.       Concerns for physician to address:  Testing results    Zone phone for oncoming shift:        Patient Information  Sheila Prescott  63 y.o.  12/1/2024 12:04 PM by Junito Rodriguez MD. Sheila Prescott was admitted from [facility]    Problem List  Patient Active Problem List    Diagnosis Date Noted    TIA (transient ischemic attack) 12/01/2024    Obesity, morbid 12/11/2020    Anxiety 10/29/2020    Pneumonia due to COVID-19 virus 10/23/2020    Acute respiratory failure with hypoxia 10/23/2020    Elevated LFTs 10/23/2020    JUAN PABLO on CPAP 10/23/2020    Hypokalemia 10/23/2020    Xerostomia 05/10/2020    Renal stones 10/28/2014    Fibromyalgia muscle pain 10/28/2014     Past Medical History:   Diagnosis Date    Fibromyalgia     Hypercholesterolemia     Hypertension     Kidney stones     passed 72 stones    Other ill-defined conditions(799.89)     kidney stones       Core Measures:  CVA: yes  CHF: no  PNA: no    Activity:   Number times ambulated in hallways past shift: 0  Number of times OOB to chair past shift: 0    Cardiac:   Cardiac Monitoring: yes,     Access:   Current line(s): PIV    Respiratory:   O2 Device: None (Room air)    GI:  Last BM (including prior to admit): 12/01/24  Current diet:  ADULT DIET; Regular  Tolerating current diet: Yes    Pain Management:   Patient states pain is manageable on current regimen: no - home medication not reordered yet.     Skin:  James Scale Score: 23  Interventions: dual skin   Pressure injury: no    Patient Safety:  Fall Score: Guerin Total Score: 60  Interventions: stay with 
End of Shift Note    Bedside shift change report given to FABIENNE Houston (oncoming nurse) by Adrianna Bailey LPN .        Shift worked:  Nights   Shift summary and any significant changes:    MRI completed & resulted; Neuro, PT/OT/SLP following; Awaiting Echo; Morning labs completed; PRN Tramadol given for chronic back pain x1; No acute changes overnight.   Concerns for physician to address: See above   Zone phone for oncoming shift:  [phone]     Patient Information  Sheila Prescott  63 y.o.  12/1/2024 12:04 PM by Junito Rodriguez MD. Sheila Prescott was admitted from [facility]    Problem List  Patient Active Problem List    Diagnosis Date Noted    TIA (transient ischemic attack) 12/01/2024    Obesity, morbid 12/11/2020    Anxiety 10/29/2020    Pneumonia due to COVID-19 virus 10/23/2020    Acute respiratory failure with hypoxia 10/23/2020    Elevated LFTs 10/23/2020    JUAN PABLO on CPAP 10/23/2020    Hypokalemia 10/23/2020    Xerostomia 05/10/2020    Renal stones 10/28/2014    Fibromyalgia muscle pain 10/28/2014     Past Medical History:   Diagnosis Date    Fibromyalgia     Hypercholesterolemia     Hypertension     Kidney stones     passed 72 stones    Other ill-defined conditions(799.89)     kidney stones       Core Measures:  CVA: yes  CHF: N/A  PNA: N/A    Activity:Level of Assistance: Independent after set-up  Number times ambulated in hallways past shift: 0  Number of times OOB to chair past shift: 0    Cardiac:   Cardiac Monitoring: yes, Tele box 062    Access:   Current line(s): PIV    Respiratory:   O2 Device: None (Room air)    GI:  Last BM (including prior to admit): 01/01/24  Current diet:  ADULT DIET; Regular  Tolerating current diet: Yes    Pain Management:   Patient states pain is manageable on current regimen: yes    Skin:  James Scale Score: 21  Interventions: N/A  Pressure injury: no    Patient Safety:  Fall Score: Guerin Total Score: 45  Interventions: bed alarm  Self-release roll belt: No  Dexterity to release 
PHYSICAL THERAPY EVALUATION/DISCHARGE    Patient: Sheila Prescott (63 y.o. female)  Date: 12/2/2024  Primary Diagnosis: TIA (transient ischemic attack) [G45.9]       Precautions: Up as Tolerated                      ASSESSMENT AND RECOMMENDATIONS:  Based on the objective data below, the patient presents with chronic back pain however good strength, intact balance, and overall baseline independent functional mobility. Strength equal and intact throughout formal and functional testing. Gait steady with no LOB/balance deficits noted as pt independently ambulated >200ft. Pt reports feeling at her baseline functional status with completion resolution of previous c/o facial numbness. Pt also with good insight into BEFAST. Pt has no further skilled therapy needs and is safe to return home w/ no additional needs.     Functional Outcome Measure:  The patient scored 52/56 on the Carballo Balance Test outcome measure which is indicative of low falls risk.          Further skilled acute physical therapy is not indicated at this time.       PLAN :  Recommendation for discharge: (in order for the patient to meet his/her long term goals):   No skilled physical therapy    Other factors to consider for discharge: no additional factors    IF patient discharges home will need the following DME: none       SUBJECTIVE:   Patient stated “I've been under a lot of stress recently, my mom and brother just passed away.”    OBJECTIVE DATA SUMMARY:     Past Medical History:   Diagnosis Date    Fibromyalgia     Hypercholesterolemia     Hypertension     Kidney stones     passed 72 stones    Other ill-defined conditions(799.89)     kidney stones     Past Surgical History:   Procedure Laterality Date    OTHER SURGICAL HISTORY      lung biopsy       Home Situation and Prior Level of Function: independent w/ ambulation and ADLs. Still driving. Works full-time. Lives with multiple family members.   Social/Functional History  Lives With: Family  Type of 
Patient given discharge information with opportunity to ask questions and all questions answered.  Patient in possession of all belongings.   
Pharmacy Medication Reconciliation     The patient was interviewed regarding current PTA medication list, use and drug allergies;  patient present in room and obtained permission from patient to discuss drug regimen with visitor(s) present. The patient was questioned regarding use of any other inhalers, topical products, over the counter medications, herbal medications, vitamin products or ophthalmic/nasal/otic medication use.     Allergy Update: Sulfa antibiotics    Recommendations/Findings:   The following amendments were made to the patient's active medication list on file at [facility]:   1) Additions:   Aspirin 81 mg    2) Deletions:   Lopid  Vitamin D3  Chlorzoxazone    3) Changes:   Buspar 30 mg BID --> 15 mg BID   Gabapentin 600 mg pt takes 300 mg in the morning and 1200 mg at night       Pertinent Findings: Patient did not sleep well last night due to only getting 100 mg of her gabapentin, and asks if she could get her regular dose. Patient stated that she takes PPI PRN, advised to use famotidine instead if she wants immediate symptom relief for acid reflux and that the PPI takes a few days to work. Patient stated that she would like to take OTC baby aspirin to prevent future incidences regarding stroke. Physician talked to patient about potentially adding a statin to her daily regimen as well.    Clarified PTA med list with patient. PTA medication list was corrected to the following:     Prior to Admission Medications   Prescriptions Last Dose Informant   DULoxetine (CYMBALTA) 60 MG extended release capsule 12/2/2024 Self   Sig: Take 1 capsule by mouth daily   Phentermine-Topiramate 7.5-46 MG CP24 11/30/2024 Self   Sig: Take 1 capsule by mouth daily. Max Daily Amount: 1 capsule   ascorbic acid (VITAMIN C) 1000 MG tablet 12/2/2024 Self   Sig: Take 1 tablet by mouth daily   aspirin 81 MG chewable tablet 12/2/2024    Sig: Take 1 tablet by mouth daily   busPIRone (BUSPAR) 30 MG tablet 12/2/2024 Self   Sig: Take 15 
Speech Pathology Contact Note:    Orders received and appreciated for SLP evaluation. Per discussion w/ RN, no concerns for speech/communication changes or swallowing difficulty.  Pt currently tolerating regular diet w/ thin liquids without concern. MRI 12/1: \"There is no intracranial mass, hemorrhage or evidence of acute infarction.\" No further acute SLP services warranted at this time. SLP will sign off. Please re-consult if concerns arise in the future. Thanks!     Adrianna Meng, MARIANGEL-SLP    
capsule  Commonly known as: PROMETRIUM     tiZANidine 4 MG tablet  Commonly known as: ZANAFLEX     topiramate 50 MG tablet  Commonly known as: TOPAMAX     traMADol 50 MG tablet  Commonly known as: ULTRAM     triamterene-hydroCHLOROthiazide 37.5-25 MG per tablet  Commonly known as: MAXZIDE-25     zolpidem 10 MG tablet  Commonly known as: AMBIEN           * This list has 4 medication(s) that are the same as other medications prescribed for you. Read the directions carefully, and ask your doctor or other care provider to review them with you.                      DISPOSITION:    Home with Family: x      Home with HH/PT/OT/RN:    SNF/LTC:    LUZ:    OTHER:            Code status: full  Recommended diet: regular diet  Recommended activity: activity as tolerated  Wound care: None      Follow up with:   PCP : Buddy Hein MD    No follow-up provider specified.        Total time in minutes spent coordinating this discharge (includes going over instructions, follow-up, prescriptions, and preparing report for sign off to her PCP) :  35 minutes   
points   Moderate = 34-47 points   Mild = 48-66 points  YEYO Zazueta., EBONI Patel, DEEPTI Hutchins, ULYSSES High, & PHANI Main (1992). Measurement of motor recovery after stroke: Outcome assessment and sample size requirements. Stroke, 23, pp. 8117-7804.   --------------------------------------------------------------------------------------------------------------------------------------------------------------------  MCID:  Stroke:   (Jamel et al, 2001; n = 171; mean age 70 (11) years; assessed within 17 (12) days of stroke, Acute Stroke)  FMA Motor Scores from Admission to Discharge   10 point increase in FMA Upper Extremity = 1.5 change in discharge FIM   10 point increase in FMA Lower Extremity = 1.9 change in discharge FIM  MDC:   Stroke:   (Binh et al, 2008, n = 14, mean age = 59.9 (14.6) years, assessed on average 14 (6.5) months post stroke, Chronic Stroke)   FMA = 5.2 points for the Upper Extremity portion of the assessment           Pain Ratin/10     Activity Tolerance:   Good    After treatment:   Patient left in no apparent distress sitting up in chair, Call bell within reach, Bed/ chair alarm activated, and Updated patient's board on functional status and mobility recommendations. Pharmacy rep in room     COMMUNICATION/EDUCATION:   The patient's plan of care was discussed with: registered nurse    Patient Education  Education Given To: Patient  Education Provided: Role of Therapy  Education Provided Comments: BEFAST  Education Method: Verbal  Barriers to Learning: None  Education Outcome: Verbalized understanding    Thank you for this referral.  Tracie Gurrola OT  Minutes: 18    Occupational Therapy Evaluation Charge Determination   History Examination Decision-Making   LOW Complexity : Brief history review  LOW Complexity: 1-3 Performance deficits relating to physical, cognitive, or psychosocial skills that result in activity limitations and/or participation restrictions LOW Complexity: No

## 2024-12-02 NOTE — CARE COORDINATION
Follow Up Transport Self   Condition of Participation: Discharge Planning   The Plan for Transition of Care is related to the following treatment goals: Home with follow up appts.   The Patient and/or Patient Representative was provided with a Choice of Provider? Patient   The Patient and/Or Patient Representative agree with the Discharge Plan? Yes   Freedom of Choice list was provided with basic dialogue that supports the patient's individualized plan of care/goals, treatment preferences, and shares the quality data associated with the providers?  Yes       
no

## 2024-12-02 NOTE — TELEPHONE ENCOUNTER
Please schedule patient for a hospital follow up appointment in clinic    Neurology provider: any provider      In person or virtual:     When: 4-8 weeks    Diagnosis/reason for follow up:  tia    Thanks!

## 2024-12-03 LAB
EKG ATRIAL RATE: 68 BPM
EKG DIAGNOSIS: NORMAL
EKG P AXIS: 66 DEGREES
EKG P-R INTERVAL: 182 MS
EKG Q-T INTERVAL: 436 MS
EKG QRS DURATION: 152 MS
EKG QTC CALCULATION (BAZETT): 463 MS
EKG R AXIS: 55 DEGREES
EKG T AXIS: 11 DEGREES
EKG VENTRICULAR RATE: 68 BPM

## 2024-12-04 ENCOUNTER — APPOINTMENT (OUTPATIENT)
Facility: HOSPITAL | Age: 64
End: 2024-12-04
Payer: COMMERCIAL

## 2024-12-04 ENCOUNTER — HOSPITAL ENCOUNTER (OUTPATIENT)
Facility: HOSPITAL | Age: 64
Setting detail: OBSERVATION
LOS: 1 days | Discharge: HOME | End: 2024-12-05
Attending: STUDENT IN AN ORGANIZED HEALTH CARE EDUCATION/TRAINING PROGRAM | Admitting: STUDENT IN AN ORGANIZED HEALTH CARE EDUCATION/TRAINING PROGRAM
Payer: COMMERCIAL

## 2024-12-04 DIAGNOSIS — M79.7 FIBROMYALGIA: ICD-10-CM

## 2024-12-04 DIAGNOSIS — G45.9 TRANSIENT CEREBRAL ISCHEMIA, UNSPECIFIED TYPE: Primary | ICD-10-CM

## 2024-12-04 DIAGNOSIS — R20.0 EXTREMITY NUMBNESS: ICD-10-CM

## 2024-12-04 DIAGNOSIS — R29.810 FACIAL DROOP: ICD-10-CM

## 2024-12-04 LAB
ALBUMIN SERPL-MCNC: 3.4 G/DL (ref 3.5–5)
ALBUMIN/GLOB SERPL: 1 (ref 1.1–2.2)
ALP SERPL-CCNC: 58 U/L (ref 45–117)
ALT SERPL-CCNC: 22 U/L (ref 12–78)
ANION GAP SERPL CALC-SCNC: 4 MMOL/L (ref 2–12)
AST SERPL-CCNC: 14 U/L (ref 15–37)
BASOPHILS # BLD: 0.1 K/UL (ref 0–0.1)
BASOPHILS NFR BLD: 1 % (ref 0–1)
BILIRUB SERPL-MCNC: 0.4 MG/DL (ref 0.2–1)
BUN SERPL-MCNC: 14 MG/DL (ref 6–20)
BUN/CREAT SERPL: 23 (ref 12–20)
CALCIUM SERPL-MCNC: 8.7 MG/DL (ref 8.5–10.1)
CHLORIDE SERPL-SCNC: 113 MMOL/L (ref 97–108)
CO2 SERPL-SCNC: 25 MMOL/L (ref 21–32)
CREAT SERPL-MCNC: 0.6 MG/DL (ref 0.55–1.02)
DIFFERENTIAL METHOD BLD: ABNORMAL
EOSINOPHIL # BLD: 0.2 K/UL (ref 0–0.4)
EOSINOPHIL NFR BLD: 3 % (ref 0–7)
ERYTHROCYTE [DISTWIDTH] IN BLOOD BY AUTOMATED COUNT: 13.3 % (ref 11.5–14.5)
GLOBULIN SER CALC-MCNC: 3.5 G/DL (ref 2–4)
GLUCOSE BLD STRIP.AUTO-MCNC: 112 MG/DL (ref 65–117)
GLUCOSE SERPL-MCNC: 92 MG/DL (ref 65–100)
HCT VFR BLD AUTO: 39.3 % (ref 35–47)
HGB BLD-MCNC: 12.8 G/DL (ref 11.5–16)
IMM GRANULOCYTES # BLD AUTO: 0 K/UL (ref 0–0.04)
IMM GRANULOCYTES NFR BLD AUTO: 1 % (ref 0–0.5)
INR PPP: 1 (ref 0.9–1.1)
LYMPHOCYTES # BLD: 1.5 K/UL (ref 0.8–3.5)
LYMPHOCYTES NFR BLD: 26 % (ref 12–49)
MCH RBC QN AUTO: 29.6 PG (ref 26–34)
MCHC RBC AUTO-ENTMCNC: 32.6 G/DL (ref 30–36.5)
MCV RBC AUTO: 90.8 FL (ref 80–99)
MONOCYTES # BLD: 0.6 K/UL (ref 0–1)
MONOCYTES NFR BLD: 11 % (ref 5–13)
NEUTS SEG # BLD: 3.4 K/UL (ref 1.8–8)
NEUTS SEG NFR BLD: 58 % (ref 32–75)
NRBC # BLD: 0 K/UL (ref 0–0.01)
NRBC BLD-RTO: 0 PER 100 WBC
PLATELET # BLD AUTO: 203 K/UL (ref 150–400)
PMV BLD AUTO: 10.2 FL (ref 8.9–12.9)
POTASSIUM SERPL-SCNC: 3.7 MMOL/L (ref 3.5–5.1)
PROT SERPL-MCNC: 6.9 G/DL (ref 6.4–8.2)
PROTHROMBIN TIME: 10.3 SEC (ref 9–11.1)
RBC # BLD AUTO: 4.33 M/UL (ref 3.8–5.2)
SERVICE CMNT-IMP: NORMAL
SODIUM SERPL-SCNC: 142 MMOL/L (ref 136–145)
TROPONIN I SERPL HS-MCNC: 7 NG/L (ref 0–51)
WBC # BLD AUTO: 5.8 K/UL (ref 3.6–11)

## 2024-12-04 PROCEDURE — 6360000004 HC RX CONTRAST MEDICATION: Performed by: STUDENT IN AN ORGANIZED HEALTH CARE EDUCATION/TRAINING PROGRAM

## 2024-12-04 PROCEDURE — 6370000000 HC RX 637 (ALT 250 FOR IP)

## 2024-12-04 PROCEDURE — 85025 COMPLETE CBC W/AUTO DIFF WBC: CPT

## 2024-12-04 PROCEDURE — 70498 CT ANGIOGRAPHY NECK: CPT

## 2024-12-04 PROCEDURE — 6370000000 HC RX 637 (ALT 250 FOR IP): Performed by: NURSE PRACTITIONER

## 2024-12-04 PROCEDURE — 1100000003 HC PRIVATE W/ TELEMETRY

## 2024-12-04 PROCEDURE — 80053 COMPREHEN METABOLIC PANEL: CPT

## 2024-12-04 PROCEDURE — 93005 ELECTROCARDIOGRAM TRACING: CPT | Performed by: STUDENT IN AN ORGANIZED HEALTH CARE EDUCATION/TRAINING PROGRAM

## 2024-12-04 PROCEDURE — 0042T CT BRAIN PERFUSION: CPT

## 2024-12-04 PROCEDURE — 70551 MRI BRAIN STEM W/O DYE: CPT

## 2024-12-04 PROCEDURE — 6360000002 HC RX W HCPCS: Performed by: STUDENT IN AN ORGANIZED HEALTH CARE EDUCATION/TRAINING PROGRAM

## 2024-12-04 PROCEDURE — 36415 COLL VENOUS BLD VENIPUNCTURE: CPT

## 2024-12-04 PROCEDURE — 70450 CT HEAD/BRAIN W/O DYE: CPT

## 2024-12-04 PROCEDURE — 85610 PROTHROMBIN TIME: CPT

## 2024-12-04 PROCEDURE — 82962 GLUCOSE BLOOD TEST: CPT

## 2024-12-04 PROCEDURE — 2580000003 HC RX 258: Performed by: STUDENT IN AN ORGANIZED HEALTH CARE EDUCATION/TRAINING PROGRAM

## 2024-12-04 PROCEDURE — 84484 ASSAY OF TROPONIN QUANT: CPT

## 2024-12-04 PROCEDURE — 6370000000 HC RX 637 (ALT 250 FOR IP): Performed by: STUDENT IN AN ORGANIZED HEALTH CARE EDUCATION/TRAINING PROGRAM

## 2024-12-04 PROCEDURE — 99285 EMERGENCY DEPT VISIT HI MDM: CPT

## 2024-12-04 RX ORDER — SODIUM CHLORIDE 0.9 % (FLUSH) 0.9 %
5-40 SYRINGE (ML) INJECTION EVERY 12 HOURS SCHEDULED
Status: DISCONTINUED | OUTPATIENT
Start: 2024-12-04 | End: 2024-12-05 | Stop reason: HOSPADM

## 2024-12-04 RX ORDER — GABAPENTIN 300 MG/1
300 CAPSULE ORAL DAILY
Status: DISCONTINUED | OUTPATIENT
Start: 2024-12-05 | End: 2024-12-05

## 2024-12-04 RX ORDER — FUROSEMIDE 20 MG/1
20 TABLET ORAL DAILY PRN
Status: DISCONTINUED | OUTPATIENT
Start: 2024-12-04 | End: 2024-12-05 | Stop reason: HOSPADM

## 2024-12-04 RX ORDER — PANTOPRAZOLE SODIUM 40 MG/1
40 TABLET, DELAYED RELEASE ORAL
Status: DISCONTINUED | OUTPATIENT
Start: 2024-12-05 | End: 2024-12-05 | Stop reason: HOSPADM

## 2024-12-04 RX ORDER — POLYETHYLENE GLYCOL 3350 17 G/17G
17 POWDER, FOR SOLUTION ORAL DAILY PRN
Status: DISCONTINUED | OUTPATIENT
Start: 2024-12-04 | End: 2024-12-05 | Stop reason: HOSPADM

## 2024-12-04 RX ORDER — ONDANSETRON 2 MG/ML
4 INJECTION INTRAMUSCULAR; INTRAVENOUS EVERY 6 HOURS PRN
Status: DISCONTINUED | OUTPATIENT
Start: 2024-12-04 | End: 2024-12-04

## 2024-12-04 RX ORDER — PROGESTERONE 100 MG/1
100 CAPSULE ORAL DAILY
Status: DISCONTINUED | OUTPATIENT
Start: 2024-12-05 | End: 2024-12-04 | Stop reason: SDUPTHER

## 2024-12-04 RX ORDER — DULOXETIN HYDROCHLORIDE 20 MG/1
20 CAPSULE, DELAYED RELEASE ORAL 2 TIMES DAILY
Status: DISCONTINUED | OUTPATIENT
Start: 2024-12-04 | End: 2024-12-05 | Stop reason: HOSPADM

## 2024-12-04 RX ORDER — SODIUM CHLORIDE 9 MG/ML
INJECTION, SOLUTION INTRAVENOUS PRN
Status: DISCONTINUED | OUTPATIENT
Start: 2024-12-04 | End: 2024-12-05 | Stop reason: HOSPADM

## 2024-12-04 RX ORDER — LABETALOL HYDROCHLORIDE 5 MG/ML
10 INJECTION, SOLUTION INTRAVENOUS EVERY 10 MIN PRN
Status: DISCONTINUED | OUTPATIENT
Start: 2024-12-04 | End: 2024-12-05 | Stop reason: HOSPADM

## 2024-12-04 RX ORDER — PROGESTERONE 100 MG/1
100 CAPSULE ORAL DAILY
Status: DISCONTINUED | OUTPATIENT
Start: 2024-12-05 | End: 2024-12-05 | Stop reason: HOSPADM

## 2024-12-04 RX ORDER — GABAPENTIN 300 MG/1
300 CAPSULE ORAL ONCE
Status: COMPLETED | OUTPATIENT
Start: 2024-12-04 | End: 2024-12-04

## 2024-12-04 RX ORDER — TRIAMTERENE AND HYDROCHLOROTHIAZIDE 37.5; 25 MG/1; MG/1
1 TABLET ORAL DAILY
Status: DISCONTINUED | OUTPATIENT
Start: 2024-12-05 | End: 2024-12-05 | Stop reason: HOSPADM

## 2024-12-04 RX ORDER — ONDANSETRON 4 MG/1
4 TABLET, ORALLY DISINTEGRATING ORAL EVERY 8 HOURS PRN
Status: DISCONTINUED | OUTPATIENT
Start: 2024-12-04 | End: 2024-12-05 | Stop reason: HOSPADM

## 2024-12-04 RX ORDER — MELOXICAM 7.5 MG/1
15 TABLET ORAL DAILY PRN
Status: DISCONTINUED | OUTPATIENT
Start: 2024-12-04 | End: 2024-12-05 | Stop reason: HOSPADM

## 2024-12-04 RX ORDER — ASPIRIN 81 MG/1
324 TABLET, CHEWABLE ORAL DAILY
Status: DISCONTINUED | OUTPATIENT
Start: 2024-12-04 | End: 2024-12-04

## 2024-12-04 RX ORDER — ACETAMINOPHEN 650 MG/1
650 SUPPOSITORY RECTAL EVERY 6 HOURS PRN
Status: DISCONTINUED | OUTPATIENT
Start: 2024-12-04 | End: 2024-12-05 | Stop reason: HOSPADM

## 2024-12-04 RX ORDER — TOPIRAMATE 25 MG/1
50 TABLET, FILM COATED ORAL 2 TIMES DAILY
Status: DISCONTINUED | OUTPATIENT
Start: 2024-12-04 | End: 2024-12-05 | Stop reason: HOSPADM

## 2024-12-04 RX ORDER — ASPIRIN 81 MG/1
81 TABLET, CHEWABLE ORAL DAILY
Status: DISCONTINUED | OUTPATIENT
Start: 2024-12-05 | End: 2024-12-05 | Stop reason: HOSPADM

## 2024-12-04 RX ORDER — SODIUM CHLORIDE 9 MG/ML
INJECTION, SOLUTION INTRAVENOUS CONTINUOUS
Status: DISCONTINUED | OUTPATIENT
Start: 2024-12-04 | End: 2024-12-04

## 2024-12-04 RX ORDER — CLONIDINE HYDROCHLORIDE 0.1 MG/1
0.1 TABLET ORAL
Status: DISCONTINUED | OUTPATIENT
Start: 2024-12-04 | End: 2024-12-05 | Stop reason: HOSPADM

## 2024-12-04 RX ORDER — TRAMADOL HYDROCHLORIDE 50 MG/1
50 TABLET ORAL EVERY 8 HOURS PRN
Status: DISCONTINUED | OUTPATIENT
Start: 2024-12-04 | End: 2024-12-05 | Stop reason: HOSPADM

## 2024-12-04 RX ORDER — LORAZEPAM 2 MG/ML
1 INJECTION INTRAMUSCULAR ONCE
Status: COMPLETED | OUTPATIENT
Start: 2024-12-04 | End: 2024-12-04

## 2024-12-04 RX ORDER — SODIUM CHLORIDE 0.9 % (FLUSH) 0.9 %
5-40 SYRINGE (ML) INJECTION PRN
Status: DISCONTINUED | OUTPATIENT
Start: 2024-12-04 | End: 2024-12-04

## 2024-12-04 RX ORDER — ZOLPIDEM TARTRATE 5 MG/1
10 TABLET ORAL NIGHTLY PRN
Status: DISCONTINUED | OUTPATIENT
Start: 2024-12-04 | End: 2024-12-05 | Stop reason: HOSPADM

## 2024-12-04 RX ORDER — SODIUM CHLORIDE 0.9 % (FLUSH) 0.9 %
5-40 SYRINGE (ML) INJECTION PRN
Status: DISCONTINUED | OUTPATIENT
Start: 2024-12-04 | End: 2024-12-05 | Stop reason: HOSPADM

## 2024-12-04 RX ORDER — CLOPIDOGREL BISULFATE 75 MG/1
300 TABLET ORAL
Status: COMPLETED | OUTPATIENT
Start: 2024-12-04 | End: 2024-12-04

## 2024-12-04 RX ORDER — IOPAMIDOL 755 MG/ML
100 INJECTION, SOLUTION INTRAVASCULAR
Status: COMPLETED | OUTPATIENT
Start: 2024-12-04 | End: 2024-12-04

## 2024-12-04 RX ORDER — ONDANSETRON 4 MG/1
4 TABLET, ORALLY DISINTEGRATING ORAL EVERY 8 HOURS PRN
Status: DISCONTINUED | OUTPATIENT
Start: 2024-12-04 | End: 2024-12-04

## 2024-12-04 RX ORDER — ONDANSETRON 2 MG/ML
4 INJECTION INTRAMUSCULAR; INTRAVENOUS EVERY 6 HOURS PRN
Status: DISCONTINUED | OUTPATIENT
Start: 2024-12-04 | End: 2024-12-05 | Stop reason: HOSPADM

## 2024-12-04 RX ORDER — ACETAMINOPHEN 325 MG/1
650 TABLET ORAL EVERY 6 HOURS PRN
Status: DISCONTINUED | OUTPATIENT
Start: 2024-12-04 | End: 2024-12-05 | Stop reason: HOSPADM

## 2024-12-04 RX ORDER — BUSPIRONE HYDROCHLORIDE 5 MG/1
5 TABLET ORAL 3 TIMES DAILY
Status: DISCONTINUED | OUTPATIENT
Start: 2024-12-04 | End: 2024-12-05 | Stop reason: HOSPADM

## 2024-12-04 RX ORDER — ENOXAPARIN SODIUM 100 MG/ML
30 INJECTION SUBCUTANEOUS 2 TIMES DAILY
Status: DISCONTINUED | OUTPATIENT
Start: 2024-12-04 | End: 2024-12-05 | Stop reason: HOSPADM

## 2024-12-04 RX ADMIN — IOPAMIDOL 100 ML: 755 INJECTION, SOLUTION INTRAVENOUS at 15:41

## 2024-12-04 RX ADMIN — ASPIRIN 324 MG: 81 TABLET, CHEWABLE ORAL at 17:15

## 2024-12-04 RX ADMIN — LORAZEPAM 1 MG: 2 INJECTION INTRAMUSCULAR; INTRAVENOUS at 19:57

## 2024-12-04 RX ADMIN — CLONIDINE HYDROCHLORIDE 0.1 MG: 0.1 TABLET ORAL at 21:38

## 2024-12-04 RX ADMIN — TOPIRAMATE 50 MG: 25 TABLET, FILM COATED ORAL at 21:00

## 2024-12-04 RX ADMIN — CLOPIDOGREL BISULFATE 300 MG: 75 TABLET ORAL at 17:16

## 2024-12-04 RX ADMIN — GABAPENTIN 300 MG: 300 CAPSULE ORAL at 23:00

## 2024-12-04 RX ADMIN — TIZANIDINE 4 MG: 4 TABLET ORAL at 21:38

## 2024-12-04 RX ADMIN — SODIUM CHLORIDE, PRESERVATIVE FREE 10 ML: 5 INJECTION INTRAVENOUS at 21:00

## 2024-12-04 RX ADMIN — BUSPIRONE HYDROCHLORIDE 5 MG: 5 TABLET ORAL at 21:37

## 2024-12-04 RX ADMIN — SODIUM CHLORIDE, PRESERVATIVE FREE 10 ML: 5 INJECTION INTRAVENOUS at 21:41

## 2024-12-04 RX ADMIN — DULOXETINE HYDROCHLORIDE 20 MG: 20 CAPSULE, DELAYED RELEASE ORAL at 21:37

## 2024-12-04 RX ADMIN — ENOXAPARIN SODIUM 30 MG: 100 INJECTION SUBCUTANEOUS at 21:00

## 2024-12-04 ASSESSMENT — PAIN DESCRIPTION - FREQUENCY
FREQUENCY: INTERMITTENT
FREQUENCY: INTERMITTENT

## 2024-12-04 ASSESSMENT — PAIN DESCRIPTION - LOCATION
LOCATION: BACK
LOCATION: BACK

## 2024-12-04 ASSESSMENT — ENCOUNTER SYMPTOMS
VOMITING: 0
ABDOMINAL PAIN: 0
NAUSEA: 0

## 2024-12-04 ASSESSMENT — PAIN DESCRIPTION - DESCRIPTORS
DESCRIPTORS: ACHING
DESCRIPTORS: ACHING

## 2024-12-04 ASSESSMENT — PAIN DESCRIPTION - ORIENTATION
ORIENTATION: MID
ORIENTATION: MID

## 2024-12-04 ASSESSMENT — PAIN DESCRIPTION - PAIN TYPE
TYPE: ACUTE PAIN
TYPE: ACUTE PAIN

## 2024-12-04 ASSESSMENT — LIFESTYLE VARIABLES
HOW MANY STANDARD DRINKS CONTAINING ALCOHOL DO YOU HAVE ON A TYPICAL DAY: PATIENT DOES NOT DRINK
HOW OFTEN DO YOU HAVE A DRINK CONTAINING ALCOHOL: NEVER

## 2024-12-04 ASSESSMENT — PAIN SCALES - GENERAL
PAINLEVEL_OUTOF10: 5
PAINLEVEL_OUTOF10: 5
PAINLEVEL_OUTOF10: 0

## 2024-12-04 ASSESSMENT — PAIN DESCRIPTION - ONSET
ONSET: GRADUAL
ONSET: GRADUAL

## 2024-12-04 NOTE — H&P
level of the larynx and carotid bifurcations.  Carotid bulbs without significant luminal narrowing. There is 0% stenosis of the right carotid artery. There is 0% stenosis of the left carotid artery. There is a left vertebral dominant vertebrobasilar arterial system. The cervical vertebral arteries are normal in course, size and contour without significant stenosis. Partially imaged dilated main pulmonary artery measuring at least 3.8 cm in diameter. OTHERS: No evidence of intracranial mass or abnormal enhancement on delayed phase images. Visualized paranasal sinuses and mastoids are patent. Status post bilateral lens replacement. The orbits are otherwise unremarkable. Dental amalgam associated metallic artifact precludes optimal evaluation of the adjacent structures, oral cavity and oropharynx. Visualized soft tissues of the skull base and neck are unremarkable. Visualized lung apices are clear. No acute fracture or aggressive osseous lesion. Multilevel degenerative changes of the cervical spine.     1. CTA head demonstrates no large vessel occlusion. Nonspecific mild to moderate proximal stenosis of the right MCA may suggest vasospasm versus noncalcified plaques. No intracranial aneurysm. 2. CTA neck demonstrates no large vessel occlusion, high-grade stenosis or aneurysm. Others: 3. No obvious intracranial or cervical mass or enhancing lesion. Electronically signed by SUNNY MORENO     _______________________________________________________________________    TOTAL TIME:  76 Minutes    Critical Care Provided     Minutes non procedure based    Signed: Larissa Mccullough MD    Procedures: see electronic medical records for all procedures/Xrays and details which were not copied into this note but were reviewed prior to creation of Plan.

## 2024-12-04 NOTE — ED NOTES
Patient call bell light answered, patient is tearful, stating the numbness had returned to the L side of her face, L arm, L leg, and felt like her smile was drooping. NIH reassessed.

## 2024-12-04 NOTE — ED NOTES
This RN still at bedside evaluating patient, she reports the sensation has improved. Appears more calm now. BP remained the same, no other s/s reported.

## 2024-12-04 NOTE — ED PROVIDER NOTES
known as: AMBIEN            2. @[facility] [place]@  3.  Return to ED if worse     Diagnosis     Clinical Impression: No diagnosis found.    Attestations:    Donya Claros, DO    Please note that this dictation was completed with PEAK-IT, the computer voice recognition software.  Quite often unanticipated grammatical, syntax, homophones, and other interpretive errors are inadvertently transcribed by the computer software.  Please disregard these errors.  Please excuse any errors that have escaped final proofreading.  Thank you.          I personally saw and examined the patient. I have reviewed and agree with the resident’s findings, including all diagnostic interpretations, and plans as written. I was present during the key portions of separately billed procedures.     DO Tarik Ding Nicholet L, DO  12/05/24 [id number]

## 2024-12-04 NOTE — PROGRESS NOTES
MRI ON HOLD - TELEMETRY ORDERS AND SCREENING SHEET    Please complete screening and sign electronically or fax to [phone].    Telemetry order must be changed to allow the patient to leave the unit without telemetry.    Telemetry box cannot come to MRI with the patient.    Please call MRI at [phone] when this has been done.    If this patient needs monitored while off the unit, please call MRI at [phone] to coordinate a time for an RN to accompany the patient to MRI.

## 2024-12-05 ENCOUNTER — APPOINTMENT (OUTPATIENT)
Facility: HOSPITAL | Age: 64
End: 2024-12-05
Attending: INTERNAL MEDICINE
Payer: COMMERCIAL

## 2024-12-05 VITALS
HEART RATE: 73 BPM | TEMPERATURE: 97.5 F | SYSTOLIC BLOOD PRESSURE: 133 MMHG | OXYGEN SATURATION: 96 % | BODY MASS INDEX: 36.42 KG/M2 | RESPIRATION RATE: 18 BRPM | WEIGHT: 240.3 LBS | DIASTOLIC BLOOD PRESSURE: 54 MMHG | HEIGHT: 68 IN

## 2024-12-05 PROBLEM — R56.9 SEIZURE-LIKE ACTIVITY (HCC): Status: ACTIVE | Noted: 2024-12-05

## 2024-12-05 LAB
ALBUMIN SERPL-MCNC: 3.1 G/DL (ref 3.5–5)
ALBUMIN/GLOB SERPL: 0.9 (ref 1.1–2.2)
ALP SERPL-CCNC: 46 U/L (ref 45–117)
ALT SERPL-CCNC: 22 U/L (ref 12–78)
ANION GAP SERPL CALC-SCNC: 6 MMOL/L (ref 2–12)
AST SERPL-CCNC: 14 U/L (ref 15–37)
BASOPHILS # BLD: 0 K/UL (ref 0–0.1)
BASOPHILS NFR BLD: 1 % (ref 0–1)
BILIRUB SERPL-MCNC: 0.7 MG/DL (ref 0.2–1)
BUN SERPL-MCNC: 16 MG/DL (ref 6–20)
BUN/CREAT SERPL: 31 (ref 12–20)
CALCIUM SERPL-MCNC: 8.8 MG/DL (ref 8.5–10.1)
CHLORIDE SERPL-SCNC: 113 MMOL/L (ref 97–108)
CO2 SERPL-SCNC: 21 MMOL/L (ref 21–32)
CREAT SERPL-MCNC: 0.51 MG/DL (ref 0.55–1.02)
DIFFERENTIAL METHOD BLD: NORMAL
ECHO BSA: 2.29 M2
EKG ATRIAL RATE: 78 BPM
EKG DIAGNOSIS: NORMAL
EKG P AXIS: 60 DEGREES
EKG P-R INTERVAL: 186 MS
EKG Q-T INTERVAL: 432 MS
EKG QRS DURATION: 156 MS
EKG QTC CALCULATION (BAZETT): 492 MS
EKG R AXIS: 69 DEGREES
EKG T AXIS: -77 DEGREES
EKG VENTRICULAR RATE: 78 BPM
EOSINOPHIL # BLD: 0.2 K/UL (ref 0–0.4)
EOSINOPHIL NFR BLD: 4 % (ref 0–7)
ERYTHROCYTE [DISTWIDTH] IN BLOOD BY AUTOMATED COUNT: 13.3 % (ref 11.5–14.5)
GLOBULIN SER CALC-MCNC: 3.3 G/DL (ref 2–4)
GLUCOSE SERPL-MCNC: 91 MG/DL (ref 65–100)
HCT VFR BLD AUTO: 36.3 % (ref 35–47)
HGB BLD-MCNC: 11.8 G/DL (ref 11.5–16)
IMM GRANULOCYTES # BLD AUTO: 0 K/UL (ref 0–0.04)
IMM GRANULOCYTES NFR BLD AUTO: 0 % (ref 0–0.5)
LYMPHOCYTES # BLD: 1.6 K/UL (ref 0.8–3.5)
LYMPHOCYTES NFR BLD: 33 % (ref 12–49)
MCH RBC QN AUTO: 29.1 PG (ref 26–34)
MCHC RBC AUTO-ENTMCNC: 32.5 G/DL (ref 30–36.5)
MCV RBC AUTO: 89.6 FL (ref 80–99)
MONOCYTES # BLD: 0.5 K/UL (ref 0–1)
MONOCYTES NFR BLD: 10 % (ref 5–13)
NEUTS SEG # BLD: 2.5 K/UL (ref 1.8–8)
NEUTS SEG NFR BLD: 52 % (ref 32–75)
NRBC # BLD: 0 K/UL (ref 0–0.01)
NRBC BLD-RTO: 0 PER 100 WBC
PLATELET # BLD AUTO: 197 K/UL (ref 150–400)
PMV BLD AUTO: 10.3 FL (ref 8.9–12.9)
POTASSIUM SERPL-SCNC: 3.8 MMOL/L (ref 3.5–5.1)
PROT SERPL-MCNC: 6.4 G/DL (ref 6.4–8.2)
RBC # BLD AUTO: 4.05 M/UL (ref 3.8–5.2)
SODIUM SERPL-SCNC: 140 MMOL/L (ref 136–145)
WBC # BLD AUTO: 4.8 K/UL (ref 3.6–11)

## 2024-12-05 PROCEDURE — 95816 EEG AWAKE AND DROWSY: CPT | Performed by: PSYCHIATRY & NEUROLOGY

## 2024-12-05 PROCEDURE — 36415 COLL VENOUS BLD VENIPUNCTURE: CPT

## 2024-12-05 PROCEDURE — 97165 OT EVAL LOW COMPLEX 30 MIN: CPT

## 2024-12-05 PROCEDURE — G0378 HOSPITAL OBSERVATION PER HR: HCPCS

## 2024-12-05 PROCEDURE — 6370000000 HC RX 637 (ALT 250 FOR IP): Performed by: PSYCHIATRY & NEUROLOGY

## 2024-12-05 PROCEDURE — 2580000003 HC RX 258: Performed by: STUDENT IN AN ORGANIZED HEALTH CARE EDUCATION/TRAINING PROGRAM

## 2024-12-05 PROCEDURE — 85025 COMPLETE CBC W/AUTO DIFF WBC: CPT

## 2024-12-05 PROCEDURE — 97116 GAIT TRAINING THERAPY: CPT

## 2024-12-05 PROCEDURE — 6360000002 HC RX W HCPCS: Performed by: STUDENT IN AN ORGANIZED HEALTH CARE EDUCATION/TRAINING PROGRAM

## 2024-12-05 PROCEDURE — 95816 EEG AWAKE AND DROWSY: CPT

## 2024-12-05 PROCEDURE — 80053 COMPREHEN METABOLIC PANEL: CPT

## 2024-12-05 PROCEDURE — 6370000000 HC RX 637 (ALT 250 FOR IP): Performed by: INTERNAL MEDICINE

## 2024-12-05 PROCEDURE — 99222 1ST HOSP IP/OBS MODERATE 55: CPT | Performed by: PSYCHIATRY & NEUROLOGY

## 2024-12-05 PROCEDURE — 93270 REMOTE 30 DAY ECG REV/REPORT: CPT

## 2024-12-05 PROCEDURE — 6370000000 HC RX 637 (ALT 250 FOR IP): Performed by: STUDENT IN AN ORGANIZED HEALTH CARE EDUCATION/TRAINING PROGRAM

## 2024-12-05 PROCEDURE — 97162 PT EVAL MOD COMPLEX 30 MIN: CPT

## 2024-12-05 RX ORDER — CLOPIDOGREL BISULFATE 75 MG/1
75 TABLET ORAL DAILY
Qty: 21 TABLET | Refills: 0 | Status: SHIPPED | OUTPATIENT
Start: 2024-12-06 | End: 2024-12-27

## 2024-12-05 RX ORDER — ATORVASTATIN CALCIUM 40 MG/1
80 TABLET, FILM COATED ORAL NIGHTLY
Status: DISCONTINUED | OUTPATIENT
Start: 2024-12-05 | End: 2024-12-05 | Stop reason: HOSPADM

## 2024-12-05 RX ORDER — CLOPIDOGREL BISULFATE 75 MG/1
75 TABLET ORAL DAILY
Status: DISCONTINUED | OUTPATIENT
Start: 2024-12-05 | End: 2024-12-05 | Stop reason: HOSPADM

## 2024-12-05 RX ORDER — GABAPENTIN 600 MG/1
TABLET ORAL
Qty: 30 TABLET | Refills: 0 | Status: SHIPPED
Start: 2024-12-05 | End: 2025-01-05

## 2024-12-05 RX ORDER — GABAPENTIN 300 MG/1
1200 CAPSULE ORAL NIGHTLY
Status: DISCONTINUED | OUTPATIENT
Start: 2024-12-05 | End: 2024-12-05 | Stop reason: HOSPADM

## 2024-12-05 RX ORDER — GABAPENTIN 300 MG/1
900 CAPSULE ORAL
Status: DISCONTINUED | OUTPATIENT
Start: 2024-12-06 | End: 2024-12-05 | Stop reason: HOSPADM

## 2024-12-05 RX ORDER — CASTOR OIL AND BALSAM, PERU 788; 87 MG/G; MG/G
OINTMENT TOPICAL 2 TIMES DAILY
Status: DISCONTINUED | OUTPATIENT
Start: 2024-12-05 | End: 2024-12-05 | Stop reason: HOSPADM

## 2024-12-05 RX ORDER — ATORVASTATIN CALCIUM 40 MG/1
40 TABLET, FILM COATED ORAL NIGHTLY
Qty: 30 TABLET | Refills: 1 | Status: SHIPPED | OUTPATIENT
Start: 2024-12-05

## 2024-12-05 RX ADMIN — ESTROGENS, CONJUGATED 0.62 MG: 0.62 TABLET, FILM COATED ORAL at 09:53

## 2024-12-05 RX ADMIN — CLOPIDOGREL BISULFATE 75 MG: 75 TABLET ORAL at 13:43

## 2024-12-05 RX ADMIN — TRAMADOL HYDROCHLORIDE 50 MG: 50 TABLET, COATED ORAL at 05:16

## 2024-12-05 RX ADMIN — SODIUM CHLORIDE, PRESERVATIVE FREE 10 ML: 5 INJECTION INTRAVENOUS at 09:53

## 2024-12-05 RX ADMIN — ASPIRIN 81 MG: 81 TABLET, CHEWABLE ORAL at 09:52

## 2024-12-05 RX ADMIN — Medication: at 10:18

## 2024-12-05 RX ADMIN — GABAPENTIN 300 MG: 300 CAPSULE ORAL at 09:52

## 2024-12-05 RX ADMIN — BUSPIRONE HYDROCHLORIDE 5 MG: 5 TABLET ORAL at 13:43

## 2024-12-05 RX ADMIN — TOPIRAMATE 50 MG: 25 TABLET, FILM COATED ORAL at 09:52

## 2024-12-05 RX ADMIN — DULOXETINE HYDROCHLORIDE 20 MG: 20 CAPSULE, DELAYED RELEASE ORAL at 09:52

## 2024-12-05 RX ADMIN — SODIUM CHLORIDE, PRESERVATIVE FREE 10 ML: 5 INJECTION INTRAVENOUS at 09:51

## 2024-12-05 RX ADMIN — TRIAMTERENE AND HYDROCHLOROTHIAZIDE 1 TABLET: 37.5; 25 TABLET ORAL at 09:52

## 2024-12-05 RX ADMIN — PANTOPRAZOLE SODIUM 40 MG: 40 TABLET, DELAYED RELEASE ORAL at 05:17

## 2024-12-05 RX ADMIN — ENOXAPARIN SODIUM 30 MG: 100 INJECTION SUBCUTANEOUS at 09:50

## 2024-12-05 RX ADMIN — BUSPIRONE HYDROCHLORIDE 5 MG: 5 TABLET ORAL at 09:53

## 2024-12-05 ASSESSMENT — PAIN DESCRIPTION - LOCATION: LOCATION: BACK

## 2024-12-05 ASSESSMENT — PAIN DESCRIPTION - ONSET: ONSET: GRADUAL

## 2024-12-05 ASSESSMENT — PAIN DESCRIPTION - ORIENTATION: ORIENTATION: MID

## 2024-12-05 ASSESSMENT — PAIN DESCRIPTION - PAIN TYPE: TYPE: ACUTE PAIN

## 2024-12-05 ASSESSMENT — PAIN DESCRIPTION - FREQUENCY: FREQUENCY: INTERMITTENT

## 2024-12-05 ASSESSMENT — PAIN DESCRIPTION - DESCRIPTORS: DESCRIPTORS: ACHING

## 2024-12-05 ASSESSMENT — PAIN - FUNCTIONAL ASSESSMENT: PAIN_FUNCTIONAL_ASSESSMENT: ACTIVITIES ARE NOT PREVENTED

## 2024-12-05 ASSESSMENT — PAIN SCALES - GENERAL: PAINLEVEL_OUTOF10: 4

## 2024-12-05 NOTE — PROGRESS NOTES
Occupational Therapy    Orders received for OT evaluation. Chart reviewed and attempted to see patient however EEG in progress so will defer at this time and follow up as able.    Tracie Gurrola, OTR/L

## 2024-12-05 NOTE — PROGRESS NOTES
Nursing contacted Nocturnist/cross cover provider via non-urgent messaging system [employer] and notified patient asking for gabapentin 600mg states takes at home chronically, appears 300mg daily her most recent external fills, which was what was rx by day  for continued. states pt also asking for melatonin, has ambien- awaiting clarification from nurse if pt does not want ambien would consider melatonin. nad reported. pt admitted with cva r/o sx poa- no concerns of this at present.. No other concerns reported. No acute distress reported. No other information provided by nurse. VSS.     Ordered gabapentin 300mg po x1- nurse to d/w  further in am. possibly dosage was also decreased due to presenting sx poa. . Will defer further evaluation/management to the day shift primary attending care team. Patient denies any further complaints or concerns.     Nursing to notify Hospitalist for further/continued concerns. Will remain available overnight for further concerns if nursing/patient needs. Please note, there are RRT systems in this hospital in place that if nursing has acute or critical patient condition change or concern, this is to help facilitate and notify that patient needs immediate bedside evaluation by a provider.     Non-billable note.

## 2024-12-05 NOTE — PROGRESS NOTES
Orders received, chart reviewed and patient evaluated by physical therapy. Pending progression with skilled acute physical therapy, recommend:    No skilled physical therapy    Pt is functioning at her baseline independent level and is safe to mobilize with supervision/standby assist from nursing.     Full evaluation to follow.

## 2024-12-05 NOTE — PROCEDURES
EEG REPORT    Patient Name: Sheila Prescott  : 1960  Age: 63 y.o.    Ordering physician: Dr. Velazco  Date of EE2024  8:33 - 8:56  Diagnosis: seizure like activity  Interpreting physician: Sanford Connors D.O. FAAN    Procedure: EEG    CLINICAL INDICATION: The patient is a 63 y.o. female who is being evaluated for baseline electro cerebral activities and to rule out seizure focus.      Current Facility-Administered Medications   Medication Dose Route Frequency    balsum peru-castor oil (VENELEX) ointment   Topical BID    atorvastatin (LIPITOR) tablet 80 mg  80 mg Oral Nightly    clopidogrel (PLAVIX) tablet 75 mg  75 mg Oral Daily    aspirin chewable tablet 81 mg  81 mg Oral Daily    busPIRone (BUSPAR) tablet 5 mg  5 mg Oral TID    cloNIDine (CATAPRES) tablet 0.1 mg  0.1 mg Oral QHS    DULoxetine (CYMBALTA) extended release capsule 20 mg  20 mg Oral BID    estrogens (conjugated) (PREMARIN) tablet 0.625 mg  0.625 mg Oral Daily    furosemide (LASIX) tablet 20 mg  20 mg Oral Daily PRN    gabapentin (NEURONTIN) capsule 300 mg  300 mg Oral Daily    meloxicam (MOBIC) tablet 15 mg  15 mg Oral Daily PRN    pantoprazole (PROTONIX) tablet 40 mg  40 mg Oral QAM AC    tiZANidine (ZANAFLEX) tablet 4 mg  4 mg Oral QHS    topiramate (TOPAMAX) tablet 50 mg  50 mg Oral BID    traMADol (ULTRAM) tablet 50 mg  50 mg Oral Q8H PRN    triamterene-hydroCHLOROthiazide (MAXZIDE-25) 37.5-25 MG per tablet 1 tablet  1 tablet Oral Daily    zolpidem (AMBIEN) tablet 10 mg  10 mg Oral Nightly PRN    sodium chloride flush 0.9 % injection 5-40 mL  5-40 mL IntraVENous 2 times per day    sodium chloride flush 0.9 % injection 5-40 mL  5-40 mL IntraVENous PRN    0.9 % sodium chloride infusion   IntraVENous PRN    enoxaparin Sodium (LOVENOX) injection 30 mg  30 mg SubCUTAneous BID    ondansetron (ZOFRAN-ODT) disintegrating tablet 4 mg  4 mg Oral Q8H PRN    Or    ondansetron (ZOFRAN) injection 4 mg  4 mg IntraVENous Q6H PRN     Mash at the injection site of chemo treatment, left upper arm/rash

## 2024-12-05 NOTE — DISCHARGE INSTRUCTIONS
HOSPITALIST DISCHARGE INSTRUCTIONS    NAME: Sheila Prescott   :  1960   MRN:  [id number]     Date/Time:  2024 2:08 PM    ADMIT DATE: 2024     DISCHARGE DATE: 2024     DISCHARGE DIAGNOSIS:  TIA causing Left-sided face, arm, leg numbness POA- resolved now,  cont ASA, added plavix x 21 days, cont statin as recommended by neurology , event monitor recommended on discharge  Suspected CVA POA- ruled out with neg MRI  Fibromyalgia POA- severe, on Gabapentin 900 mg AM + 1200mg PM (per pt's rheumatologist in [place]) dose confirmed by pt by showing prescription bottle brought in by family  Chronic pain syndrome  Neuropathy  Mood disorder  HTN  GERD  Full code    MEDICATIONS:  As per medication reconciliation  list  It is important that you take the medication exactly as they are prescribed.   Keep your medication in the bottles provided by the pharmacist and keep a list of the medication names, dosages, and times to be taken in your wallet.   Do not take other medications without consulting your doctor.     Pain Management: per above medications    What to do at Home    Recommended diet:  cardiac diet and low fat, low cholesterol diet    Recommended activity: activity as tolerated    If you have questions regarding the hospital related prescriptions or hospital related issues please call at .    If you experience any of the following symptoms then please call your primary care physician or return to the emergency room if you cannot get hold of your doctor:  Fever, chills, nausea, vomiting, diarrhea, change in mentation, falling, bleeding, shortness of breath,     Follow Up:  PCP you are to call and set up an appointment to see them in 7-10 days.  [facility] in 4 weeks as recommended and scheduled    Information obtained by :  I understand that if any problems occur once I am at home I am to contact my physician.    I understand and acknowledge receipt of the

## 2024-12-05 NOTE — PROGRESS NOTES
Speech Pathology Note    Reviewed chart and note SLP orders received as part of stroke order set. Pertinent medical information below. Discussed case with RN who reported no SLP-related concerns, Introduced self and role of SLP to patient, Patient denied any decline in motor speech, language, cognitive, or swallowing function, and Patient reported currently at baseline    Given pertinent medical information as below, discussion with RN, and discussion with patient, formal SLP evaluation not clinically indicated at this time. Will sign off. Please re-consult if further needs arise. Thank you.    Chief Complaint   Patient presents with    Extremity Weakness     Pt reports she at movies about 30 minutes ago and experienced L sided facial droop, arm and leg numbness and tingling - only current remaining symptom is L arm/hand feeling numb, recent TIA work up over the weekend     Presenting symptoms: L side numbness    RN flowsheet documentation   BERTHA NURSING DYSPHAGIA SCREEN NIHSS   Swallow Screening  Is the patient unable to remain alert for testing?: No  Is the patient on a modified diet (thickened liquids) due to pre-existing dysphagia?: No  Is there presence of existing enteral tube feeding via the stomach or nose?: No  Is there presence of head-of-bed restrictions (less than 30 degrees)?: No  Is there presence of tracheotomy tube?: No  Is the patient ordered nothing-by-mouth status?: No  3 oz Water Swallow Screen: Pass      Current diet:ADULT DIET; Regular NIHSS Stroke Scale  NIH Stroke Scale Assessed: Yes  Interval: Hand-off/Transfer  Level of Consciousness (1a): Alert  LOC Questions (1b): Answers both correctly  LOC Commands (1c): Performs both tasks correctly  Best Gaze (2): Normal  Visual (3): No visual loss  Facial Palsy (4): Normal symmetrical movement  Motor Arm, Left (5a): No drift  Motor Arm, Right (5b): No drift  Motor Leg, Left (6a): No drift  Motor Leg, Right (6b): No drift  Limb Ataxia (7):

## 2024-12-05 NOTE — PROGRESS NOTES
PHYSICAL THERAPY EVALUATION/DISCHARGE    Patient: Sheila Prescott (63 y.o. female)  Date: 12/5/2024  Primary Diagnosis: Left facial numbness [R20.0]       Precautions:                        ASSESSMENT AND RECOMMENDATIONS:  Based on the objective data below, the patient presents with resolved L facial numbness, good strength, intact balance, and overall baseline independent functional mobility. Gait steady overall with no LOB/balance deficits as pt independently ambulated ~200ft. Balance remained intact as pt performed Carballo Balance Test, scoring 54/56, indicating low falls risk. Pt has no further skilled therapy needs and is safe to return home w/ no additional needs.     Functional Outcome Measure:  The patient scored 54/56 on the Carballo Balance Test outcome measure which is indicative of low falls risk.          Further skilled acute physical therapy is not indicated at this time.       PLAN :  Recommendation for discharge: (in order for the patient to meet his/her long term goals):   No skilled physical therapy    Other factors to consider for discharge:  recurrent L facial numbness    IF patient discharges home will need the following DME: none       SUBJECTIVE:   Patient stated “I know, I know, I'm back. But I remembered that BEFAST you told me about.”    OBJECTIVE DATA SUMMARY:     Past Medical History:   Diagnosis Date    Fibromyalgia     Hypercholesterolemia     Hypertension     Kidney stones     passed 72 stones    Other ill-defined conditions(599.89)     kidney stones     Past Surgical History:   Procedure Laterality Date    OTHER SURGICAL HISTORY      lung biopsy       Home Situation and Prior Level of Function: Independent w/ ambulation and ADLs. Denies history of falls. Still driving. Works full-time  Social/Functional History  Lives With: Other (comment), Family  Type of Home: House  Home Layout: One level  Home Access: Stairs to enter with rails  Entrance Stairs - Number of Steps: 2  Bathroom

## 2024-12-05 NOTE — PROGRESS NOTES
OCCUPATIONAL THERAPY EVALUATION/DISCHARGE  Patient: Sheila Prescott (63 y.o. female)  Date: 12/5/2024  Primary Diagnosis: Left facial numbness [R20.0]       Precautions: Up with assistance    ASSESSMENT :  Based on the objective data below, the patient is currently at her baseline. She is independent without device for self care toileting, item retrieval and related mobility and her strength/coordination and sensation is WNL in bilateral UE. Patient is aware of [facility] signs/symptoms of CVA and indicates that she will return to ED if she experiences any future signs/symptoms. She notes she is aware that driving herself to the hospital is not recommended.    Functional Outcome Measure:  The patient scored 66 on the Fugl-Ovalles outcome measure which is indicative of normal UE function.      Further skilled acute occupational therapy is not indicated at this time.     PLAN :  Recommend with staff: up to bathroom without device    Recommendation for discharge: (in order for the patient to meet his/her long term goals):   No skilled occupational therapy    Other factors to consider for discharge: no additional factors    IF patient discharges home will need the following DME: none     SUBJECTIVE:   Patient stated, “I remembered the BEFAST acronym and knew I needed to come in.”    OBJECTIVE DATA SUMMARY:     Past Medical History:   Diagnosis Date    Fibromyalgia     Hypercholesterolemia     Hypertension     Kidney stones     passed 72 stones    Other ill-defined conditions(919.89)     kidney stones     Past Surgical History:   Procedure Laterality Date    OTHER SURGICAL HISTORY      lung biopsy       Prior Level of Function/Environment/Context: Lives with family in a one level house. Was independent with all self care and related mobility, including driving, prior.  , Prior Level of Assist for ADLs: Independent,  ,  ,  ,  ,  , Prior Level of Assist for Homemaking: Independent, Ambulation Assistance: Independent, Prior

## 2024-12-05 NOTE — DISCHARGE SUMMARY
Discharge Summary    Name: Sheila Prescott  [id number]  YOB: 1960 (Age: 63 y.o.)   Date of Admission: 12/4/2024  Date of Discharge: 12/5/2024  Attending Physician: Yusuf Velazco MD    Discharge Diagnosis:   TIA causing Left-sided face, arm, leg numbness POA- resolved now,  cont ASA, added plavix x 21 days, cont statin as recommended by neurology , event monitor recommended on discharge  Suspected CVA POA- ruled out with neg MRI  Fibromyalgia POA- severe, on Gabapentin 900 mg AM + 1200mg PM (per pt's rheumatologist in [place]) dose confirmed by pt by showing prescription bottle brought in by family  Chronic pain syndrome  Neuropathy  Mood disorder  HTN  GERD  Full code    Consultations:  IP CONSULT TO TELE-NEUROLOGY  IP CONSULT TO NEUROLOGY  IP CONSULT TO CASE MANAGEMENT  IP CONSULT TO STROKE COORDINATOR      Brief Admission History/Reason for Admission Per Larissa Mccullough MD:   \"63 y.o.  female with PMHx significant for fibromyalgia, hyperlipidemia, hypertension, and history of kidney stones presenting for left sided numbness.     Patient reported that she was just discharged 2 days ago for similar symptoms.  She reports that she experienced left-sided weakness this morning and the episode was very short-lived so she decided to go to the movies to try to relax.  At the movies she felt that the left side of her face was drooping.  She moved to the bathroom where she noted that she felt like she could not move her left leg as usual secondary to weakness.  She tried looking at her phone and noted blurry vision.  She googled/remembered the face acronym and decided to drive to the emergency department for evaluation.  She denies fever, chills, and shortness of breath.     We were asked to admit for work up and evaluation of the above problem\"    Brief Hospital Course by Main Problems:   Left-sided face numbness  Left arm numbness  Left leg numbness  Stroke ruled

## 2024-12-05 NOTE — PROGRESS NOTES
End of Shift Note    Bedside shift change report given to FABIENNE Williamson (oncoming nurse) by Gena Silvestre RN .        Shift worked:  NIGHTS   Shift summary and any significant changes:     -New to unit     -pending neuro,PT/OT/SP    -pains meds given as ordered     Concerns for physician to address:  See above   Zone phone for oncoming shift:   [phone]     Patient Information  Sheila Prescott  63 y.o.  12/4/2024  3:10 PM by Larissa Mccullough MD. Sheila Prescott was admitted from [facility]    Problem List  Patient Active Problem List    Diagnosis Date Noted    Left facial numbness 12/04/2024    TIA (transient ischemic attack) 12/01/2024    Obesity, morbid 12/11/2020    Anxiety 10/29/2020    Pneumonia due to COVID-19 virus 10/23/2020    Acute respiratory failure with hypoxia 10/23/2020    Elevated LFTs 10/23/2020    JUAN PABLO on CPAP 10/23/2020    Hypokalemia 10/23/2020    Xerostomia 05/10/2020    Renal stones 10/28/2014    Fibromyalgia 10/28/2014     Past Medical History:   Diagnosis Date    Fibromyalgia     Hypercholesterolemia     Hypertension     Kidney stones     passed 72 stones    Other ill-defined conditions(799.89)     kidney stones       Core Measures:  CVA: yes  CHF: no  PNA: no    Activity:Level of Assistance: Independent  Number times ambulated in hallways past shift: 0  Number of times OOB to chair past shift: 0    Cardiac:   Cardiac Monitoring: yes    Access:   Current line(s): PIV    Respiratory:   O2 Device: None (Room air)    GI:     Current diet:  ADULT DIET; Regular  Tolerating current diet: Yes    Pain Management:   Patient states pain is manageable on current regimen: yes    Skin:  James Scale Score: 21  Interventions: N/A  Pressure injury: yes     Patient Safety:  Fall Score: Guerin Total Score: 15  Interventions: bed alarm  Self-release roll belt: No  Dexterity to release roll belt: N/A   (must document dexterity  here by stating Yes or No here, otherwise this is a restraint and must follow

## 2024-12-05 NOTE — CARE COORDINATION
Care Management Initial Assessment       RUR: 9% (low RUR)   Readmission? Yes - 12/01-12/02  1st IM letter given? No - Commercial   1st  letter given: No    CM met with pt at bedside. CM introduced self and role and completed initial assessment. CM verified demographic and clinical information.     PCP is Dr. Montanez, based in [place]. Patient will schedule herself. , preferred [facility].     Pt lives with sister and brother in law in a 1 level home, 2 [address]. Reports being independent in ADLs. Denies DME at home. Reports CPAP at home. Reports they are supported by their family. Patient is an active . Denies history of HH/IPR/SNF.     Pt plans to discharge home and anticipates sister or brother in law to assist with transportation. PT/OT to see, CM will follow for recs.     Of note, patient highlighted that she was able to use her [employer] education provided during previous admission. Patient was grateful to have that education.     CM will continue to follow.     1355 - Clear from  for d/c.      12/05/24 0843   Service Assessment   Patient Orientation Alert and Oriented;Person;Place;Situation;Self   Cognition Alert   History Provided By Patient   Primary Caregiver Self   Support Systems Family Members   Patient's Healthcare Decision Maker is: Patient Declined (Legal Next of Kin Remains as Decision Maker)   PCP Verified by CM Yes  (In [place])   Last Visit to PCP Within last 3 months   Prior Functional Level Independent in ADLs/IADLs   Current Functional Level Independent in ADLs/IADLs   Can patient return to prior living arrangement Yes   Ability to make needs known: Good   Family able to assist with home care needs: Yes   Would you like for me to discuss the discharge plan with any other family members/significant others, and if so, who? No  (Pt to contact)   Social/Functional History   Lives With Other (comment);Family  (Sister and brother in law)   Type of Home House   Home Layout

## 2024-12-05 NOTE — CONSULTS
Neurology Note    Patient ID:  Sheila Prescott  [id number]  63 y.o.  1960      Date of Consultation:  December 5, 2024    Referring Physician: Dr. Mccullough    Reason for Consultation:  left sided numbness      Assessment and Plan:    The patient is a pleasant 63-year-old female who presents to the hospital with transient left-sided sensory deficit.  Her current neurological examination reveals no focal sensory or motor deficit.        Transient neurological symptoms  Recurrence of symptoms since discharge, now resolved again  Head CT with no acute abnormality  Brain MRI with no evidence of an acute stroke.  There is mild chronic microvascular ischemic disease  CTA reveals no large vessel stenosis.  There was mild intracranial stenosis  This would be most consistent with a TIA.  Associated with anxiety/panic attack cannot be completely excluded  Risk factors for stroke do include hypertension and dyslipidemia  Patient was on 81 mg aspirin  Will add plavix 75 mg daily for 21 days. Discussed rationale with patient  Dyslipidemia: .  Goal LDL is less than 70.  Patient should be on intensive statin therapy. It appears that she was not discharged on statin during last hospital stay.    Hemoglobin A1c at goal  Eeg with no seizure focus.  Should get event monitor at discharge due to second episode  Hypertension: Aggressive control with goal blood pressure of less than 140/90    Patient did have an echocardiogram reportedly within the last year.  She will look to obtain those results. It not obtained, she will need one looking for pfo.  Can be performed as outpatient    I provided tia/stroke education today in regards to risk factors for stroke and lifestyle modifications to help minimize the risk of future stroke.  This included medication compliance, regular follow up with primary care physician,  and healthy lifestyle habits (nutrition/exercise)        Fibromyalgia  Depression  Reflux disease  Chronic low

## 2025-01-10 LAB — ECHO BSA: 2.29 M2

## 2025-01-13 LAB — ECHO BSA: 2.29 M2

## 2025-01-17 ENCOUNTER — HOSPITAL ENCOUNTER (OUTPATIENT)
Facility: HOSPITAL | Age: 65
Discharge: HOME | End: 2025-01-20
Payer: COMMERCIAL

## 2025-01-17 DIAGNOSIS — M54.16 LUMBAR RADICULITIS: ICD-10-CM

## 2025-01-17 PROCEDURE — 72148 MRI LUMBAR SPINE W/O DYE: CPT

## 2025-06-22 ENCOUNTER — HOSPITAL ENCOUNTER (INPATIENT)
Facility: HOSPITAL | Age: 65
LOS: 6 days | Discharge: HOME | End: 2025-06-28
Attending: EMERGENCY MEDICINE | Admitting: STUDENT IN AN ORGANIZED HEALTH CARE EDUCATION/TRAINING PROGRAM
Payer: COMMERCIAL

## 2025-06-22 ENCOUNTER — APPOINTMENT (OUTPATIENT)
Facility: HOSPITAL | Age: 65
End: 2025-06-22
Payer: COMMERCIAL

## 2025-06-22 DIAGNOSIS — I24.9 ACUTE CORONARY SYNDROME (HCC): Primary | ICD-10-CM

## 2025-06-22 DIAGNOSIS — J96.01 ACUTE RESPIRATORY FAILURE WITH HYPOXIA (HCC): ICD-10-CM

## 2025-06-22 DIAGNOSIS — J18.9 PNEUMONIA OF BOTH LUNGS DUE TO INFECTIOUS ORGANISM, UNSPECIFIED PART OF LUNG: ICD-10-CM

## 2025-06-22 DIAGNOSIS — I21.4 NSTEMI (NON-ST ELEVATED MYOCARDIAL INFARCTION) (HCC): ICD-10-CM

## 2025-06-22 LAB
ALBUMIN SERPL-MCNC: 3.1 G/DL (ref 3.5–5)
ALBUMIN/GLOB SERPL: 0.9 (ref 1.1–2.2)
ALP SERPL-CCNC: 62 U/L (ref 45–117)
ALT SERPL-CCNC: 76 U/L (ref 12–78)
ANION GAP BLD CALC-SCNC: 8 (ref 10–20)
ANION GAP SERPL CALC-SCNC: 7 MMOL/L (ref 2–12)
AST SERPL-CCNC: 56 U/L (ref 15–37)
BASE EXCESS BLD CALC-SCNC: 3.7 MMOL/L
BASOPHILS # BLD: 0.05 K/UL (ref 0–0.1)
BASOPHILS NFR BLD: 0.3 % (ref 0–1)
BILIRUB SERPL-MCNC: 0.6 MG/DL (ref 0.2–1)
BUN SERPL-MCNC: 12 MG/DL (ref 6–20)
BUN/CREAT SERPL: 16 (ref 12–20)
CA-I BLD-MCNC: 1.23 MMOL/L (ref 1.15–1.33)
CALCIUM SERPL-MCNC: 8.9 MG/DL (ref 8.5–10.1)
CHLORIDE BLD-SCNC: 102 MMOL/L (ref 100–111)
CHLORIDE SERPL-SCNC: 94 MMOL/L (ref 97–108)
CO2 BLD-SCNC: 26 MMOL/L (ref 22–29)
CO2 SERPL-SCNC: 26 MMOL/L (ref 21–32)
CREAT SERPL-MCNC: 0.77 MG/DL (ref 0.55–1.02)
CREAT UR-MCNC: 0.8 MG/DL (ref 0.6–1.3)
DIFFERENTIAL METHOD BLD: ABNORMAL
EKG ATRIAL RATE: 93 BPM
EKG DIAGNOSIS: NORMAL
EKG P AXIS: 29 DEGREES
EKG P-R INTERVAL: 144 MS
EKG Q-T INTERVAL: 382 MS
EKG QRS DURATION: 156 MS
EKG QTC CALCULATION (BAZETT): 474 MS
EKG R AXIS: 108 DEGREES
EKG T AXIS: -59 DEGREES
EKG VENTRICULAR RATE: 93 BPM
EOSINOPHIL # BLD: 0.03 K/UL (ref 0–0.4)
EOSINOPHIL NFR BLD: 0.2 % (ref 0–7)
ERYTHROCYTE [DISTWIDTH] IN BLOOD BY AUTOMATED COUNT: 13.2 % (ref 11.5–14.5)
FLUAV RNA SPEC QL NAA+PROBE: NOT DETECTED
FLUBV RNA SPEC QL NAA+PROBE: NOT DETECTED
GLOBULIN SER CALC-MCNC: 3.5 G/DL (ref 2–4)
GLUCOSE BLD STRIP.AUTO-MCNC: 138 MG/DL (ref 74–99)
GLUCOSE SERPL-MCNC: 135 MG/DL (ref 65–100)
HCO3 BLDA-SCNC: 28 MMOL/L
HCT VFR BLD AUTO: 32.7 % (ref 35–47)
HGB BLD-MCNC: 10.7 G/DL (ref 11.5–16)
IMM GRANULOCYTES # BLD AUTO: 0.12 K/UL (ref 0–0.04)
IMM GRANULOCYTES NFR BLD AUTO: 0.8 % (ref 0–0.5)
LACTATE BLD-SCNC: 0.86 MMOL/L (ref 0.4–2)
LYMPHOCYTES # BLD: 0.78 K/UL (ref 0.8–3.5)
LYMPHOCYTES NFR BLD: 5.2 % (ref 12–49)
MAGNESIUM SERPL-MCNC: 1.7 MG/DL (ref 1.6–2.4)
MCH RBC QN AUTO: 29 PG (ref 26–34)
MCHC RBC AUTO-ENTMCNC: 32.7 G/DL (ref 30–36.5)
MCV RBC AUTO: 88.6 FL (ref 80–99)
MONOCYTES # BLD: 1.19 K/UL (ref 0–1)
MONOCYTES NFR BLD: 7.9 % (ref 5–13)
NEUTS SEG # BLD: 12.83 K/UL (ref 1.8–8)
NEUTS SEG NFR BLD: 85.6 % (ref 32–75)
NRBC # BLD: 0 K/UL (ref 0–0.01)
NRBC BLD-RTO: 0 PER 100 WBC
PCO2 BLDV: 39.3 MMHG (ref 41–51)
PH BLDV: 7.46 (ref 7.32–7.42)
PLATELET # BLD AUTO: 187 K/UL (ref 150–400)
PMV BLD AUTO: 10.1 FL (ref 8.9–12.9)
PO2 BLDV: 46 MMHG (ref 25–40)
POTASSIUM BLD-SCNC: 2.5 MMOL/L (ref 3.5–5.5)
POTASSIUM SERPL-SCNC: 2.3 MMOL/L (ref 3.5–5.1)
PROCALCITONIN SERPL-MCNC: 0.28 NG/ML
PROT SERPL-MCNC: 6.6 G/DL (ref 6.4–8.2)
RBC # BLD AUTO: 3.69 M/UL (ref 3.8–5.2)
RBC MORPH BLD: ABNORMAL
SAO2 % BLD: 84 % (ref 94–98)
SARS-COV-2 RNA RESP QL NAA+PROBE: NOT DETECTED
SERVICE CMNT-IMP: ABNORMAL
SODIUM BLD-SCNC: 136 MMOL/L (ref 136–145)
SODIUM SERPL-SCNC: 127 MMOL/L (ref 136–145)
SOURCE: NORMAL
SPECIMEN SITE: ABNORMAL
TROPONIN I SERPL HS-MCNC: 546 NG/L (ref 0–51)
TROPONIN I SERPL HS-MCNC: 639 NG/L (ref 0–51)
WBC # BLD AUTO: 15 K/UL (ref 3.6–11)

## 2025-06-22 PROCEDURE — 96374 THER/PROPH/DIAG INJ IV PUSH: CPT

## 2025-06-22 PROCEDURE — 93005 ELECTROCARDIOGRAM TRACING: CPT | Performed by: EMERGENCY MEDICINE

## 2025-06-22 PROCEDURE — 84132 ASSAY OF SERUM POTASSIUM: CPT

## 2025-06-22 PROCEDURE — 2580000003 HC RX 258: Performed by: EMERGENCY MEDICINE

## 2025-06-22 PROCEDURE — 84295 ASSAY OF SERUM SODIUM: CPT

## 2025-06-22 PROCEDURE — 6360000002 HC RX W HCPCS: Performed by: STUDENT IN AN ORGANIZED HEALTH CARE EDUCATION/TRAINING PROGRAM

## 2025-06-22 PROCEDURE — 6360000002 HC RX W HCPCS: Performed by: EMERGENCY MEDICINE

## 2025-06-22 PROCEDURE — 6370000000 HC RX 637 (ALT 250 FOR IP): Performed by: EMERGENCY MEDICINE

## 2025-06-22 PROCEDURE — 2500000003 HC RX 250 WO HCPCS: Performed by: EMERGENCY MEDICINE

## 2025-06-22 PROCEDURE — 84145 PROCALCITONIN (PCT): CPT

## 2025-06-22 PROCEDURE — 6370000000 HC RX 637 (ALT 250 FOR IP): Performed by: STUDENT IN AN ORGANIZED HEALTH CARE EDUCATION/TRAINING PROGRAM

## 2025-06-22 PROCEDURE — 71045 X-RAY EXAM CHEST 1 VIEW: CPT

## 2025-06-22 PROCEDURE — 71275 CT ANGIOGRAPHY CHEST: CPT

## 2025-06-22 PROCEDURE — 2500000003 HC RX 250 WO HCPCS: Performed by: STUDENT IN AN ORGANIZED HEALTH CARE EDUCATION/TRAINING PROGRAM

## 2025-06-22 PROCEDURE — 36415 COLL VENOUS BLD VENIPUNCTURE: CPT

## 2025-06-22 PROCEDURE — 6360000004 HC RX CONTRAST MEDICATION: Performed by: STUDENT IN AN ORGANIZED HEALTH CARE EDUCATION/TRAINING PROGRAM

## 2025-06-22 PROCEDURE — 83735 ASSAY OF MAGNESIUM: CPT

## 2025-06-22 PROCEDURE — 87636 SARSCOV2 & INF A&B AMP PRB: CPT

## 2025-06-22 PROCEDURE — 84484 ASSAY OF TROPONIN QUANT: CPT

## 2025-06-22 PROCEDURE — 83036 HEMOGLOBIN GLYCOSYLATED A1C: CPT

## 2025-06-22 PROCEDURE — 99291 CRITICAL CARE FIRST HOUR: CPT

## 2025-06-22 PROCEDURE — 1100000003 HC PRIVATE W/ TELEMETRY

## 2025-06-22 PROCEDURE — 82947 ASSAY GLUCOSE BLOOD QUANT: CPT

## 2025-06-22 PROCEDURE — 82330 ASSAY OF CALCIUM: CPT

## 2025-06-22 PROCEDURE — 80061 LIPID PANEL: CPT

## 2025-06-22 PROCEDURE — 85025 COMPLETE CBC W/AUTO DIFF WBC: CPT

## 2025-06-22 PROCEDURE — 96375 TX/PRO/DX INJ NEW DRUG ADDON: CPT

## 2025-06-22 PROCEDURE — 86738 MYCOPLASMA ANTIBODY: CPT

## 2025-06-22 PROCEDURE — 82803 BLOOD GASES ANY COMBINATION: CPT

## 2025-06-22 PROCEDURE — 87040 BLOOD CULTURE FOR BACTERIA: CPT

## 2025-06-22 PROCEDURE — 80053 COMPREHEN METABOLIC PANEL: CPT

## 2025-06-22 RX ORDER — GABAPENTIN 300 MG/1
900 CAPSULE ORAL EVERY MORNING
Status: DISCONTINUED | OUTPATIENT
Start: 2025-06-23 | End: 2025-06-28 | Stop reason: HOSPADM

## 2025-06-22 RX ORDER — GUAIFENESIN 600 MG/1
600 TABLET, EXTENDED RELEASE ORAL 2 TIMES DAILY
Status: DISCONTINUED | OUTPATIENT
Start: 2025-06-22 | End: 2025-06-28 | Stop reason: HOSPADM

## 2025-06-22 RX ORDER — PANTOPRAZOLE SODIUM 40 MG/1
40 TABLET, DELAYED RELEASE ORAL
Status: DISCONTINUED | OUTPATIENT
Start: 2025-06-23 | End: 2025-06-28 | Stop reason: HOSPADM

## 2025-06-22 RX ORDER — GABAPENTIN 600 MG/1
800 TABLET ORAL 2 TIMES DAILY
Status: DISCONTINUED | OUTPATIENT
Start: 2025-06-22 | End: 2025-06-22 | Stop reason: SDUPTHER

## 2025-06-22 RX ORDER — 0.9 % SODIUM CHLORIDE 0.9 %
1000 INTRAVENOUS SOLUTION INTRAVENOUS ONCE
Status: COMPLETED | OUTPATIENT
Start: 2025-06-22 | End: 2025-06-22

## 2025-06-22 RX ORDER — ONDANSETRON 4 MG/1
4 TABLET, ORALLY DISINTEGRATING ORAL EVERY 8 HOURS PRN
Status: DISCONTINUED | OUTPATIENT
Start: 2025-06-22 | End: 2025-06-28 | Stop reason: HOSPADM

## 2025-06-22 RX ORDER — MAGNESIUM SULFATE IN WATER 40 MG/ML
2000 INJECTION, SOLUTION INTRAVENOUS ONCE
Status: COMPLETED | OUTPATIENT
Start: 2025-06-22 | End: 2025-06-23

## 2025-06-22 RX ORDER — POTASSIUM CHLORIDE 1500 MG/1
40 TABLET, EXTENDED RELEASE ORAL ONCE
Status: COMPLETED | OUTPATIENT
Start: 2025-06-22 | End: 2025-06-22

## 2025-06-22 RX ORDER — MAGNESIUM SULFATE IN WATER 40 MG/ML
2000 INJECTION, SOLUTION INTRAVENOUS PRN
Status: DISCONTINUED | OUTPATIENT
Start: 2025-06-22 | End: 2025-06-28 | Stop reason: HOSPADM

## 2025-06-22 RX ORDER — POTASSIUM CHLORIDE 7.45 MG/ML
10 INJECTION INTRAVENOUS
Status: DISPENSED | OUTPATIENT
Start: 2025-06-22 | End: 2025-06-22

## 2025-06-22 RX ORDER — ENOXAPARIN SODIUM 150 MG/ML
1 INJECTION SUBCUTANEOUS ONCE
Status: COMPLETED | OUTPATIENT
Start: 2025-06-22 | End: 2025-06-22

## 2025-06-22 RX ORDER — ASPIRIN 81 MG/1
81 TABLET, CHEWABLE ORAL DAILY
Status: DISCONTINUED | OUTPATIENT
Start: 2025-06-23 | End: 2025-06-28 | Stop reason: HOSPADM

## 2025-06-22 RX ORDER — SODIUM CHLORIDE 9 MG/ML
INJECTION, SOLUTION INTRAVENOUS PRN
Status: DISCONTINUED | OUTPATIENT
Start: 2025-06-22 | End: 2025-06-28 | Stop reason: HOSPADM

## 2025-06-22 RX ORDER — GABAPENTIN 300 MG/1
1200 CAPSULE ORAL NIGHTLY
Status: DISCONTINUED | OUTPATIENT
Start: 2025-06-22 | End: 2025-06-28 | Stop reason: HOSPADM

## 2025-06-22 RX ORDER — CLOPIDOGREL BISULFATE 75 MG/1
75 TABLET ORAL DAILY
Status: DISCONTINUED | OUTPATIENT
Start: 2025-06-23 | End: 2025-06-28 | Stop reason: HOSPADM

## 2025-06-22 RX ORDER — POTASSIUM CHLORIDE 7.45 MG/ML
10 INJECTION INTRAVENOUS PRN
Status: DISCONTINUED | OUTPATIENT
Start: 2025-06-22 | End: 2025-06-28 | Stop reason: HOSPADM

## 2025-06-22 RX ORDER — POLYETHYLENE GLYCOL 3350 17 G/17G
17 POWDER, FOR SOLUTION ORAL DAILY PRN
Status: DISCONTINUED | OUTPATIENT
Start: 2025-06-22 | End: 2025-06-28 | Stop reason: HOSPADM

## 2025-06-22 RX ORDER — ACETAMINOPHEN 650 MG/1
650 SUPPOSITORY RECTAL EVERY 6 HOURS PRN
Status: DISCONTINUED | OUTPATIENT
Start: 2025-06-22 | End: 2025-06-28 | Stop reason: HOSPADM

## 2025-06-22 RX ORDER — AZITHROMYCIN 250 MG/1
250 TABLET, FILM COATED ORAL DAILY
Status: DISCONTINUED | OUTPATIENT
Start: 2025-06-23 | End: 2025-06-25

## 2025-06-22 RX ORDER — ATORVASTATIN CALCIUM 40 MG/1
40 TABLET, FILM COATED ORAL NIGHTLY
Status: DISCONTINUED | OUTPATIENT
Start: 2025-06-23 | End: 2025-06-28 | Stop reason: HOSPADM

## 2025-06-22 RX ORDER — DULOXETIN HYDROCHLORIDE 30 MG/1
60 CAPSULE, DELAYED RELEASE ORAL DAILY
Status: DISCONTINUED | OUTPATIENT
Start: 2025-06-22 | End: 2025-06-28 | Stop reason: HOSPADM

## 2025-06-22 RX ORDER — SODIUM CHLORIDE 0.9 % (FLUSH) 0.9 %
5-40 SYRINGE (ML) INJECTION PRN
Status: DISCONTINUED | OUTPATIENT
Start: 2025-06-22 | End: 2025-06-28 | Stop reason: HOSPADM

## 2025-06-22 RX ORDER — KETOROLAC TROMETHAMINE 30 MG/ML
15 INJECTION, SOLUTION INTRAMUSCULAR; INTRAVENOUS EVERY 6 HOURS PRN
Status: DISPENSED | OUTPATIENT
Start: 2025-06-22 | End: 2025-06-27

## 2025-06-22 RX ORDER — IPRATROPIUM BROMIDE AND ALBUTEROL SULFATE 2.5; .5 MG/3ML; MG/3ML
1 SOLUTION RESPIRATORY (INHALATION) EVERY 4 HOURS PRN
Status: DISCONTINUED | OUTPATIENT
Start: 2025-06-22 | End: 2025-06-26

## 2025-06-22 RX ORDER — POTASSIUM CHLORIDE 1500 MG/1
40 TABLET, EXTENDED RELEASE ORAL PRN
Status: DISCONTINUED | OUTPATIENT
Start: 2025-06-22 | End: 2025-06-28 | Stop reason: HOSPADM

## 2025-06-22 RX ORDER — ASPIRIN 325 MG
325 TABLET ORAL
Status: COMPLETED | OUTPATIENT
Start: 2025-06-22 | End: 2025-06-22

## 2025-06-22 RX ORDER — IOPAMIDOL 755 MG/ML
100 INJECTION, SOLUTION INTRAVASCULAR
Status: COMPLETED | OUTPATIENT
Start: 2025-06-22 | End: 2025-06-22

## 2025-06-22 RX ORDER — ACETAMINOPHEN 325 MG/1
650 TABLET ORAL EVERY 6 HOURS PRN
Status: DISCONTINUED | OUTPATIENT
Start: 2025-06-22 | End: 2025-06-28 | Stop reason: HOSPADM

## 2025-06-22 RX ORDER — KETOROLAC TROMETHAMINE 30 MG/ML
15 INJECTION, SOLUTION INTRAMUSCULAR; INTRAVENOUS ONCE
Status: COMPLETED | OUTPATIENT
Start: 2025-06-22 | End: 2025-06-22

## 2025-06-22 RX ORDER — ACETAMINOPHEN 325 MG/1
650 TABLET ORAL
Status: COMPLETED | OUTPATIENT
Start: 2025-06-22 | End: 2025-06-22

## 2025-06-22 RX ORDER — SODIUM CHLORIDE 0.9 % (FLUSH) 0.9 %
5-40 SYRINGE (ML) INJECTION EVERY 12 HOURS SCHEDULED
Status: DISCONTINUED | OUTPATIENT
Start: 2025-06-22 | End: 2025-06-28 | Stop reason: HOSPADM

## 2025-06-22 RX ORDER — CLONIDINE HYDROCHLORIDE 0.1 MG/1
0.1 TABLET ORAL
Status: DISCONTINUED | OUTPATIENT
Start: 2025-06-23 | End: 2025-06-23

## 2025-06-22 RX ORDER — ONDANSETRON 2 MG/ML
4 INJECTION INTRAMUSCULAR; INTRAVENOUS EVERY 6 HOURS PRN
Status: DISCONTINUED | OUTPATIENT
Start: 2025-06-22 | End: 2025-06-28 | Stop reason: HOSPADM

## 2025-06-22 RX ADMIN — SODIUM CHLORIDE, PRESERVATIVE FREE 10 ML: 5 INJECTION INTRAVENOUS at 23:27

## 2025-06-22 RX ADMIN — MAGNESIUM SULFATE HEPTAHYDRATE 2000 MG: 40 INJECTION, SOLUTION INTRAVENOUS at 23:46

## 2025-06-22 RX ADMIN — WATER 1000 MG: 1 INJECTION INTRAMUSCULAR; INTRAVENOUS; SUBCUTANEOUS at 20:14

## 2025-06-22 RX ADMIN — POTASSIUM CHLORIDE 10 MEQ: 7.46 INJECTION, SOLUTION INTRAVENOUS at 21:58

## 2025-06-22 RX ADMIN — POTASSIUM CHLORIDE 10 MEQ: 7.46 INJECTION, SOLUTION INTRAVENOUS at 20:18

## 2025-06-22 RX ADMIN — IOPAMIDOL 100 ML: 755 INJECTION, SOLUTION INTRAVENOUS at 21:37

## 2025-06-22 RX ADMIN — KETOROLAC TROMETHAMINE 15 MG: 30 INJECTION, SOLUTION INTRAMUSCULAR at 18:42

## 2025-06-22 RX ADMIN — SODIUM CHLORIDE 1000 ML: 0.9 INJECTION, SOLUTION INTRAVENOUS at 18:42

## 2025-06-22 RX ADMIN — ENOXAPARIN SODIUM 105 MG: 150 INJECTION SUBCUTANEOUS at 23:26

## 2025-06-22 RX ADMIN — GUAIFENESIN 600 MG: 600 TABLET, EXTENDED RELEASE ORAL at 23:26

## 2025-06-22 RX ADMIN — POTASSIUM CHLORIDE 40 MEQ: 1500 TABLET, EXTENDED RELEASE ORAL at 20:11

## 2025-06-22 RX ADMIN — GABAPENTIN 1200 MG: 300 CAPSULE ORAL at 23:25

## 2025-06-22 RX ADMIN — DULOXETINE 60 MG: 30 CAPSULE, DELAYED RELEASE ORAL at 23:26

## 2025-06-22 RX ADMIN — ACETAMINOPHEN 650 MG: 325 TABLET ORAL at 18:42

## 2025-06-22 RX ADMIN — AZITHROMYCIN MONOHYDRATE 500 MG: 500 INJECTION, POWDER, LYOPHILIZED, FOR SOLUTION INTRAVENOUS at 20:15

## 2025-06-22 RX ADMIN — ASPIRIN 325 MG: 325 TABLET ORAL at 20:11

## 2025-06-22 RX ADMIN — MELATONIN 3 MG: at 23:26

## 2025-06-22 NOTE — H&P
panel    Fibromyalgia  Continue PTA as needed gabapentin, duloxetine, Zanaflex nightly    MDD, ALICE  Continue PTA duloxetine    GERD  Formulary substitution Protonix      JUAN PABLO  Nocturnal BiPAP    Medical Decision Making:   I personally reviewed labs: Yes, as listed below  I personally reviewed imaging: CXR  Toxic drug monitoring: Therapeutic Lovenox  Discussed case with: ED provider. After discussion I am in agreement that acuity of patient's medical condition necessitates hospital stay.      Code Status: Full  DVT Prophylaxis: Lovenox  GI Prophylaxis: Protonix  Baseline: Independent    Subjective:   CHIEF COMPLAINT: Hypoxia    HISTORY OF PRESENT ILLNESS:     Sheila Prescott is a 64 y.o.  female with PMHx as listed below presenting to the emergency department accompanied by sister with complaints of generalized malaise, arthralgia/myalgia, hoarse voice, frequent cough productive of mucinous sputum, and subjective fevers and chills.  Patient reports symptoms began Friday and have progressively worsened over the course the day now associated with shortness of breath.  She was evaluated by urgent care earlier with negative COVID and flu testing and was prescribed doxycycline (taken 1 dose so far), albuterol inhaler, and Tessalon pearls.  She began checking her oxygen levels at home noted hypoxia to the 80s prompting her to present to the emergency department at her sister's insistence.  She does report sick exposure to her brother-in-law with URI symptoms.  Denies tobacco, alcohol, illicit drugs.  No history of pulmonary disease.    In the ED, patient febrile to 101.2 °F, hemodynamically stable (hypertensive 130s/50s), saturating low 80s on room air improved to low 90s on 4 L supplemental oxygen via nasal cannula.  ECG demonstrates normal sinus rhythm.  CXR demonstrates bilateral lung infiltrates concerning for pneumonia.  COVID and flu negative.  Labs demonstrate: High-sensitivity troponin 546, magnesium 1.7, sodium  detected NOTD      Rapid Influenza A By PCR Not detected NOTD      Rapid Influenza B By PCR Not detected NOTD     POCT Blood Gas & Electrolytes    Collection Time: 06/22/25  6:31 PM   Result Value Ref Range    PH, VENOUS (POC) 7.46 (H) 7.32 - 7.42      PCO2, Miryam, POC 39.3 (L) 41 - 51 MMHG    PO2, VENOUS (POC) 46 (H) 25 - 40 mmHg    HCO3, Arterial 28 mmol/L    Base Excess 3.7 mmol/L    POC O2 SAT 84 (L) 94 - 98 %    POC Sodium 136 136 - 145 MMOL/L    POC Potassium 2.5 (LL) 3.5 - 5.5 MMOL/L    POC Chloride 102 100 - 111 MMOL/L    POC TCO2 26 22 - 29 MMOL/L    Anion Gap, POC 8 (L) 10 - 20      POC Glucose 138 (H) 74 - 99 MG/DL    POC Creatinine 0.8 0.6 - 1.3 MG/DL    eGFR, POC 83 >60 ml/min/1.73m2    POC Ionized Calcium 1.23 1.15 - 1.33 mmol/L    POC Lactic Acid 0.86 0.40 - 2.00 mmol/L    Source VENOUS BLOOD      Critical Value Read Back TIANNA    Procalcitonin    Collection Time: 06/22/25  6:31 PM   Result Value Ref Range    Procalcitonin 0.28 ng/mL   Magnesium    Collection Time: 06/22/25  6:31 PM   Result Value Ref Range    Magnesium 1.7 1.6 - 2.4 mg/dL   EKG 12 Lead    Collection Time: 06/22/25  6:39 PM   Result Value Ref Range    Ventricular Rate 93 BPM    Atrial Rate 93 BPM    P-R Interval 144 ms    QRS Duration 156 ms    Q-T Interval 382 ms    QTc Calculation (Bazett) 474 ms    P Axis 29 degrees    R Axis 108 degrees    T Axis -59 degrees    Diagnosis       Normal sinus rhythm  Nonspecific intraventricular block  Lateral infarct , age undetermined  Abnormal ECG  When compared with ECG of 04-DEC-2024 16:00,  QRS axis shifted right  T wave inversion more evident in Inferior leads  T wave inversion now evident in Anterior leads  T wave inversion more evident in in V5-V6  Interpreted by me       Confirmed by Angel Fontana DO ([id number]) on 6/22/2025 6:46:58 PM           CT Result (most recent):  CT BRAIN PERFUSION 12/04/2024    Narrative  EXAM:  CTA CODE NEURO HEAD AND NECK W CONT, CT BRAIN PERFUSION    INDICATION:    flow-limiting  stenosis.    Others:  No intracranial mass or neck mass/enhancing lesion.      The critical findings were communicated to Dr. Azeem Montanez, via secure  chat/messaging by Dr. Sunny Angeles at time of this report.        Electronically signed by SUNNY ANGELES        Xray Result (most recent):  XR CHEST PORTABLE 06/22/2025    Narrative  EXAM: XR CHEST PORTABLE  ACC#: [id number]    INDICATION: SIRS, []    COMPARISON: Chest radiograph October 30, 2020    FINDINGS: AP portable chest radiograph. Lungs are adequately expanded. Heart  size is borderline enlarged but stable. Bilateral lung perihilar infiltrates are  noted. No definite effusion or pneumothorax is seen.    Impression  Bilateral lung infiltrates concerning for pneumonia.      Electronically signed by Dante Harris        _______________________________________________________________________    TOTAL TIME:  75 Minutes   TOBACCO CESSATION COUNSELING: No    Critical Care Provided: N/A  (Minutes non procedure based)        Signed:Hugo Shaffer DO  [facility] - Internal Medicine Hospitalist/ Nocturnist  6/22/2025 9:00 PM    Please do not hesitate to reach out to myself or assigned provider on-call via [employer] system with questions or concerns.     Procedures: see electronic medical records for all procedures/Xrays and details which were not copied into this note but were reviewed prior to creation of Plan.

## 2025-06-22 NOTE — ED TRIAGE NOTES
Patient arrives via EMS from home c/o productive cough, fever, and SOB x 2 days. Patient reports has long hx of pneumonia. Patient coughing up brown/blood tinged sputum. Was seen at [facility] with negative covid swab

## 2025-06-23 LAB
ANION GAP SERPL CALC-SCNC: 6 MMOL/L (ref 2–12)
APPEARANCE UR: CLEAR
B PERT DNA SPEC QL NAA+PROBE: NOT DETECTED
BACTERIA URNS QL MICRO: NEGATIVE /HPF
BASOPHILS # BLD: 0.04 K/UL (ref 0–0.1)
BASOPHILS NFR BLD: 0.3 % (ref 0–1)
BILIRUB UR QL CFM: NEGATIVE
BORDETELLA PARAPERTUSSIS BY PCR: NOT DETECTED
BUN SERPL-MCNC: 13 MG/DL (ref 6–20)
BUN/CREAT SERPL: 20 (ref 12–20)
C PNEUM DNA SPEC QL NAA+PROBE: NOT DETECTED
CALCIUM SERPL-MCNC: 9.1 MG/DL (ref 8.5–10.1)
CHLORIDE SERPL-SCNC: 106 MMOL/L (ref 97–108)
CHOLEST SERPL-MCNC: 86 MG/DL
CO2 SERPL-SCNC: 24 MMOL/L (ref 21–32)
COLOR UR: ABNORMAL
CREAT SERPL-MCNC: 0.66 MG/DL (ref 0.55–1.02)
DIFFERENTIAL METHOD BLD: ABNORMAL
EOSINOPHIL # BLD: 0.08 K/UL (ref 0–0.4)
EOSINOPHIL NFR BLD: 0.6 % (ref 0–7)
EPITH CASTS URNS QL MICRO: ABNORMAL /LPF
ERYTHROCYTE [DISTWIDTH] IN BLOOD BY AUTOMATED COUNT: 13.4 % (ref 11.5–14.5)
EST. AVERAGE GLUCOSE BLD GHB EST-MCNC: 114 MG/DL
FLUAV SUBTYP SPEC NAA+PROBE: NOT DETECTED
FLUBV RNA SPEC QL NAA+PROBE: NOT DETECTED
GLUCOSE SERPL-MCNC: 118 MG/DL (ref 65–100)
GLUCOSE UR STRIP.AUTO-MCNC: NEGATIVE MG/DL
HADV DNA SPEC QL NAA+PROBE: NOT DETECTED
HBA1C MFR BLD: 5.6 % (ref 4–5.6)
HCOV 229E RNA SPEC QL NAA+PROBE: NOT DETECTED
HCOV HKU1 RNA SPEC QL NAA+PROBE: NOT DETECTED
HCOV NL63 RNA SPEC QL NAA+PROBE: NOT DETECTED
HCOV OC43 RNA SPEC QL NAA+PROBE: NOT DETECTED
HCT VFR BLD AUTO: 32.3 % (ref 35–47)
HDLC SERPL-MCNC: 34 MG/DL
HDLC SERPL: 2.5 (ref 0–5)
HGB BLD-MCNC: 10.6 G/DL (ref 11.5–16)
HGB UR QL STRIP: NEGATIVE
HMPV RNA SPEC QL NAA+PROBE: NOT DETECTED
HPIV1 RNA SPEC QL NAA+PROBE: NOT DETECTED
HPIV2 RNA SPEC QL NAA+PROBE: NOT DETECTED
HPIV3 RNA SPEC QL NAA+PROBE: NOT DETECTED
HPIV4 RNA SPEC QL NAA+PROBE: NOT DETECTED
HYALINE CASTS URNS QL MICRO: ABNORMAL /LPF (ref 0–5)
IMM GRANULOCYTES # BLD AUTO: 0.11 K/UL (ref 0–0.04)
IMM GRANULOCYTES NFR BLD AUTO: 0.8 % (ref 0–0.5)
KETONES UR QL STRIP.AUTO: NEGATIVE MG/DL
LDLC SERPL CALC-MCNC: 40 MG/DL (ref 0–100)
LEUKOCYTE ESTERASE UR QL STRIP.AUTO: NEGATIVE
LYMPHOCYTES # BLD: 1.07 K/UL (ref 0.8–3.5)
LYMPHOCYTES NFR BLD: 7.4 % (ref 12–49)
M PNEUMO DNA SPEC QL NAA+PROBE: NOT DETECTED
MCH RBC QN AUTO: 29.9 PG (ref 26–34)
MCHC RBC AUTO-ENTMCNC: 32.8 G/DL (ref 30–36.5)
MCV RBC AUTO: 91 FL (ref 80–99)
MONOCYTES # BLD: 1.27 K/UL (ref 0–1)
MONOCYTES NFR BLD: 8.8 % (ref 5–13)
NEUTS SEG # BLD: 11.92 K/UL (ref 1.8–8)
NEUTS SEG NFR BLD: 82.1 % (ref 32–75)
NITRITE UR QL STRIP.AUTO: NEGATIVE
NRBC # BLD: 0 K/UL (ref 0–0.01)
NRBC BLD-RTO: 0 PER 100 WBC
PH UR STRIP: 5.5 (ref 5–8)
PLATELET # BLD AUTO: 172 K/UL (ref 150–400)
PMV BLD AUTO: 10.2 FL (ref 8.9–12.9)
POTASSIUM SERPL-SCNC: 3.1 MMOL/L (ref 3.5–5.1)
PROT UR STRIP-MCNC: 30 MG/DL
RBC # BLD AUTO: 3.55 M/UL (ref 3.8–5.2)
RBC #/AREA URNS HPF: ABNORMAL /HPF (ref 0–5)
RSV RNA SPEC QL NAA+PROBE: NOT DETECTED
RV+EV RNA SPEC QL NAA+PROBE: NOT DETECTED
SARS-COV-2 RNA RESP QL NAA+PROBE: NOT DETECTED
SODIUM SERPL-SCNC: 136 MMOL/L (ref 136–145)
SP GR UR REFRACTOMETRY: 1.02 (ref 1–1.03)
TRIGL SERPL-MCNC: 60 MG/DL
TROPONIN I SERPL HS-MCNC: 383 NG/L (ref 0–51)
URINE CULTURE IF INDICATED: ABNORMAL
UROBILINOGEN UR QL STRIP.AUTO: 1 EU/DL (ref 0.2–1)
VLDLC SERPL CALC-MCNC: 12 MG/DL
WBC # BLD AUTO: 14.5 K/UL (ref 3.6–11)
WBC URNS QL MICRO: ABNORMAL /HPF (ref 0–4)
YEAST URNS QL MICRO: PRESENT

## 2025-06-23 PROCEDURE — 6370000000 HC RX 637 (ALT 250 FOR IP): Performed by: INTERNAL MEDICINE

## 2025-06-23 PROCEDURE — 1100000003 HC PRIVATE W/ TELEMETRY

## 2025-06-23 PROCEDURE — 2500000003 HC RX 250 WO HCPCS: Performed by: STUDENT IN AN ORGANIZED HEALTH CARE EDUCATION/TRAINING PROGRAM

## 2025-06-23 PROCEDURE — 80048 BASIC METABOLIC PNL TOTAL CA: CPT

## 2025-06-23 PROCEDURE — 87205 SMEAR GRAM STAIN: CPT

## 2025-06-23 PROCEDURE — 87077 CULTURE AEROBIC IDENTIFY: CPT

## 2025-06-23 PROCEDURE — 2700000000 HC OXYGEN THERAPY PER DAY

## 2025-06-23 PROCEDURE — 0202U NFCT DS 22 TRGT SARS-COV-2: CPT

## 2025-06-23 PROCEDURE — 36415 COLL VENOUS BLD VENIPUNCTURE: CPT

## 2025-06-23 PROCEDURE — 87186 SC STD MICRODIL/AGAR DIL: CPT

## 2025-06-23 PROCEDURE — 87449 NOS EACH ORGANISM AG IA: CPT

## 2025-06-23 PROCEDURE — 6360000002 HC RX W HCPCS: Performed by: STUDENT IN AN ORGANIZED HEALTH CARE EDUCATION/TRAINING PROGRAM

## 2025-06-23 PROCEDURE — 87086 URINE CULTURE/COLONY COUNT: CPT

## 2025-06-23 PROCEDURE — 81001 URINALYSIS AUTO W/SCOPE: CPT

## 2025-06-23 PROCEDURE — 94760 N-INVAS EAR/PLS OXIMETRY 1: CPT

## 2025-06-23 PROCEDURE — 87070 CULTURE OTHR SPECIMN AEROBIC: CPT

## 2025-06-23 PROCEDURE — 6370000000 HC RX 637 (ALT 250 FOR IP): Performed by: STUDENT IN AN ORGANIZED HEALTH CARE EDUCATION/TRAINING PROGRAM

## 2025-06-23 PROCEDURE — 84484 ASSAY OF TROPONIN QUANT: CPT

## 2025-06-23 PROCEDURE — 85025 COMPLETE CBC W/AUTO DIFF WBC: CPT

## 2025-06-23 RX ORDER — POTASSIUM CHLORIDE 1500 MG/1
40 TABLET, EXTENDED RELEASE ORAL ONCE
Status: COMPLETED | OUTPATIENT
Start: 2025-06-23 | End: 2025-06-23

## 2025-06-23 RX ORDER — METOPROLOL SUCCINATE 25 MG/1
25 TABLET, EXTENDED RELEASE ORAL DAILY
Status: DISCONTINUED | OUTPATIENT
Start: 2025-06-23 | End: 2025-06-28 | Stop reason: HOSPADM

## 2025-06-23 RX ADMIN — CLOPIDOGREL BISULFATE 75 MG: 75 TABLET, FILM COATED ORAL at 09:30

## 2025-06-23 RX ADMIN — KETOROLAC TROMETHAMINE 15 MG: 30 INJECTION, SOLUTION INTRAMUSCULAR at 19:52

## 2025-06-23 RX ADMIN — TIZANIDINE 4 MG: 4 TABLET ORAL at 20:01

## 2025-06-23 RX ADMIN — GABAPENTIN 900 MG: 300 CAPSULE ORAL at 09:31

## 2025-06-23 RX ADMIN — SODIUM CHLORIDE, PRESERVATIVE FREE 10 ML: 5 INJECTION INTRAVENOUS at 22:32

## 2025-06-23 RX ADMIN — ATORVASTATIN CALCIUM 40 MG: 40 TABLET, FILM COATED ORAL at 20:01

## 2025-06-23 RX ADMIN — PANTOPRAZOLE SODIUM 40 MG: 40 TABLET, DELAYED RELEASE ORAL at 06:37

## 2025-06-23 RX ADMIN — WATER 1000 MG: 1 INJECTION INTRAMUSCULAR; INTRAVENOUS; SUBCUTANEOUS at 20:01

## 2025-06-23 RX ADMIN — KETOROLAC TROMETHAMINE 15 MG: 30 INJECTION, SOLUTION INTRAMUSCULAR at 13:04

## 2025-06-23 RX ADMIN — POLYETHYLENE GLYCOL 3350 17 G: 17 POWDER, FOR SOLUTION ORAL at 19:58

## 2025-06-23 RX ADMIN — DULOXETINE 60 MG: 30 CAPSULE, DELAYED RELEASE ORAL at 09:30

## 2025-06-23 RX ADMIN — POTASSIUM CHLORIDE 10 MEQ: 7.46 INJECTION, SOLUTION INTRAVENOUS at 02:36

## 2025-06-23 RX ADMIN — POTASSIUM CHLORIDE 40 MEQ: 1500 TABLET, EXTENDED RELEASE ORAL at 09:30

## 2025-06-23 RX ADMIN — AZITHROMYCIN 250 MG: 250 TABLET, FILM COATED ORAL at 09:30

## 2025-06-23 RX ADMIN — GUAIFENESIN 600 MG: 600 TABLET, EXTENDED RELEASE ORAL at 09:31

## 2025-06-23 RX ADMIN — METOPROLOL SUCCINATE 25 MG: 25 TABLET, EXTENDED RELEASE ORAL at 20:08

## 2025-06-23 RX ADMIN — GABAPENTIN 1200 MG: 300 CAPSULE ORAL at 20:01

## 2025-06-23 RX ADMIN — SODIUM CHLORIDE, PRESERVATIVE FREE 10 ML: 5 INJECTION INTRAVENOUS at 09:35

## 2025-06-23 RX ADMIN — GUAIFENESIN 600 MG: 600 TABLET, EXTENDED RELEASE ORAL at 20:01

## 2025-06-23 RX ADMIN — POTASSIUM CHLORIDE 10 MEQ: 7.46 INJECTION, SOLUTION INTRAVENOUS at 00:07

## 2025-06-23 RX ADMIN — ASPIRIN 81 MG: 81 TABLET, CHEWABLE ORAL at 09:30

## 2025-06-23 ASSESSMENT — PAIN SCALES - GENERAL
PAINLEVEL_OUTOF10: 5
PAINLEVEL_OUTOF10: 5
PAINLEVEL_OUTOF10: 0

## 2025-06-23 ASSESSMENT — PAIN DESCRIPTION - LOCATION
LOCATION: HEAD
LOCATION: ABDOMEN;HEAD

## 2025-06-23 ASSESSMENT — PAIN DESCRIPTION - DESCRIPTORS
DESCRIPTORS: ACHING
DESCRIPTORS: ACHING

## 2025-06-23 ASSESSMENT — PAIN DESCRIPTION - ORIENTATION: ORIENTATION: ANTERIOR

## 2025-06-23 NOTE — PROGRESS NOTES
End of Shift Note    Bedside shift change report given to Melanie ENCARNACION (oncoming nurse) by Bob Harvey RN (offgoing nurse).  Report included the following information SBAR, ED Summary, Intake/Output, MAR, Recent Results, Med Rec Status, Cardiac Rhythm NSR, and Alarm Parameters     Shift worked:  Night     Shift summary and any significant changes:    Received patient from ER with 5L O2 via NC, dyspnea at rest but feels much better according to patient.   accompanied by sister at bedside.  AO x4 ambulatory, due meds given per MAR. IV potassium unable to tolerate on admin rate, run at 50 ml\hr given two more cycles. Completed 4 cycles of IV Potassium.  Troponin uptrending, Dr. Shaffer noted. Ordered to repeat in AM.  Caring rounds done, Plan of care ongoing.          Concerns for physician to address:       Zone phone for oncoming shift:          Activity:  Level of Assistance: Independent  Number times ambulated in hallways past shift: 0  Number of times OOB to chair past shift: 1    Cardiac:   Cardiac Monitoring: Yes      Cardiac Rhythm: Sinus rhythm    Access:  Current line(s): PIV     Genitourinary:        Respiratory:   O2 Device: Nasal cannula  Chronic home O2 use?: NO  Incentive spirometer at bedside: NO    GI:  Last BM (including prior to admit): 06/20/25  Current diet:  ADULT DIET; Regular; 4 carb choices (60 gm/meal); Low Fat/Low Chol/High Fiber/ARMIDA  Passing flatus: YES    Pain Management:   Patient states pain is manageable on current regimen: N/A    Skin:  James Scale Score: 22  Interventions: Wound Offloading (Prevention Methods): Pillows, Repositioning    Patient Safety:  Fall Risk: Nursing Judgement-Fall Risk High(Add Comments): No  Fall Risk Interventions  Nursing Judgement-Fall Risk High(Add Comments): No  Toilet Every 2 Hours-In Advance of Need: Yes  Hourly Visual Checks: Awake, In bed  Fall Visual Posted: Armband, Socks  Room Door Open: Deferred for droplet isolation  Alarm On:

## 2025-06-23 NOTE — ED PROVIDER NOTES
[facility] 3 MED TELE  EMERGENCY DEPARTMENT ENCOUNTER       Pt Name: Sheila Prescott  MRN: [id number]  Birthdate 1960  Date of evaluation: 6/22/2025  Provider: Angel Fontana DO   PCP: Hue Montanez MD  Note Started: 11:22 PM EDT 6/22/25     CHIEF COMPLAINT       Chief Complaint   Patient presents with    Cough    Shortness of Breath        HISTORY OF PRESENT ILLNESS: 1 or more elements      History From: patient, History limited by: none     Sheila Prescott is a 64 y.o. female presents to the emergency department by private vehicle for evaluation of cough and shortness of breath.       Please See MDM for Additional Details of the HPI/PMH  Nursing Notes were all reviewed and agreed with or any disagreements were addressed in the HPI.     REVIEW OF SYSTEMS        Positives and Pertinent negatives as per HPI.    PAST HISTORY     Past Medical History:  Past Medical History:   Diagnosis Date    Fibromyalgia     Hypercholesterolemia     Hypertension     Kidney stones     passed 72 stones    Other ill-defined conditions(799.89)     kidney stones       Past Surgical History:  Past Surgical History:   Procedure Laterality Date    OTHER SURGICAL HISTORY      lung biopsy       Family History:  No family history on file.    Social History:  Social History     Tobacco Use    Smoking status: Never    Smokeless tobacco: Never   Substance Use Topics    Alcohol use: No    Drug use: No       Allergies:  Allergies   Allergen Reactions    Sulfa Antibiotics Other (See Comments)     dizziness       CURRENT MEDICATIONS      Current Discharge Medication List        CONTINUE these medications which have NOT CHANGED    Details   atorvastatin (LIPITOR) 40 MG tablet Take 1 tablet by mouth nightly  Qty: 30 tablet, Refills: 1      clopidogrel (PLAVIX) 75 MG tablet Take 1 tablet by mouth daily for 21 days  Qty: 21 tablet, Refills: 0      gabapentin (NEURONTIN) 600 MG tablet Take 1.5 tab in morning , 2 tabs in evening as before per your  (NEURONTIN) capsule 900 mg (has no administration in time range)   gabapentin (NEURONTIN) capsule 1,200 mg (has no administration in time range)   acetaminophen (TYLENOL) tablet 650 mg (650 mg Oral Given 6/22/25 1842)   ketorolac (TORADOL) injection 15 mg (15 mg IntraVENous Given 6/22/25 1842)   sodium chloride 0.9 % bolus 1,000 mL (0 mLs IntraVENous Stopped 6/22/25 2318)   aspirin tablet 325 mg (325 mg Oral Given 6/22/25 2011)   cefTRIAXone (ROCEPHIN) 1,000 mg in sterile water 10 mL IV syringe (1,000 mg IntraVENous Given 6/22/25 2014)   azithromycin (ZITHROMAX) 500 mg in sodium chloride 0.9 % 250 mL IVPB (Yacz8Ppt) (0 mg IntraVENous Stopped 6/22/25 2318)   potassium chloride (KLOR-CON M) extended release tablet 40 mEq (40 mEq Oral Given 6/22/25 2011)   iopamidol (ISOVUE-370) 76 % injection 100 mL (100 mLs IntraVENous Given 6/22/25 2137)       Medical Decision Making  Amount and/or Complexity of Data Reviewed  Labs: ordered.  Radiology: ordered.  ECG/medicine tests: ordered.    Risk  OTC drugs.  Prescription drug management.  Decision regarding hospitalization.        Patient presents to the emergency department for evaluation of cough and shortness of breath.  Patient states over the past 2 days she had developed symptoms of a productive cough in addition to shortness of breath worse with exertion.  She also notes fever and chills.  She states that she has been coughing up brown blood-tinged sputum.  She states that she had been seen at an urgent care prior to coming to the hospital and was noted to be hypoxic.  She states she was referred to the emergency department for this reason.  She denies any current chest pain.  She denies any abdominal pain, nausea vomiting diarrhea.    On exam patient noted to be hypoxic at 81% on room air.  Patient was placed on oxygen nasal cannula and titrated up to 6 L to maintain oxygen saturations greater than 90.  Will obtain blood work to include CBC CMP troponin blood cultures x

## 2025-06-23 NOTE — PROGRESS NOTES
End of Shift Note    Bedside shift change report given to FABIENNE Rojas (oncoming nurse) by Melanie Carolina LPN (offgoing nurse).  Report included the following information SBAR    Shift worked:  0700 - 1930     Shift summary and any significant changes:     0758 -- Call received from Lluvia in the lab for critical result - Troponin is 383 which is down from previous of 639 drawn last night. Per MD - ensure cardiac consult is called in. Cardiac consult is in and being processed by unit secretary. There is also an order for ECHO.     No acute events throughout shift. Cards consulted and in to see pt today. Toradol given prn for abd pain due to coughing - see MAR. Pt resting comfortably upon shift completion, all concerns addressed.      Concerns for physician to address:  none     Zone phone for oncoming shift:          Activity:  Level of Assistance: Independent  Number times ambulated in hallways past shift: 0  Number of times OOB to chair past shift: 1    Cardiac:   Cardiac Monitoring: Yes      Cardiac Rhythm: Sinus rhythm    Access:  Current line(s): PIV    Genitourinary:        Respiratory:   O2 Device: Nasal cannula  Chronic home O2 use?: NO  Incentive spirometer at bedside: YES    GI:  Last BM (including prior to admit): 06/20/25  Current diet:  ADULT DIET; Regular; 4 carb choices (60 gm/meal); Low Fat/Low Chol/High Fiber/ARMIDA  Passing flatus: YES    Pain Management:   Patient states pain is manageable on current regimen: YES    Skin:  James Scale Score: 22  Interventions: Wound Offloading (Prevention Methods): Pillows, Repositioning    Patient Safety:  Fall Risk: Nursing Judgement-Fall Risk High(Add Comments): No  Fall Risk Interventions  Nursing Judgement-Fall Risk High(Add Comments): No  Toilet Every 2 Hours-In Advance of Need: Yes  Hourly Visual Checks: Awake, In bed  Fall Visual Posted: Armband, Socks  Room Door Open: Deferred for droplet isolation  Alarm On: Bed  Patient Moved Closer to Nursing Station:

## 2025-06-23 NOTE — PLAN OF CARE
Problem: Discharge Planning  Goal: Discharge to home or other facility with appropriate resources  Outcome: Progressing     Problem: Safety - Adult  Goal: Free from fall injury  Outcome: Progressing     Problem: Respiratory - Adult  Goal: Achieves optimal ventilation and oxygenation  Outcome: Progressing     Problem: Gastrointestinal - Adult  Goal: Minimal or absence of nausea and vomiting  Outcome: Progressing  Goal: Maintains or returns to baseline bowel function  Outcome: Progressing     Problem: Infection - Adult  Goal: Absence of infection at discharge  Outcome: Progressing  Goal: Absence of infection during hospitalization  Outcome: Progressing

## 2025-06-23 NOTE — PROGRESS NOTES
Called to see patient for elevated troponin. However, patient of Dr. Marco Velazco. Please call [facility] ([facility] I updated the consult order.     Thank you for allowing us to participate in the care of this patient.  Feel free to reach back out if we could be of any further assistance.

## 2025-06-23 NOTE — CONSULTS
Cardiology Consult Note    CC: Fever; hypoxia    PCP:Hue Montanez MD  Reason for consult:  Elevated troponin  Requesting MD:  Dr. Coreas  Admit Date: 6/22/2025   Today's Date: 6/23/2025   Cardiologist:  Dr Velazco.   Cardiac Assessment/Plan:   HTN  Arthritis - RA  Fibromyalgia   Anxiety   GERD  Pedal edema, minimal   TIA: Chronic DAPT    Admitted w/ fever; hypoxia, pulm infiltrates/productive cough & hemoptysis for last 2 days.  Na 127; K 2.3; Trop upto 600s; LBBB noted    Current cardiac meds 6/23: asa/plavix; lipitor 40; clonidine 0.1 qhs;     IMP: type II NQWMI; cont supportive care    Rec 6/23: F/U echo; future cath vs MPI dep on clinical course.  Change clonidine to toprol XL  With hemoptysis, no role for invasive cardiac Rx currently;   Hold plavix for now. (?pulm hemorrhage: eval per primary team).     Hospital Problem List:  Principal Problem:    Multifocal pneumonia  Resolved Problems:    * No resolved hospital problems. *     ____________________________________________________________________  Sheila Prescott is a 64 y.o. female presents with Acute coronary syndrome (HCC) [I24.9]  Multifocal pneumonia [J18.9].    As noted in H&P: \"64 y.o.  female with PMHx as listed below presenting to the emergency department accompanied by sister with complaints of generalized malaise, arthralgia/myalgia, hoarse voice, frequent cough productive of mucinous sputum, and subjective fevers and chills.  Patient reports symptoms began Friday and have progressively worsened over the course the day now associated with shortness of breath.  She was evaluated by urgent care earlier with negative COVID and flu testing and was prescribed doxycycline (taken 1 dose so far), albuterol inhaler, and Tessalon pearls.  She began checking her oxygen levels at home noted hypoxia to the 80s prompting her to present to the emergency department at her sister's insistence.  She does report sick exposure to her brother-in-law with        23 MEDICATION LIST  Medication Sig Desc Non-[facility]   buspirone 30 mg tablet take 1 tablet by oral route 2 times every day as needed Y   clonidine HCl 0.1 mg tablet TAKE 1 MG BY MOUTH AT BEDTIME AS DIRECTED Y   duloxetine 60 mg capsule,delayed release TAKE 1 CAPSULE BY MOUTH EVERY DAY Y   furosemide 20 mg tablet take 1 tablet by oral route  every day Y   gabapentin 600 mg tablet TAKE 1 AND 1/2 TABLET BY MOUTH IN THE MORNING AND 2 TABLETS AT BEDTIME AS DIRECTED Y   meloxicam 15 mg tablet TAKE 1/2 TO 1 TABLET BY MOUTH ONCE DAILY Y   omeprazole 10 mg capsule,delayed release take 1 tablet by oral route once daily Y   potassium chloride ER 10 mEq capsule,extended release take 2 tablets by oral route once twice daily Y   Premarin 0.625 mg tablet take 1 tablet by oral route once daily Y   progesterone micronized 100 mg capsule take 1 tablet by oral route once daily Y   topiramate 50 mg tablet TAKE 1 TABLET BY MOUTH TWICE DAILY. MUST FOLLOW UP BEFORE ADDITIONAL REFILLS GIVEN Y   triamterene 37.5 mg-hydrochlorothiazide 25 mg capsule take 1 tablet by oral route once daily Y     ___________________________________________________________  1.  Hypertension   CARDIOVASCULAR PROCEDURES  Procedure Date Results   EKG 2023 Sinus Rhythm       ACTIVE ALLERGIES:  Ingredient Reaction (Severity) Medication Name Comment   SULFA (SULFONAMIDE ANTIBIOTICS)          PROBLEM LIST:  Problem List reviewed.   Problem Description Onset Date Chronic Clinical Status Notes   Hypertension  Y     Past Medical/Surgical History   (Detailed)  Disease/disorder Onset Date Management Date Comments   Arthritis       Arthritis, Rheumatoid           Family History   (Detailed)    Relationship Family Member Name  Age at Death Condition Onset Age Cause of Death   Brother    Stroke     Brother    Diabetes     Father    CHF       Social History  (Detailed)  Tobacco use reviewed.    Preferred language is English.   Alert and oriented x 3;  Grossly non-focal. Normal mood and affect.     Labs:   Recent Results (from the past 24 hours)   Blood Culture 1    Collection Time: 06/22/25  6:31 PM    Specimen: Blood   Result Value Ref Range    Special Requests NO SPECIAL REQUESTS  LEFT  FOREARM        Culture NO GROWTH AFTER 11 HOURS     Blood Culture 2    Collection Time: 06/22/25  6:31 PM    Specimen: Blood   Result Value Ref Range    Special Requests NO SPECIAL REQUESTS  RIGHT  Antecubital        Culture NO GROWTH AFTER 11 HOURS     CBC with Auto Differential    Collection Time: 06/22/25  6:31 PM   Result Value Ref Range    WBC 15.0 (H) 3.6 - 11.0 K/uL    RBC 3.69 (L) 3.80 - 5.20 M/uL    Hemoglobin 10.7 (L) 11.5 - 16.0 g/dL    Hematocrit 32.7 (L) 35.0 - 47.0 %    MCV 88.6 80.0 - 99.0 FL    MCH 29.0 26.0 - 34.0 PG    MCHC 32.7 30.0 - 36.5 g/dL    RDW 13.2 11.5 - 14.5 %    Platelets 187 150 - 400 K/uL    MPV 10.1 8.9 - 12.9 FL    Nucleated RBCs 0.0 0  WBC    nRBC 0.00 0.00 - 0.01 K/uL    Neutrophils % 85.6 (H) 32.0 - 75.0 %    Lymphocytes % 5.2 (L) 12.0 - 49.0 %    Monocytes % 7.9 5.0 - 13.0 %    Eosinophils % 0.2 0.0 - 7.0 %    Basophils % 0.3 0.0 - 1.0 %    Immature Granulocytes % 0.8 (H) 0.0 - 0.5 %    Neutrophils Absolute 12.83 (H) 1.80 - 8.00 K/UL    Lymphocytes Absolute 0.78 (L) 0.80 - 3.50 K/UL    Monocytes Absolute 1.19 (H) 0.00 - 1.00 K/UL    Eosinophils Absolute 0.03 0.00 - 0.40 K/UL    Basophils Absolute 0.05 0.00 - 0.10 K/UL    Immature Granulocytes Absolute 0.12 (H) 0.00 - 0.04 K/UL    Differential Type SMEAR SCANNED      RBC Comment NORMOCYTIC, NORMOCHROMIC     Comprehensive Metabolic Panel    Collection Time: 06/22/25  6:31 PM   Result Value Ref Range    Sodium 127 (L) 136 - 145 mmol/L    Potassium 2.3 (LL) 3.5 - 5.1 mmol/L    Chloride 94 (L) 97 - 108 mmol/L    CO2 26 21 - 32 mmol/L    Anion Gap 7 2 - 12 mmol/L    Glucose 135 (H) 65 - 100 mg/dL    BUN 12 6 - 20 MG/DL    Creatinine 0.77 0.55 - 1.02 MG/DL

## 2025-06-23 NOTE — PROGRESS NOTES
Hospitalist Progress Note    NAME:   Sheila Prescott   : 1960   MRN: [id number]     Date/Time: 2025 4:13 PM  Patient PCP: Hue Montanez MD    Estimated discharge date:   Barriers: Respiratory status improvement, O2 weaned down, cardiac      Assessment / Plan:  Acute hypoxemic respiratory failure  Multifocal pneumonia  Sepsis secondary to above  Admit to telemetry monitoring  Continuous pulse oximetry and supplemental oxygen as needed maintain saturations greater than 90%  Respiratory viral panel  Culture sputum  Strep and Legionella urinary antigens  Mycoplasma serologies  Continue empiric ceftriaxone azithromycin for 5-day course  Start guaifenesin  As needed Toradol for refractory fevers/pain  CT chest showed:Bilateral airspace infiltrates compatible with pneumonia with no  pulmonary embolus and probable reactive bilateral hilar and mediastinal  adenopathy for which follow-up is recommended.       Severe hypokalemia  Mild hypomagnesemia  Moderate hyponatremia  Repletion ordered-trend and replace further as indicated  Trend BMP     Elevated troponin-suspect demand ischemia  Essential hypertension  Hyperlipidemia  Trend troponin  Therapeutic Lovenox now out of abundance of caution  Full-strength aspirin given in ED  Cardiology consulted, greatly appreciate their expertise  Obtain TTE  Continue PTA aspirin, atorvastatin, Plavix  Continue PTA clonidine nightly  Labs: Hemoglobin A1c, lipid panel  Next dose for full AC at 8 PM, will defer to cardiology if we continue full anticoagulation     Fibromyalgia  Continue PTA as needed gabapentin, duloxetine, Zanaflex nightly     MDD, ALICE  Continue PTA duloxetine     GERD  Formulary substitution Protonix        JUAN PABLO  Nocturnal BiPAP      Incidental findings left nephrolithiasis  CT scan showed nonobstructing left nephrolithiasis 5mm      Medical Decision Making:   I personally reviewed labs: Yes CBC, BMP  I personally reviewed imaging: Yes  I

## 2025-06-24 LAB
ANION GAP SERPL CALC-SCNC: 6 MMOL/L (ref 2–12)
BACTERIA SPEC CULT: NORMAL
BUN SERPL-MCNC: 16 MG/DL (ref 6–20)
BUN/CREAT SERPL: 25 (ref 12–20)
CALCIUM SERPL-MCNC: 9.1 MG/DL (ref 8.5–10.1)
CHLORIDE SERPL-SCNC: 106 MMOL/L (ref 97–108)
CO2 SERPL-SCNC: 24 MMOL/L (ref 21–32)
CREAT SERPL-MCNC: 0.64 MG/DL (ref 0.55–1.02)
ERYTHROCYTE [DISTWIDTH] IN BLOOD BY AUTOMATED COUNT: 13.3 % (ref 11.5–14.5)
GLUCOSE SERPL-MCNC: 131 MG/DL (ref 65–100)
HCT VFR BLD AUTO: 31.4 % (ref 35–47)
HGB BLD-MCNC: 9.8 G/DL (ref 11.5–16)
M PNEUMO IGG SER IA-ACNC: 224 U/ML (ref 0–99)
M PNEUMO IGM SER IA-ACNC: <770 U/ML (ref 0–769)
MCH RBC QN AUTO: 29.2 PG (ref 26–34)
MCHC RBC AUTO-ENTMCNC: 31.2 G/DL (ref 30–36.5)
MCV RBC AUTO: 93.5 FL (ref 80–99)
NRBC # BLD: 0 K/UL (ref 0–0.01)
NRBC BLD-RTO: 0 PER 100 WBC
PLATELET # BLD AUTO: 187 K/UL (ref 150–400)
PMV BLD AUTO: 10.2 FL (ref 8.9–12.9)
POTASSIUM SERPL-SCNC: 3.3 MMOL/L (ref 3.5–5.1)
RBC # BLD AUTO: 3.36 M/UL (ref 3.8–5.2)
SERVICE CMNT-IMP: NORMAL
SODIUM SERPL-SCNC: 136 MMOL/L (ref 136–145)
WBC # BLD AUTO: 9.9 K/UL (ref 3.6–11)

## 2025-06-24 PROCEDURE — 6370000000 HC RX 637 (ALT 250 FOR IP): Performed by: STUDENT IN AN ORGANIZED HEALTH CARE EDUCATION/TRAINING PROGRAM

## 2025-06-24 PROCEDURE — 2500000003 HC RX 250 WO HCPCS: Performed by: STUDENT IN AN ORGANIZED HEALTH CARE EDUCATION/TRAINING PROGRAM

## 2025-06-24 PROCEDURE — 6360000002 HC RX W HCPCS: Performed by: STUDENT IN AN ORGANIZED HEALTH CARE EDUCATION/TRAINING PROGRAM

## 2025-06-24 PROCEDURE — 6370000000 HC RX 637 (ALT 250 FOR IP): Performed by: INTERNAL MEDICINE

## 2025-06-24 PROCEDURE — 85027 COMPLETE CBC AUTOMATED: CPT

## 2025-06-24 PROCEDURE — 36415 COLL VENOUS BLD VENIPUNCTURE: CPT

## 2025-06-24 PROCEDURE — 94760 N-INVAS EAR/PLS OXIMETRY 1: CPT

## 2025-06-24 PROCEDURE — 1100000003 HC PRIVATE W/ TELEMETRY

## 2025-06-24 PROCEDURE — 80048 BASIC METABOLIC PNL TOTAL CA: CPT

## 2025-06-24 PROCEDURE — 2700000000 HC OXYGEN THERAPY PER DAY

## 2025-06-24 PROCEDURE — 94640 AIRWAY INHALATION TREATMENT: CPT

## 2025-06-24 PROCEDURE — 94761 N-INVAS EAR/PLS OXIMETRY MLT: CPT

## 2025-06-24 RX ORDER — POTASSIUM CHLORIDE 1500 MG/1
40 TABLET, EXTENDED RELEASE ORAL ONCE
Status: COMPLETED | OUTPATIENT
Start: 2025-06-24 | End: 2025-06-24

## 2025-06-24 RX ORDER — IPRATROPIUM BROMIDE AND ALBUTEROL SULFATE 2.5; .5 MG/3ML; MG/3ML
1 SOLUTION RESPIRATORY (INHALATION)
Status: DISCONTINUED | OUTPATIENT
Start: 2025-06-24 | End: 2025-06-26

## 2025-06-24 RX ADMIN — POTASSIUM CHLORIDE 40 MEQ: 1500 TABLET, EXTENDED RELEASE ORAL at 10:12

## 2025-06-24 RX ADMIN — IPRATROPIUM BROMIDE AND ALBUTEROL SULFATE 1 DOSE: .5; 3 SOLUTION RESPIRATORY (INHALATION) at 14:54

## 2025-06-24 RX ADMIN — GABAPENTIN 900 MG: 300 CAPSULE ORAL at 10:12

## 2025-06-24 RX ADMIN — GUAIFENESIN 600 MG: 600 TABLET, EXTENDED RELEASE ORAL at 10:13

## 2025-06-24 RX ADMIN — GUAIFENESIN 600 MG: 600 TABLET, EXTENDED RELEASE ORAL at 20:55

## 2025-06-24 RX ADMIN — METOPROLOL SUCCINATE 25 MG: 25 TABLET, EXTENDED RELEASE ORAL at 10:12

## 2025-06-24 RX ADMIN — MELATONIN 3 MG: at 20:56

## 2025-06-24 RX ADMIN — DULOXETINE 60 MG: 30 CAPSULE, DELAYED RELEASE ORAL at 10:12

## 2025-06-24 RX ADMIN — IPRATROPIUM BROMIDE AND ALBUTEROL SULFATE 1 DOSE: .5; 3 SOLUTION RESPIRATORY (INHALATION) at 21:22

## 2025-06-24 RX ADMIN — SODIUM CHLORIDE, PRESERVATIVE FREE 10 ML: 5 INJECTION INTRAVENOUS at 20:56

## 2025-06-24 RX ADMIN — PANTOPRAZOLE SODIUM 40 MG: 40 TABLET, DELAYED RELEASE ORAL at 06:01

## 2025-06-24 RX ADMIN — GABAPENTIN 1200 MG: 300 CAPSULE ORAL at 20:55

## 2025-06-24 RX ADMIN — AZITHROMYCIN 250 MG: 250 TABLET, FILM COATED ORAL at 10:12

## 2025-06-24 RX ADMIN — KETOROLAC TROMETHAMINE 15 MG: 30 INJECTION, SOLUTION INTRAMUSCULAR at 04:54

## 2025-06-24 RX ADMIN — WATER 1000 MG: 1 INJECTION INTRAMUSCULAR; INTRAVENOUS; SUBCUTANEOUS at 20:55

## 2025-06-24 RX ADMIN — IPRATROPIUM BROMIDE AND ALBUTEROL SULFATE 1 DOSE: .5; 3 SOLUTION RESPIRATORY (INHALATION) at 04:13

## 2025-06-24 RX ADMIN — KETOROLAC TROMETHAMINE 15 MG: 30 INJECTION, SOLUTION INTRAMUSCULAR at 20:56

## 2025-06-24 RX ADMIN — KETOROLAC TROMETHAMINE 15 MG: 30 INJECTION, SOLUTION INTRAMUSCULAR at 14:46

## 2025-06-24 RX ADMIN — TIZANIDINE 4 MG: 4 TABLET ORAL at 20:55

## 2025-06-24 RX ADMIN — ATORVASTATIN CALCIUM 40 MG: 40 TABLET, FILM COATED ORAL at 20:56

## 2025-06-24 RX ADMIN — ASPIRIN 81 MG: 81 TABLET, CHEWABLE ORAL at 10:12

## 2025-06-24 RX ADMIN — SODIUM CHLORIDE, PRESERVATIVE FREE 10 ML: 5 INJECTION INTRAVENOUS at 10:00

## 2025-06-24 RX ADMIN — IPRATROPIUM BROMIDE AND ALBUTEROL SULFATE 1 DOSE: .5; 3 SOLUTION RESPIRATORY (INHALATION) at 08:06

## 2025-06-24 ASSESSMENT — PAIN SCALES - GENERAL
PAINLEVEL_OUTOF10: 6
PAINLEVEL_OUTOF10: 6
PAINLEVEL_OUTOF10: 7

## 2025-06-24 ASSESSMENT — PAIN DESCRIPTION - LOCATION
LOCATION: ABDOMEN;HAND
LOCATION: ABDOMEN;HAND
LOCATION: RIB CAGE

## 2025-06-24 ASSESSMENT — PAIN DESCRIPTION - DESCRIPTORS
DESCRIPTORS: ACHING
DESCRIPTORS: ACHING;THROBBING

## 2025-06-24 ASSESSMENT — PAIN DESCRIPTION - ORIENTATION
ORIENTATION: LEFT
ORIENTATION: ANTERIOR;LEFT;RIGHT;MID

## 2025-06-24 NOTE — CARE COORDINATION
Care Management Initial Assessment       RUR: 10%  Readmission? No  1st IM letter given? No  1st  letter given: No     Chart reviewed. CM met with pt at the bedside to introduce self and role. Verified contact information and demographics. Pt resides with her sister and brother in law in a one level home with 3 KIRSTEN. Pt is independent with ADLs/IADLs and ambulatory without a device. 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 She does not have home oxygen- but has home cpap.  Preferred pharmacy is [facility]. Last saw PCP within the last three months. Her sister will transport home at time of d/c. Will continue to follow.       06/24/25 1602   Service Assessment   Patient Orientation Alert and Oriented   Cognition Alert   History Provided By Patient   Primary Caregiver Self   Support Systems Family Members   Patient's Healthcare Decision Maker is: Legal Next of Kin   PCP Verified by CM Yes  (Hue Montanez)   Last Visit to PCP Within last 3 months   Prior Functional Level Independent in ADLs/IADLs   Current Functional Level Independent in ADLs/IADLs   Can patient return to prior living arrangement Yes   Ability to make needs known: Good   Family able to assist with home care needs: Yes   Would you like for me to discuss the discharge plan with any other family members/significant others, and if so, who? No   Financial Resources Other (Comment)  (Bogdan STEARNS)   Social/Functional History   Lives With Family   Type of Home House   Home Layout One level   Home Access Stairs to enter with rails   Entrance Stairs - Number of Steps 3   Bathroom Accessibility Accessible   Home Equipment None   Receives Help From Family   Prior Level of Assist for ADLs Independent   Prior Level of Assist for Homemaking Independent   Ambulation Assistance Independent   Prior Level of Assist for Transfers Independent   Active  Yes   Occupation Self employed   Type of Occupation conference speaker

## 2025-06-24 NOTE — PROGRESS NOTES
Cardiology Progress Note  6/24/2025     Admit Date: 6/22/2025  Admit Diagnosis: Acute coronary syndrome (HCC) [I24.9]  Multifocal pneumonia [J18.9]  CC: none currently  Cardiologist:  Dr Velazco.   Cardiac Assessment/Plan:    HTN  Arthritis - RA  Fibromyalgia   Anxiety   GERD  Pedal edema, minimal   TIA: Chronic DAPT     Admitted w/ fever; hypoxia, pulm infiltrates/productive cough & hemoptysis for last 2 days.  Na 127; K 2.3; Trop upto 600s; LBBB noted     Current cardiac meds 6/23: asa/plavix; lipitor 40; clonidine 0.1 qhs;      IMP 6/23: type II NQWMI; cont supportive care     Rec 6/23: F/U echo; future cath vs MPI dep on clinical course.  Change clonidine to toprol XL  With hemoptysis, no role for invasive cardiac Rx currently;   Hold plavix for now. (?pulm hemorrhage: eval per primary team).     6/24: No CP; unchanged dyspnea; on higher O2; +/- less hemoptysis per pt.  -150; HR 70-90s; sinus; 96% 6L;   K 3.3; Cr 0.6; Nl WBC; Ht 9.8    Cardiac meds: asa; toprol XL 25; lipitor 40. K 40 x1    No new cardiac recs; Dr Velazco is back tomorrow.      High complexity decision making was performed  X Yes   High-risk of decompensation with multiple organ involvement X Yes         Hospital Problem List:  Principal Problem:    Multifocal pneumonia  Resolved Problems:    * No resolved hospital problems. *      ____________________________________________________________________  Sheila Prescott is a 64 y.o. female presents with Acute coronary syndrome (HCC) [I24.9]  Multifocal pneumonia [J18.9].     As noted in H&P: \"64 y.o.  female with PMHx as listed below presenting to the emergency department accompanied by sister with complaints of generalized malaise, arthralgia/myalgia, hoarse voice, frequent cough productive of mucinous sputum, and subjective fevers and chills.  Patient reports symptoms began Friday and have progressively worsened over the course the day now associated with shortness of

## 2025-06-24 NOTE — RT PROTOCOL NOTE
Called to Patient room to give breathing treatment. Patient in no distress, lungs sound clear.. Treatment not needed at this time.

## 2025-06-24 NOTE — PLAN OF CARE
Problem: Discharge Planning  Goal: Discharge to home or other facility with appropriate resources  6/24/2025 0020 by Bob Harvey RN  Outcome: Progressing  6/23/2025 1622 by Melanie Carolina LPN  Outcome: Progressing     Problem: Safety - Adult  Goal: Free from fall injury  6/24/2025 0020 by Bob Harvey RN  Outcome: Progressing  6/23/2025 1622 by Melanie Carolina LPN  Outcome: Progressing     Problem: Respiratory - Adult  Goal: Achieves optimal ventilation and oxygenation  6/24/2025 0020 by Bob Harvey RN  Outcome: Progressing  6/23/2025 1622 by Melanie Carolina LPN  Outcome: Progressing     Problem: Gastrointestinal - Adult  Goal: Minimal or absence of nausea and vomiting  6/24/2025 0020 by Bob Harvey RN  Outcome: Progressing  6/23/2025 1622 by Melanie Carolina LPN  Outcome: Progressing  Goal: Maintains or returns to baseline bowel function  6/24/2025 0020 by Bob Harvey RN  Outcome: Progressing  6/23/2025 1622 by Melanie Carolina LPN  Outcome: Progressing     Problem: Infection - Adult  Goal: Absence of infection at discharge  6/24/2025 0020 by Bob Harvey RN  Outcome: Progressing  6/23/2025 1622 by Melanie Carolina LPN  Outcome: Progressing  Goal: Absence of infection during hospitalization  6/24/2025 0020 by Bob Harvey RN  Outcome: Progressing  6/23/2025 1622 by Melanie Carolina LPN  Outcome: Progressing     Problem: Pain  Goal: Verbalizes/displays adequate comfort level or baseline comfort level  Outcome: Progressing

## 2025-06-24 NOTE — PROGRESS NOTES
Hospitalist Progress Note    NAME:   Sheila Prescott   : 1960   MRN: [id number]     Date/Time: 2025 2:33 PM  Patient PCP: Hue Montanez MD    Estimated discharge date:   Barriers: Respiratory status improvement, O2 weaned down, cardiac      Assessment / Plan:  Acute hypoxemic respiratory failure  Hemoptysis  Multifocal pneumonia  Sepsis secondary to above  Respiratory viral panel negative  Culture sputum unremarkable  Legionella urinary antigens pending  Mycoplasma serologies pending  Continue empiric ceftriaxone azithromycin for 5-day course  Start guaifenesin  As needed Toradol for refractory fevers/pain  CT chest showed:Bilateral airspace infiltrates compatible with pneumonia with no  pulmonary embolus and probable reactive bilateral hilar and mediastinal  adenopathy for which follow-up is recommended.  Pulm consulted for hemoptysis, she has received 1 dose of Lovenox on admission(therapeutic)  On 6 LNC this afternoon        Severe hypokalemia/improving 2.3>3.3  Mild hypomagnesemia  Moderate hyponatremia 127>133  Repletion ordered-trend and replace further as indicated  Trend BMP     Elevated troponin-suspect demand ischemia  Essential hypertension  Hyperlipidemia  Trend troponin  Therapeutic Lovenox now out of abundance of caution  Full-strength aspirin given in ED  Cardiology consulted, greatly appreciate their expertise  Obtain TTE  Continue PTA aspirin, atorvastatin, Plavix  Continue PTA clonidine nightly  Labs: Hemoglobin A1c, lipid panel  Given current hemoptysis, Plavix held     Fibromyalgia  Continue PTA as needed gabapentin, duloxetine, Zanaflex nightly     MDD, ALICE  Continue PTA duloxetine     GERD  Formulary substitution Protonix        JUAN PABLO  Nocturnal BiPAP      Incidental findings left nephrolithiasis  CT scan showed nonobstructing left nephrolithiasis 5mm      Medical Decision Making:   I personally reviewed labs: Yes CBC, BMP  I personally reviewed imaging: Yes  I

## 2025-06-24 NOTE — CONSULTS
Pulmonary Consult Note            Name: Sheila Prescott   : 1960   MRN: [id number]   Date: 2025      Reason for Consult: Hemoptysis    Requesting Provider: Dr. Larissa Coreas    Mode of visit - In person       HPI:     Sheila Prescott is 64 y.o. lady with history of hypertension, sleep apnea, on CPAP therapy, history of pneumonia about 10 years ago and COVID few years ago, nephrolithiasis admitted earlier this week with fever, cough and bodyaches since last week.  Patient was febrile and hypoxic at admission.  She required up to 6 L oxygen, she is currently on 4 L.  Patient reports bloody sputum since last Saturday.  Initially it was thick and mucoid, now it is much thinner.   denies blood in urine,  no prior history of connective tissue disease. Patient is on Rocephin, azithromycin and Mucinex from respiratory standpoint.    Current labs show normal white count which was elevated at admission, mild to moderate anemia and mild hypokalemia.  Admission troponin was in 600s, it trended down.  Procalcitonin mildly elevated.  Blood cultures x 2 no growth so far. Respiratory viral PCR panel negative.  Sputum Gram stain showed 3+ WBCs, 2+ gram-positive cocci and 1+ gram variable rods.  Admission chest x-ray showed bilateral pulmonary infiltrates.  Chest CT shows dense infiltrates in bilateral lower lobes and right upper lobe along with mild hilar and mediastinal adenopathy.  There was no pulmonary embolism.    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    Management plan :    Hemoptysis and hypoxia with bilateral pulmonary infiltrates.  Hemodynamically stable.  Continue monitoring H&H   Acute hypoxia requiring up to 6 L, currently needing 4 L.  Titrate aiming for minimum O2 sat of 92%.  presumed

## 2025-06-24 NOTE — PROGRESS NOTES
Consult received. Full consult note to follow.      Arianne Reyes MD  Pulmonology  [place], [place]  [phone]  6/24/2025

## 2025-06-24 NOTE — PROGRESS NOTES
End of Shift Note    Bedside shift change report given to Melanie ENCARNACION (oncoming nurse) by Bob Harvey RN (offgoing nurse).  Report included the following information SBAR, ED Summary, Intake/Output, MAR, Recent Results, Med Rec Status, Cardiac Rhythm NSR, and Alarm Parameters     Shift worked:  Night     Shift summary and any significant changes:    Patient slept fairly, complaints of left rib pain, relief with PRN toradol.  At 0316 am patient SpO2 went down to 83% on O2 5L and BiPAP, patient complaints of SOB, called RT for breathing treatment, increase O2 to 6L. Felt better afterwards SpO2 now at 92%.  Caring rounds done, bath and linen changed done.  Plan of care ongoing.      Concerns for physician to address:       Zone phone for oncoming shift:          Activity:  Level of Assistance: Independent  Number times ambulated in hallways past shift: 0  Number of times OOB to chair past shift: 1    Cardiac:   Cardiac Monitoring: Yes      Cardiac Rhythm: Sinus rhythm    Access:  Current line(s): PIV     Genitourinary:        Respiratory:   O2 Device: Nasal cannula  Chronic home O2 use?: NO  Incentive spirometer at bedside: NO    GI:  Last BM (including prior to admit): 06/20/25  Current diet:  ADULT DIET; Regular; 4 carb choices (60 gm/meal); Low Fat/Low Chol/High Fiber/ARMIDA  Passing flatus: YES    Pain Management:   Patient states pain is manageable on current regimen: N/A    Skin:  James Scale Score: 22  Interventions: Wound Offloading (Prevention Methods): Pillows, Repositioning    Patient Safety:  Fall Risk: Nursing Judgement-Fall Risk High(Add Comments): No  Fall Risk Interventions  Nursing Judgement-Fall Risk High(Add Comments): No  Toilet Every 2 Hours-In Advance of Need: Yes  Hourly Visual Checks: Awake, In bed  Fall Visual Posted: Armband, Socks  Room Door Open: Deferred for droplet isolation  Alarm On: Bed  Patient Moved Closer to Nursing Station: No    Active Consults:   IP CONSULT TO  CARDIOLOGY    Length of Stay:  Expected LOS: 3  Actual LOS: 2    Bob Harvey RN

## 2025-06-24 NOTE — PROGRESS NOTES
End of Shift Note    Bedside shift change report given to FABIENNE Coffman (oncoming nurse) by Melanie Carolina LPN (offgoing nurse).  Report included the following information SBAR    Shift worked:  0700 - 1930     Shift summary and any significant changes:     No acute events throughout shift. Pulm consulted and came to see pt, new orders in. RT at the pt's bedside x 2 today for neb treatments and to re-educate pt on proper use of the flutter valve. O2 titrated down to 4L/Min via NC today, pt tolerating well. Pt resting comfortably in chair upon shift completion, all concerns addressed.      Concerns for physician to address:  None      Zone phone for oncoming shift:          Activity:  Level of Assistance: Independent  Number times ambulated in hallways past shift: 0  Number of times OOB to chair past shift: 2    Cardiac:   Cardiac Monitoring: Yes      Cardiac Rhythm: Sinus rhythm    Access:  Current line(s): PIV    Genitourinary:        Respiratory:   O2 Device: Nasal cannula  Chronic home O2 use?: NO  Incentive spirometer at bedside: YES    GI:  Last BM (including prior to admit): 06/23/25 (normal per pt)  Current diet:  ADULT DIET; Regular; 4 carb choices (60 gm/meal); Low Fat/Low Chol/High Fiber/ARMIDA  Passing flatus: YES    Pain Management:   Patient states pain is manageable on current regimen: YES    Skin:  James Scale Score: 22  Interventions: Wound Offloading (Prevention Methods): Pillows, Repositioning, Turning    Patient Safety:  Fall Risk: Nursing Judgement-Fall Risk High(Add Comments): No  Fall Risk Interventions  Nursing Judgement-Fall Risk High(Add Comments): No  Toilet Every 2 Hours-In Advance of Need: Yes  Hourly Visual Checks: Awake, In bed  Fall Visual Posted: Armband, Socks  Room Door Open: No (Comment)  Alarm On: Other (Comment)  Patient Moved Closer to Nursing Station: No    Active Consults:   IP CONSULT TO CARDIOLOGY    Length of Stay:  Expected LOS: 4  Actual LOS: 2    Melanie Carolina

## 2025-06-25 ENCOUNTER — APPOINTMENT (OUTPATIENT)
Facility: HOSPITAL | Age: 65
End: 2025-06-25
Attending: STUDENT IN AN ORGANIZED HEALTH CARE EDUCATION/TRAINING PROGRAM
Payer: COMMERCIAL

## 2025-06-25 LAB
ANION GAP SERPL CALC-SCNC: 5 MMOL/L (ref 2–12)
BUN SERPL-MCNC: 15 MG/DL (ref 6–20)
BUN/CREAT SERPL: 25 (ref 12–20)
CALCIUM SERPL-MCNC: 9.2 MG/DL (ref 8.5–10.1)
CHLORIDE SERPL-SCNC: 108 MMOL/L (ref 97–108)
CO2 SERPL-SCNC: 26 MMOL/L (ref 21–32)
CREAT SERPL-MCNC: 0.59 MG/DL (ref 0.55–1.02)
CRP SERPL-MCNC: 14.2 MG/DL (ref 0–0.3)
ECHO AO ROOT DIAM: 2.9 CM
ECHO AO ROOT INDEX: 1.27 CM/M2
ECHO AV AREA PEAK VELOCITY: 1.2 CM2
ECHO AV AREA/BSA PEAK VELOCITY: 0.5 CM2/M2
ECHO AV PEAK GRADIENT: 9 MMHG
ECHO AV PEAK VELOCITY: 1.5 M/S
ECHO AV VELOCITY RATIO: 0.8
ECHO BSA: 2.29 M2
ECHO LA DIAMETER INDEX: 1.93 CM/M2
ECHO LA DIAMETER: 4.4 CM
ECHO LA TO AORTIC ROOT RATIO: 1.52
ECHO LV EDV A2C: 153 ML
ECHO LV EDV A4C: 99 ML
ECHO LV EDV BP: 127 ML (ref 56–104)
ECHO LV EDV INDEX A4C: 43 ML/M2
ECHO LV EDV INDEX BP: 56 ML/M2
ECHO LV EDV NDEX A2C: 67 ML/M2
ECHO LV EF PHYSICIAN: 55 %
ECHO LV EJECTION FRACTION A2C: 62 %
ECHO LV EJECTION FRACTION A4C: 53 %
ECHO LV EJECTION FRACTION BIPLANE: 57 % (ref 55–100)
ECHO LV ESV A2C: 59 ML
ECHO LV ESV A4C: 46 ML
ECHO LV ESV BP: 54 ML (ref 19–49)
ECHO LV ESV INDEX A2C: 26 ML/M2
ECHO LV ESV INDEX A4C: 20 ML/M2
ECHO LV ESV INDEX BP: 24 ML/M2
ECHO LV FRACTIONAL SHORTENING: 36 % (ref 28–44)
ECHO LV INTERNAL DIMENSION DIASTOLE INDEX: 2.41 CM/M2
ECHO LV INTERNAL DIMENSION DIASTOLIC: 5.5 CM (ref 3.9–5.3)
ECHO LV INTERNAL DIMENSION SYSTOLIC INDEX: 1.54 CM/M2
ECHO LV INTERNAL DIMENSION SYSTOLIC: 3.5 CM
ECHO LV IVSD: 1 CM (ref 0.6–0.9)
ECHO LV MASS 2D: 227.4 G (ref 67–162)
ECHO LV MASS INDEX 2D: 99.7 G/M2 (ref 43–95)
ECHO LV POSTERIOR WALL DIASTOLIC: 1.1 CM (ref 0.6–0.9)
ECHO LV RELATIVE WALL THICKNESS RATIO: 0.4
ECHO LVOT AREA: 1.5 CM2
ECHO LVOT DIAM: 1.4 CM
ECHO LVOT PEAK GRADIENT: 6 MMHG
ECHO LVOT PEAK VELOCITY: 1.2 M/S
ECHO MV A VELOCITY: 0.85 M/S
ECHO MV E DECELERATION TIME (DT): 166.6 MS
ECHO MV E VELOCITY: 1.1 M/S
ECHO MV E/A RATIO: 1.29
ECHO RV INTERNAL DIMENSION: 3.5 CM
ECHO RV TAPSE: 1.7 CM (ref 1.7–?)
ECHO TV REGURGITANT MAX VELOCITY: 2.13 M/S
ECHO TV REGURGITANT PEAK GRADIENT: 18 MMHG
ERYTHROCYTE [DISTWIDTH] IN BLOOD BY AUTOMATED COUNT: 13.3 % (ref 11.5–14.5)
ERYTHROCYTE [SEDIMENTATION RATE] IN BLOOD: 85 MM/HR (ref 0–30)
GLUCOSE SERPL-MCNC: 107 MG/DL (ref 65–100)
HCT VFR BLD AUTO: 31.3 % (ref 35–47)
HGB BLD-MCNC: 9.8 G/DL (ref 11.5–16)
L PNEUMO1 AG UR QL IA: NEGATIVE
MCH RBC QN AUTO: 29.5 PG (ref 26–34)
MCHC RBC AUTO-ENTMCNC: 31.3 G/DL (ref 30–36.5)
MCV RBC AUTO: 94.3 FL (ref 80–99)
NRBC # BLD: 0 K/UL (ref 0–0.01)
NRBC BLD-RTO: 0 PER 100 WBC
PLATELET # BLD AUTO: 208 K/UL (ref 150–400)
PMV BLD AUTO: 10.2 FL (ref 8.9–12.9)
POTASSIUM SERPL-SCNC: 3.9 MMOL/L (ref 3.5–5.1)
PROCALCITONIN SERPL-MCNC: 0.12 NG/ML
RBC # BLD AUTO: 3.32 M/UL (ref 3.8–5.2)
SODIUM SERPL-SCNC: 139 MMOL/L (ref 136–145)
SPECIMEN SOURCE: NORMAL
WBC # BLD AUTO: 6.6 K/UL (ref 3.6–11)

## 2025-06-25 PROCEDURE — 6370000000 HC RX 637 (ALT 250 FOR IP): Performed by: STUDENT IN AN ORGANIZED HEALTH CARE EDUCATION/TRAINING PROGRAM

## 2025-06-25 PROCEDURE — 94761 N-INVAS EAR/PLS OXIMETRY MLT: CPT

## 2025-06-25 PROCEDURE — 85652 RBC SED RATE AUTOMATED: CPT

## 2025-06-25 PROCEDURE — 2500000003 HC RX 250 WO HCPCS: Performed by: STUDENT IN AN ORGANIZED HEALTH CARE EDUCATION/TRAINING PROGRAM

## 2025-06-25 PROCEDURE — 84145 PROCALCITONIN (PCT): CPT

## 2025-06-25 PROCEDURE — 6360000002 HC RX W HCPCS: Performed by: STUDENT IN AN ORGANIZED HEALTH CARE EDUCATION/TRAINING PROGRAM

## 2025-06-25 PROCEDURE — 2700000000 HC OXYGEN THERAPY PER DAY

## 2025-06-25 PROCEDURE — 6370000000 HC RX 637 (ALT 250 FOR IP): Performed by: INTERNAL MEDICINE

## 2025-06-25 PROCEDURE — 86140 C-REACTIVE PROTEIN: CPT

## 2025-06-25 PROCEDURE — 94640 AIRWAY INHALATION TREATMENT: CPT

## 2025-06-25 PROCEDURE — 80048 BASIC METABOLIC PNL TOTAL CA: CPT

## 2025-06-25 PROCEDURE — 85027 COMPLETE CBC AUTOMATED: CPT

## 2025-06-25 PROCEDURE — 1100000003 HC PRIVATE W/ TELEMETRY

## 2025-06-25 PROCEDURE — 93308 TTE F-UP OR LMTD: CPT

## 2025-06-25 PROCEDURE — 36415 COLL VENOUS BLD VENIPUNCTURE: CPT

## 2025-06-25 RX ORDER — AZITHROMYCIN 250 MG/1
500 TABLET, FILM COATED ORAL DAILY
Status: COMPLETED | OUTPATIENT
Start: 2025-06-26 | End: 2025-06-28

## 2025-06-25 RX ADMIN — GABAPENTIN 1200 MG: 300 CAPSULE ORAL at 20:41

## 2025-06-25 RX ADMIN — WATER 1000 MG: 1 INJECTION INTRAMUSCULAR; INTRAVENOUS; SUBCUTANEOUS at 20:42

## 2025-06-25 RX ADMIN — IPRATROPIUM BROMIDE AND ALBUTEROL SULFATE 1 DOSE: .5; 3 SOLUTION RESPIRATORY (INHALATION) at 15:50

## 2025-06-25 RX ADMIN — IPRATROPIUM BROMIDE AND ALBUTEROL SULFATE 1 DOSE: .5; 3 SOLUTION RESPIRATORY (INHALATION) at 08:40

## 2025-06-25 RX ADMIN — ASPIRIN 81 MG: 81 TABLET, CHEWABLE ORAL at 08:57

## 2025-06-25 RX ADMIN — KETOROLAC TROMETHAMINE 15 MG: 30 INJECTION, SOLUTION INTRAMUSCULAR at 20:42

## 2025-06-25 RX ADMIN — ACETAMINOPHEN 650 MG: 325 TABLET ORAL at 08:56

## 2025-06-25 RX ADMIN — KETOROLAC TROMETHAMINE 15 MG: 30 INJECTION, SOLUTION INTRAMUSCULAR at 08:56

## 2025-06-25 RX ADMIN — IPRATROPIUM BROMIDE AND ALBUTEROL SULFATE 1 DOSE: .5; 3 SOLUTION RESPIRATORY (INHALATION) at 12:18

## 2025-06-25 RX ADMIN — GABAPENTIN 900 MG: 300 CAPSULE ORAL at 08:57

## 2025-06-25 RX ADMIN — PANTOPRAZOLE SODIUM 40 MG: 40 TABLET, DELAYED RELEASE ORAL at 05:38

## 2025-06-25 RX ADMIN — GUAIFENESIN 600 MG: 600 TABLET, EXTENDED RELEASE ORAL at 08:57

## 2025-06-25 RX ADMIN — ATORVASTATIN CALCIUM 40 MG: 40 TABLET, FILM COATED ORAL at 20:42

## 2025-06-25 RX ADMIN — GUAIFENESIN 600 MG: 600 TABLET, EXTENDED RELEASE ORAL at 20:41

## 2025-06-25 RX ADMIN — AZITHROMYCIN 250 MG: 250 TABLET, FILM COATED ORAL at 08:57

## 2025-06-25 RX ADMIN — METOPROLOL SUCCINATE 25 MG: 25 TABLET, EXTENDED RELEASE ORAL at 08:57

## 2025-06-25 RX ADMIN — TIZANIDINE 4 MG: 4 TABLET ORAL at 20:42

## 2025-06-25 RX ADMIN — MELATONIN 3 MG: at 20:42

## 2025-06-25 RX ADMIN — SODIUM CHLORIDE, PRESERVATIVE FREE 10 ML: 5 INJECTION INTRAVENOUS at 20:43

## 2025-06-25 RX ADMIN — IPRATROPIUM BROMIDE AND ALBUTEROL SULFATE 1 DOSE: .5; 3 SOLUTION RESPIRATORY (INHALATION) at 20:35

## 2025-06-25 RX ADMIN — DULOXETINE 60 MG: 30 CAPSULE, DELAYED RELEASE ORAL at 08:57

## 2025-06-25 RX ADMIN — SODIUM CHLORIDE, PRESERVATIVE FREE 10 ML: 5 INJECTION INTRAVENOUS at 08:57

## 2025-06-25 ASSESSMENT — PAIN SCALES - GENERAL
PAINLEVEL_OUTOF10: 5
PAINLEVEL_OUTOF10: 3
PAINLEVEL_OUTOF10: 3

## 2025-06-25 ASSESSMENT — PAIN DESCRIPTION - DESCRIPTORS
DESCRIPTORS: ACHING
DESCRIPTORS: ACHING

## 2025-06-25 ASSESSMENT — PAIN DESCRIPTION - LOCATION
LOCATION: HEAD
LOCATION: HEAD

## 2025-06-25 NOTE — PROGRESS NOTES
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     Management plan :     Hemoptysis and hypoxia with bilateral pulmonary infiltrates.  Hemodynamics and.  H&H stable.  Acute hypoxia requiring up to 6 L, currently needing 3 L.  Titrate aiming for minimum O2 sat of 92%.  Clinically improving, elevated inflammatory markers, procalcitonin trending down.  Follow-up on sputum cultures and urine Legionella antigen.  Continue Rocephin and azithromycin.  Dense pneumonia, recommend at least 1 week of antibiotics.  Obtain chest x-ray in the morning  Continue DuoNeb nebulizations  Incentive spirometry and PEP therapy.  Continue CPAP therapy with sleep.      Medical Decision Making: High complexity, high amount of data and moderate risk management       Subjective:    Interval History:    6/25-cough and minimal hemoptysis improving.  Currently on O2 3 LPM.  Procalcitonin trending down.  CRP > 14, sed rate 85.  Mycoplasma workup negative.  H&H stable    HPI  Sheila Prescott is 64 y.o. lady with history of hypertension, sleep apnea, on CPAP therapy, history of pneumonia about 10 years ago and COVID few years ago, nephrolithiasis admitted earlier this week with fever, cough and bodyaches since last week.  Patient was febrile and hypoxic at admission.  She required up to 6 L oxygen, she is currently on 4 L.  Patient reports bloody sputum since last Saturday.  Initially it was thick and mucoid, now it is much thinner.   denies blood in urine,  no prior history of

## 2025-06-25 NOTE — PROGRESS NOTES
Progress Note      6/25/2025 11:15 AM  NAME: Sheila Prescott   MRN:  [id number]   Admit Diagnosis: Acute coronary syndrome (HCC) [I24.9]  Multifocal pneumonia [J18.9]     Primary Cardiologist:  ROSEANN Velazco      Assessment:     HTN  Arthritis - RA  Fibromyalgia   Anxiety   GERD  Pedal edema, minimal   TIA: Chronic DAPT     Admitted w/ fever; hypoxia, pulm infiltrates/productive cough & hemoptysis for last 2 days.    Type 2 NSTEMI  Hypoxic respiratory failure   Multifocal PNA, Hemoptysis   Sepsis       Na 127; K 2.3; Trop upto 600s; LBBB noted  cardiac meds 6/23: asa/plavix; lipitor 40; clonidine 0.1 qhs;   IMP: type II NQWMI; cont supportive care        CTA  Bilateral airspace infiltrates compatible with pneumonia with no  pulmonary embolus and probable reactive bilateral hilar and mediastinal  adenopathy for which follow-up is recommended. Hepatic steatosis, nonobstructing  left nephrolithiasis, and additional incidentals as above.    Trop 639 > 383        Recommendations:     Cont aspirin   Cont lipitor   Cont metoprolol   Cont to hold plavix   Abx per primary team, pulmonary following   Echo unremarkable   Will consider OP stress test to eval for underlying CAD          [x]        High complexity decision making was performed    Subjective:     HPI:   No CP  Dyspnea improving   On less O2       ROS: No CP, SOB, Abd pain, nausea, vomiting, syncope, palpitations, new focal neurological symptoms     Objective:      Physical Exam:    Last 24hrs VS reviewed since prior progress note. Most recent are:    /61   Pulse 71   Temp 98.2 °F (36.8 °C) (Oral)   Resp 20   Ht 1.727 m (5' 8\")   Wt 117.8 kg (259 lb 11.2 oz)   SpO2 95%   BMI 39.49 kg/m²     Intake/Output Summary (Last 24 hours) at 6/25/2025 1115  Last data filed at 6/25/2025 0313  Gross per 24 hour   Intake 440 ml   Output --   Net 440 ml          General: Alert and oriented x3, no acute distress   Neck: Supple   Respiratory: No respiratory

## 2025-06-25 NOTE — PLAN OF CARE
Problem: Discharge Planning  Goal: Discharge to home or other facility with appropriate resources  6/25/2025 1046 by Luz Greene RN  Outcome: Progressing  6/24/2025 2155 by Drake Spaulding RN  Outcome: Progressing     Problem: Discharge Planning  Goal: Discharge to home or other facility with appropriate resources  6/25/2025 1046 by Luz Greene RN  Outcome: Progressing  6/24/2025 2155 by Drake Spaulding RN  Outcome: Progressing     Problem: Safety - Adult  Goal: Free from fall injury  6/25/2025 1046 by Luz Greene RN  Outcome: Progressing  6/24/2025 2155 by Drake Spaulding RN  Outcome: Progressing     Problem: Safety - Adult  Goal: Free from fall injury  6/25/2025 1046 by Luz Greene RN  Outcome: Progressing  6/24/2025 2155 by Drake Spaulding RN  Outcome: Progressing     Problem: Respiratory - Adult  Goal: Achieves optimal ventilation and oxygenation  6/25/2025 1046 by Luz Greene RN  Outcome: Progressing  6/25/2025 0851 by Tanya Busch, RT  Outcome: Progressing  6/24/2025 2308 by Aleksandra Winkler RCP  Outcome: Progressing  6/24/2025 2155 by Drake Spaulding RN  Outcome: Progressing     Problem: Respiratory - Adult  Goal: Achieves optimal ventilation and oxygenation  6/25/2025 1046 by Luz Greene RN  Outcome: Progressing  6/25/2025 0851 by Tanya Busch, RT  Outcome: Progressing  6/24/2025 2308 by Aleksandra Winkler RCP  Outcome: Progressing  6/24/2025 2155 by Drake Spaulding RN  Outcome: Progressing     Problem: Gastrointestinal - Adult  Goal: Minimal or absence of nausea and vomiting  6/25/2025 1046 by Luz Greene RN  Outcome: Progressing  6/24/2025 2155 by Drake Spaulding RN  Outcome: Progressing  Goal: Maintains or returns to baseline bowel function  6/25/2025 1046 by Luz Greene RN  Outcome: Progressing  6/24/2025 2155 by Drake Spaulding RN  Outcome: Progressing     Problem: Gastrointestinal - Adult  Goal: Minimal or absence of nausea and

## 2025-06-25 NOTE — PROGRESS NOTES
End of Shift Note    Bedside shift change report given to FABIENNE Escobar (oncoming nurse) by Drake Spaulding RN (offgoing nurse).  Report included the following information SBAR, Kardex, Intake/Output, MAR, Recent Results, and Quality Measures    Shift worked:  7P-7A     Shift summary and any significant changes:     Scheduled medication given.Pt complained of pain and Toradol X 1 was given. Hourly caring rounding done. Plan of care ongoing.     Concerns for physician to address:       Zone phone for oncoming shift:          Activity:  Level of Assistance: Independent  Number times ambulated in hallways past shift: 0  Number of times OOB to chair past shift: 0    Cardiac:   Cardiac Monitoring: Yes      Cardiac Rhythm: Sinus rhythm    Access:  Current line(s): PIV     Genitourinary:        Respiratory:   O2 Device: Nasal cannula  Chronic home O2 use?: NO  Incentive spirometer at bedside: NO    GI:  Last BM (including prior to admit): 06/23/25  Current diet:  ADULT DIET; Regular; 4 carb choices (60 gm/meal); Low Fat/Low Chol/High Fiber/ARMIDA  Passing flatus: YES    Pain Management:   Patient states pain is manageable on current regimen: YES    Skin:  James Scale Score: 22  Interventions: Wound Offloading (Prevention Methods): Pillows, Repositioning, Turning    Patient Safety:  Fall Risk: Nursing Judgement-Fall Risk High(Add Comments): No  Fall Risk Interventions  Nursing Judgement-Fall Risk High(Add Comments): No  Toilet Every 2 Hours-In Advance of Need: Yes  Hourly Visual Checks: In chair  Fall Visual Posted: Armband, Socks  Room Door Open: No (Comment)  Alarm On: Other (Comment)  Patient Moved Closer to Nursing Station: No    Active Consults:   IP CONSULT TO CARDIOLOGY    Length of Stay:  Expected LOS: 4  Actual LOS: 3    Drake Spaulding RN

## 2025-06-25 NOTE — PROGRESS NOTES
Hospitalist Progress Note    NAME:   Sheila Prescott   : 1960   MRN: [id number]     Date/Time: 2025 2:44 PM  Patient PCP: Hue Montanez MD    Estimated discharge date:   Barriers: Respiratory status improvement, O2 weaned down, cardiac      Assessment / Plan:    Acute hypoxemic respiratory failure  Hemoptysis  Multifocal pneumonia  Sepsis secondary to above  Respiratory viral panel negative  Culture sputum Staph aureus versus normal portia  Legionella urinary antigens pending  Mycoplasma serologies pending  Continue empiric ceftriaxone azithromycin  Pulmonary following and appreciate recommendations  Will need repeat CT chest in 4 weeks to ensure resolution of pneumonia and also adenopathy  Currently on 3 L nasal cannula and is being weaned as tolerated  Symptomatic management  Check CBC and BMP in a.m. tomorrow             Mild troponin elevation due to demand ischemia  Essential hypertension  Hyperlipidemia  Troponin peaked at 600 and currently trending down  Seen by cardiology and recommendations noted  Continue aspirin, Toprol and Lipitor  Will discuss further recommendations with cardiology  LDL is 40 and hemoglobin A1c is 5.6       Fibromyalgia  Continue PTA as needed gabapentin, duloxetine, Zanaflex nightly     MDD, ALICE  Continue PTA duloxetine     GERD  Formulary substitution Protonix        JUAN PABLO  Nocturnal BiPAP      Incidental findings left nephrolithiasis  CT scan showed nonobstructing left nephrolithiasis 5mm.  Outpatient follow-up      Medical Decision Making:   I personally reviewed labs: CBC, BMP  I personally reviewed imaging: Chest x-ray  I personally reviewed EKG: Telemetry  Toxic drug monitoring: Yes  Discussed case with: Pharmacy pharmacy        Code Status: Full code  DVT Prophylaxis: SCDs due to hemoptysis    Subjective:     Chief Complaint / Reason for Physician Visit    Still reports shortness of breath along with some cough and phlegm.  No fever  Overnight

## 2025-06-25 NOTE — PLAN OF CARE
Problem: Discharge Planning  Goal: Discharge to home or other facility with appropriate resources  6/24/2025 2155 by Drake Spaulding RN  Outcome: Progressing  6/24/2025 1504 by Melanie Carolina LPN  Outcome: Progressing     Problem: Safety - Adult  Goal: Free from fall injury  6/24/2025 2155 by Drake Spaulding RN  Outcome: Progressing  6/24/2025 1504 by Melanie Carolina LPN  Outcome: Progressing     Problem: Respiratory - Adult  Goal: Achieves optimal ventilation and oxygenation  6/24/2025 2155 by Drake Spaulding RN  Outcome: Progressing  6/24/2025 1504 by Melanie Carolina LPN  Outcome: Progressing     Problem: Gastrointestinal - Adult  Goal: Minimal or absence of nausea and vomiting  6/24/2025 2155 by Drake Spaulding RN  Outcome: Progressing  6/24/2025 1504 by Melanie Carolina LPN  Outcome: Progressing  Goal: Maintains or returns to baseline bowel function  6/24/2025 2155 by Drake Spaulding RN  Outcome: Progressing  6/24/2025 1504 by Melanie Carolina LPN  Outcome: Progressing     Problem: Infection - Adult  Goal: Absence of infection at discharge  6/24/2025 2155 by Drake Spaulding RN  Outcome: Progressing  6/24/2025 1504 by Melanie Carolina LPN  Outcome: Progressing  Goal: Absence of infection during hospitalization  6/24/2025 2155 by Drake Spaulding RN  Outcome: Progressing  6/24/2025 1504 by Melanie Carolina LPN  Outcome: Progressing     Problem: Pain  Goal: Verbalizes/displays adequate comfort level or baseline comfort level  6/24/2025 2155 by Drake Spaulding RN  Outcome: Progressing  6/24/2025 1504 by Melanie Carolina LPN  Outcome: Progressing

## 2025-06-25 NOTE — PROGRESS NOTES
End of Shift Note    Bedside shift change report given to nurse Niaoe (oncoming nurse) by uLz Greene RN (offgoing nurse).  Report included the following information SBAR, Intake/Output, MAR, Recent Results, and Cardiac Rhythm NSR    Shift worked:  7A-7P     Shift summary and any significant changes:     A&O X4, able to make her needs known. 94%-95% IN 4L NC. All other Vss. WNL, no sign of distress noted at this time. Safety precautions in place, we'll continue to monitor.     Concerns for physician to address:  N/A     Zone phone for oncoming shift:   N/A       Activity:  Level of Assistance: Independent  Number times ambulated in hallways past shift: 0  Number of times OOB to chair past shift: 3    Cardiac:   Cardiac Monitoring: Yes      Cardiac Rhythm: Sinus rhythm    Access:  Current line(s): PIV     Genitourinary:        Respiratory:   O2 Device: Nasal cannula  Chronic home O2 use?: NO  Incentive spirometer at bedside: YES    GI:  Last BM (including prior to admit): 06/23/25  Current diet:  ADULT DIET; Regular; 4 carb choices (60 gm/meal); Low Fat/Low Chol/High Fiber/ARMIDA  Passing flatus: YES    Pain Management:   Patient states pain is manageable on current regimen: YES    Skin:  James Scale Score: 19  Interventions: Wound Offloading (Prevention Methods): Pillows, Repositioning, Turning    Patient Safety:  Fall Risk: Nursing Judgement-Fall Risk High(Add Comments): No  Fall Risk Interventions  Nursing Judgement-Fall Risk High(Add Comments): No  Toilet Every 2 Hours-In Advance of Need: Yes  Hourly Visual Checks: In bed, Eyes closed  Fall Visual Posted: Socks, Armband  Room Door Open: No (Comment)  Alarm On: Other (Comment)  Patient Moved Closer to Nursing Station: No    Active Consults:   IP CONSULT TO CARDIOLOGY    Length of Stay:  Expected LOS: 5  Actual LOS: 3    Luz Greene RN

## 2025-06-26 ENCOUNTER — APPOINTMENT (OUTPATIENT)
Facility: HOSPITAL | Age: 65
End: 2025-06-26
Payer: COMMERCIAL

## 2025-06-26 LAB
ANION GAP SERPL CALC-SCNC: 4 MMOL/L (ref 2–12)
BACTERIA SPEC CULT: ABNORMAL
BACTERIA SPEC CULT: ABNORMAL
BUN SERPL-MCNC: 13 MG/DL (ref 6–20)
BUN/CREAT SERPL: 20 (ref 12–20)
CALCIUM SERPL-MCNC: 9.2 MG/DL (ref 8.5–10.1)
CHLORIDE SERPL-SCNC: 108 MMOL/L (ref 97–108)
CO2 SERPL-SCNC: 28 MMOL/L (ref 21–32)
CREAT SERPL-MCNC: 0.66 MG/DL (ref 0.55–1.02)
ERYTHROCYTE [DISTWIDTH] IN BLOOD BY AUTOMATED COUNT: 13.4 % (ref 11.5–14.5)
GLUCOSE SERPL-MCNC: 101 MG/DL (ref 65–100)
GRAM STN SPEC: ABNORMAL
HCT VFR BLD AUTO: 31.2 % (ref 35–47)
HGB BLD-MCNC: 9.4 G/DL (ref 11.5–16)
MCH RBC QN AUTO: 28.5 PG (ref 26–34)
MCHC RBC AUTO-ENTMCNC: 30.1 G/DL (ref 30–36.5)
MCV RBC AUTO: 94.5 FL (ref 80–99)
NRBC # BLD: 0 K/UL (ref 0–0.01)
NRBC BLD-RTO: 0 PER 100 WBC
PLATELET # BLD AUTO: 236 K/UL (ref 150–400)
PMV BLD AUTO: 10 FL (ref 8.9–12.9)
POTASSIUM SERPL-SCNC: 3.8 MMOL/L (ref 3.5–5.1)
RBC # BLD AUTO: 3.3 M/UL (ref 3.8–5.2)
SERVICE CMNT-IMP: ABNORMAL
SODIUM SERPL-SCNC: 140 MMOL/L (ref 136–145)
WBC # BLD AUTO: 6.7 K/UL (ref 3.6–11)

## 2025-06-26 PROCEDURE — 1100000003 HC PRIVATE W/ TELEMETRY

## 2025-06-26 PROCEDURE — 80048 BASIC METABOLIC PNL TOTAL CA: CPT

## 2025-06-26 PROCEDURE — 6360000002 HC RX W HCPCS: Performed by: INTERNAL MEDICINE

## 2025-06-26 PROCEDURE — 71045 X-RAY EXAM CHEST 1 VIEW: CPT

## 2025-06-26 PROCEDURE — 6370000000 HC RX 637 (ALT 250 FOR IP): Performed by: INTERNAL MEDICINE

## 2025-06-26 PROCEDURE — 36415 COLL VENOUS BLD VENIPUNCTURE: CPT

## 2025-06-26 PROCEDURE — 94761 N-INVAS EAR/PLS OXIMETRY MLT: CPT

## 2025-06-26 PROCEDURE — 2700000000 HC OXYGEN THERAPY PER DAY

## 2025-06-26 PROCEDURE — 2500000003 HC RX 250 WO HCPCS: Performed by: STUDENT IN AN ORGANIZED HEALTH CARE EDUCATION/TRAINING PROGRAM

## 2025-06-26 PROCEDURE — 2500000003 HC RX 250 WO HCPCS: Performed by: INTERNAL MEDICINE

## 2025-06-26 PROCEDURE — 6370000000 HC RX 637 (ALT 250 FOR IP): Performed by: STUDENT IN AN ORGANIZED HEALTH CARE EDUCATION/TRAINING PROGRAM

## 2025-06-26 PROCEDURE — 94640 AIRWAY INHALATION TREATMENT: CPT

## 2025-06-26 PROCEDURE — 85027 COMPLETE CBC AUTOMATED: CPT

## 2025-06-26 RX ORDER — FLUCONAZOLE 100 MG/1
200 TABLET ORAL ONCE
Status: COMPLETED | OUTPATIENT
Start: 2025-06-26 | End: 2025-06-26

## 2025-06-26 RX ORDER — GUAIFENESIN 200 MG/10ML
200 LIQUID ORAL EVERY 4 HOURS PRN
Status: DISCONTINUED | OUTPATIENT
Start: 2025-06-26 | End: 2025-06-28 | Stop reason: HOSPADM

## 2025-06-26 RX ORDER — IPRATROPIUM BROMIDE AND ALBUTEROL SULFATE 2.5; .5 MG/3ML; MG/3ML
1 SOLUTION RESPIRATORY (INHALATION) EVERY 4 HOURS PRN
Status: DISCONTINUED | OUTPATIENT
Start: 2025-06-26 | End: 2025-06-28 | Stop reason: HOSPADM

## 2025-06-26 RX ADMIN — ASPIRIN 81 MG: 81 TABLET, CHEWABLE ORAL at 11:37

## 2025-06-26 RX ADMIN — GUAIFENESIN 600 MG: 600 TABLET, EXTENDED RELEASE ORAL at 11:37

## 2025-06-26 RX ADMIN — SODIUM CHLORIDE, PRESERVATIVE FREE 10 ML: 5 INJECTION INTRAVENOUS at 22:24

## 2025-06-26 RX ADMIN — IPRATROPIUM BROMIDE AND ALBUTEROL SULFATE 1 DOSE: .5; 3 SOLUTION RESPIRATORY (INHALATION) at 15:30

## 2025-06-26 RX ADMIN — FLUCONAZOLE 200 MG: 100 TABLET ORAL at 15:09

## 2025-06-26 RX ADMIN — MELATONIN 3 MG: at 22:22

## 2025-06-26 RX ADMIN — GABAPENTIN 900 MG: 300 CAPSULE ORAL at 11:37

## 2025-06-26 RX ADMIN — IPRATROPIUM BROMIDE AND ALBUTEROL SULFATE 1 DOSE: .5; 3 SOLUTION RESPIRATORY (INHALATION) at 12:01

## 2025-06-26 RX ADMIN — GUAIFENESIN 200 MG: 200 SOLUTION ORAL at 15:09

## 2025-06-26 RX ADMIN — SODIUM CHLORIDE, PRESERVATIVE FREE 10 ML: 5 INJECTION INTRAVENOUS at 11:38

## 2025-06-26 RX ADMIN — AZITHROMYCIN 500 MG: 250 TABLET, FILM COATED ORAL at 11:36

## 2025-06-26 RX ADMIN — GABAPENTIN 1200 MG: 300 CAPSULE ORAL at 21:50

## 2025-06-26 RX ADMIN — GUAIFENESIN 600 MG: 600 TABLET, EXTENDED RELEASE ORAL at 21:50

## 2025-06-26 RX ADMIN — PANTOPRAZOLE SODIUM 40 MG: 40 TABLET, DELAYED RELEASE ORAL at 05:51

## 2025-06-26 RX ADMIN — METOPROLOL SUCCINATE 25 MG: 25 TABLET, EXTENDED RELEASE ORAL at 11:37

## 2025-06-26 RX ADMIN — GUAIFENESIN 200 MG: 200 SOLUTION ORAL at 23:20

## 2025-06-26 RX ADMIN — TIZANIDINE 4 MG: 4 TABLET ORAL at 21:51

## 2025-06-26 RX ADMIN — DULOXETINE 60 MG: 30 CAPSULE, DELAYED RELEASE ORAL at 11:37

## 2025-06-26 RX ADMIN — WATER 1000 MG: 1 INJECTION INTRAMUSCULAR; INTRAVENOUS; SUBCUTANEOUS at 22:23

## 2025-06-26 RX ADMIN — IPRATROPIUM BROMIDE AND ALBUTEROL SULFATE 1 DOSE: .5; 3 SOLUTION RESPIRATORY (INHALATION) at 07:30

## 2025-06-26 RX ADMIN — ATORVASTATIN CALCIUM 40 MG: 40 TABLET, FILM COATED ORAL at 21:51

## 2025-06-26 ASSESSMENT — PAIN SCALES - GENERAL: PAINLEVEL_OUTOF10: 0

## 2025-06-26 NOTE — PROGRESS NOTES
End of Shift Note    Bedside shift change report given to FABIENNE Phoenix (oncoming nurse) by Drake Spaulding RN (offgoing nurse).  Report included the following information SBAR, Kardex, MAR, Recent Results, and Quality Measures    Shift worked:  7P-7A     Shift summary and any significant changes:     Pt AO X4. Toradol X 1 given for pain relief with good effect. Continues on 4 l of oxygen per nasal cannula. Plan of care ongoing.     Concerns for physician to address:       Zone phone for oncoming shift:          Activity:  Level of Assistance: Minimal assist, patient does 75% or more  Number times ambulated in hallways past shift: 0  Number of times OOB to chair past shift: 2    Cardiac:   Cardiac Monitoring: Yes      Cardiac Rhythm: Sinus rhythm    Access:  Current line(s): PIV     Genitourinary:        Respiratory:   O2 Device: Nasal cannula  Chronic home O2 use?: YES  Incentive spirometer at bedside: NO    GI:  Last BM (including prior to admit): 06/23/25  Current diet:  ADULT DIET; Regular; 4 carb choices (60 gm/meal); Low Fat/Low Chol/High Fiber/ARMIDA  Passing flatus: YES    Pain Management:   Patient states pain is manageable on current regimen: YES    Skin:  James Scale Score: 19  Interventions: Wound Offloading (Prevention Methods): Pillows, Repositioning, Turning    Patient Safety:  Fall Risk: Nursing Judgement-Fall Risk High(Add Comments): No  Fall Risk Interventions  Nursing Judgement-Fall Risk High(Add Comments): No  Toilet Every 2 Hours-In Advance of Need: Yes  Hourly Visual Checks: In bed, Confused  Fall Visual Posted: Socks  Room Door Open: No (Comment)  Alarm On: Other (Comment)  Patient Moved Closer to Nursing Station: No    Active Consults:   IP CONSULT TO CARDIOLOGY    Length of Stay:  Expected LOS: 5  Actual LOS: 4    Drake Spaulding RN

## 2025-06-26 NOTE — PROGRESS NOTES
Hospitalist Progress Note    NAME:   Sheila Prescott   : 1960   MRN: [id number]     Date/Time: 2025 2:30 PM  Patient PCP: Hue Montanez MD    Estimated discharge date:   Barriers: Improvement of shortness of breath, pulmonary clearance      Assessment / Plan:    Acute hypoxemic respiratory failure  Hemoptysis  Multifocal pneumonia  Sepsis secondary to above  Respiratory viral panel negative  Culture sputum Staph aureus versus normal portia  Legionella urinary antigens pending  Mycoplasma serologies pending  Continue empiric ceftriaxone azithromycin  Pulmonary following and appreciate recommendations  Will need repeat CT chest in 4 weeks to ensure resolution of pneumonia and also adenopathy  Currently on 2 L nasal cannula and wean as tolerated  Symptomatic management with Robitussin and Mucinex  Check CBC and BMP in a.m. tomorrow             Mild troponin elevation due to demand ischemia  Essential hypertension  Hyperlipidemia  Troponin peaked at 600 and currently trending down  Seen by cardiology and recommendations noted  Continue aspirin, Toprol and Lipitor  Cardiology will consider outpatient stress test  LDL is 40 and hemoglobin A1c is 5.6       Fibromyalgia  Continue PTA as needed gabapentin, duloxetine, Zanaflex nightly     MDD, ALICE  Continue PTA duloxetine     GERD  Formulary substitution Protonix        JUAN PABLO  Nocturnal BiPAP    Vaginal yeast  - Will give fluconazole      Incidental findings left nephrolithiasis  CT scan showed nonobstructing left nephrolithiasis 5mm.  Outpatient follow-up      Medical Decision Making:   I personally reviewed labs: CBC, BMP  I personally reviewed imaging: Chest x-ray  I personally reviewed EKG: Telemetry  Toxic drug monitoring:   Discussed case with: Pharmacy pharmacy        Code Status: Full code  DVT Prophylaxis: SCDs due to hemoptysis    Subjective:     Chief Complaint / Reason for Physician Visit    Shortness of breath slightly better, mild

## 2025-06-26 NOTE — PLAN OF CARE
Problem: Discharge Planning  Goal: Discharge to home or other facility with appropriate resources  6/26/2025 0043 by Drake Spaulding RN  Outcome: Progressing  6/25/2025 1046 by Luz Greene RN  Outcome: Progressing     Problem: Safety - Adult  Goal: Free from fall injury  6/26/2025 0043 by Drake Spaulding RN  Outcome: Progressing  6/25/2025 1046 by Luz Greene RN  Outcome: Progressing     Problem: Respiratory - Adult  Goal: Achieves optimal ventilation and oxygenation  6/26/2025 0043 by Drake Spaulding RN  Outcome: Progressing  6/25/2025 1046 by Luz Greene RN  Outcome: Progressing     Problem: Gastrointestinal - Adult  Goal: Minimal or absence of nausea and vomiting  6/26/2025 0043 by Drake Spaulding RN  Outcome: Progressing  6/25/2025 1046 by Luz Greene RN  Outcome: Progressing  Goal: Maintains or returns to baseline bowel function  6/26/2025 0043 by Drake Spaulding RN  Outcome: Progressing  6/25/2025 1046 by Luz Greene RN  Outcome: Progressing     Problem: Infection - Adult  Goal: Absence of infection at discharge  6/26/2025 0043 by Drake Spaulding RN  Outcome: Progressing  6/25/2025 1046 by Luz Greene RN  Outcome: Progressing  Goal: Absence of infection during hospitalization  6/26/2025 0043 by Drake Spaulding RN  Outcome: Progressing  6/25/2025 1046 by Luz Greene RN  Outcome: Progressing     Problem: Pain  Goal: Verbalizes/displays adequate comfort level or baseline comfort level  6/26/2025 0043 by Drake Spaulding RN  Outcome: Progressing  6/25/2025 1046 by Luz Greene RN  Outcome: Progressing

## 2025-06-26 NOTE — PLAN OF CARE
Problem: Discharge Planning  Goal: Discharge to home or other facility with appropriate resources  6/26/2025 0048 by Drake Spaulding RN  Outcome: Progressing  6/26/2025 0043 by Drake Spaulding RN  Outcome: Progressing     Problem: Safety - Adult  Goal: Free from fall injury  6/26/2025 0048 by Drake Spaulding RN  Outcome: Progressing  6/26/2025 0043 by Drake Spaulding RN  Outcome: Progressing     Problem: Respiratory - Adult  Goal: Achieves optimal ventilation and oxygenation  6/26/2025 0048 by Drake Spaulding RN  Outcome: Progressing  6/26/2025 0043 by Drake Spaulding RN  Outcome: Progressing     Problem: Gastrointestinal - Adult  Goal: Minimal or absence of nausea and vomiting  6/26/2025 0048 by Drake Spaulding RN  Outcome: Progressing  6/26/2025 0043 by Drake Spaulding RN  Outcome: Progressing  Goal: Maintains or returns to baseline bowel function  6/26/2025 0048 by Drake Spaulding RN  Outcome: Progressing  6/26/2025 0043 by Drake Spaulding RN  Outcome: Progressing     Problem: Infection - Adult  Goal: Absence of infection at discharge  6/26/2025 0048 by Drake Spaulding RN  Outcome: Progressing  6/26/2025 0043 by Drake Spaulding RN  Outcome: Progressing  Goal: Absence of infection during hospitalization  6/26/2025 0048 by Drake Spaulding RN  Outcome: Progressing  6/26/2025 0043 by Drake Spaulding RN  Outcome: Progressing     Problem: Pain  Goal: Verbalizes/displays adequate comfort level or baseline comfort level  6/26/2025 0048 by Drake Spaulding RN  Outcome: Progressing  6/26/2025 0043 by Drake Spaulding RN  Outcome: Progressing

## 2025-06-26 NOTE — PROGRESS NOTES
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     Management plan :     Hemoptysis and hypoxia with bilateral pulmonary infiltrates.  Clinical picture consistent with pneumonia.  Hemoptysis resolved  acute hypoxia, improving, on 1 to 2 L O2 at this time.  Titrate aiming for minimum O2 sat of 92%.  Clinically improving, elevated inflammatory markers, procalcitonin trending down.  Sputum cultures showing probable Staph aureus, further identification pending.  continue Rocephin and azithromycin.  Dense pneumonia, recommend at least 1 week of antibiotics.    Mediastinal lymphadenopathy in addition to the pulmonary infiltrates on admission chest CT, likely reactive from pneumonia. Recommend  follow up chest CT in 3 months   Continue DuoNeb nebulizations  Incentive spirometry and PEP therapy.  Continue CPAP therapy with sleep.    Medical Decision Making: High complexity, high amount of data and moderate risk management       Subjective:    Interval History:      6/26- hemoptysis subsiding. cough and O2 needs improving.  On O2 1-2 LPM.  Urine Legionella antigen negative.  Sputum probable Staph aureus.  Improving bilateral infiltrates on current chest x-ray.  6/25-cough and minimal hemoptysis improving.  Currently on O2 3 LPM.  Procalcitonin trending down.  CRP > 14, sed rate 85.  Mycoplasma workup negative.  H&H stable    HPI  Sheila Prescott is 64 y.o. lady with history of hypertension, sleep apnea, on CPAP

## 2025-06-26 NOTE — PROGRESS NOTES
Spiritual Health History and Assessment/Progress Note  [facility]    Initial Encounter,  ,  ,      Name: Sheila Prescott MRN: [id number]    Age: 64 y.o.     Sex: female   Language: English   Mandaen: Roman Catholic   Multifocal pneumonia     Date: 6/26/2025            Total Time Calculated: 8 min              Spiritual Assessment began in [facility] 3 MED TELE        Referral/Consult From: Rounding   Encounter Overview/Reason: Initial Encounter  Service Provided For: Patient    Letty, Belief, Meaning:   Patient identifies as spiritual  Family/Friends No family/friends present      Importance and Influence:  Patient has spiritual/personal beliefs that influence decisions regarding their health  Family/Friends No family/friends present    Community:  Patient is connected with a spiritual community  Family/Friends No family/friends present    Assessment and Plan of Care:     Patient Interventions include: Facilitated expression of thoughts and feelings  Family/Friends Interventions include: No family/friends present    Patient Plan of Care: Spiritual Care available upon further referral  Family/Friends Plan of Care: No family/friends present    Electronically signed by Chaplain Marin Intern on 6/26/2025 at 3:09 PM

## 2025-06-26 NOTE — PLAN OF CARE
Problem: Discharge Planning  Goal: Discharge to home or other facility with appropriate resources  Outcome: Progressing     Problem: Safety - Adult  Goal: Free from fall injury  Outcome: Progressing     Problem: Respiratory - Adult  Goal: Achieves optimal ventilation and oxygenation  6/26/2025 1655 by Alban Hinojosa RN  Outcome: Progressing  6/26/2025 0733 by Sunitha Mace RCP  Outcome: Progressing

## 2025-06-27 LAB
ANION GAP SERPL CALC-SCNC: 5 MMOL/L (ref 2–12)
BACTERIA SPEC CULT: NORMAL
BACTERIA SPEC CULT: NORMAL
BUN SERPL-MCNC: 12 MG/DL (ref 6–20)
BUN/CREAT SERPL: 18 (ref 12–20)
CALCIUM SERPL-MCNC: 9.4 MG/DL (ref 8.5–10.1)
CHLORIDE SERPL-SCNC: 109 MMOL/L (ref 97–108)
CO2 SERPL-SCNC: 27 MMOL/L (ref 21–32)
CREAT SERPL-MCNC: 0.66 MG/DL (ref 0.55–1.02)
ERYTHROCYTE [DISTWIDTH] IN BLOOD BY AUTOMATED COUNT: 13.5 % (ref 11.5–14.5)
GLUCOSE SERPL-MCNC: 106 MG/DL (ref 65–100)
HCT VFR BLD AUTO: 33.2 % (ref 35–47)
HGB BLD-MCNC: 10.2 G/DL (ref 11.5–16)
MCH RBC QN AUTO: 29.1 PG (ref 26–34)
MCHC RBC AUTO-ENTMCNC: 30.7 G/DL (ref 30–36.5)
MCV RBC AUTO: 94.6 FL (ref 80–99)
NRBC # BLD: 0 K/UL (ref 0–0.01)
NRBC BLD-RTO: 0 PER 100 WBC
PLATELET # BLD AUTO: 265 K/UL (ref 150–400)
PMV BLD AUTO: 9.8 FL (ref 8.9–12.9)
POTASSIUM SERPL-SCNC: 4.1 MMOL/L (ref 3.5–5.1)
RBC # BLD AUTO: 3.51 M/UL (ref 3.8–5.2)
SERVICE CMNT-IMP: NORMAL
SODIUM SERPL-SCNC: 141 MMOL/L (ref 136–145)
WBC # BLD AUTO: 6.9 K/UL (ref 3.6–11)

## 2025-06-27 PROCEDURE — 6360000002 HC RX W HCPCS: Performed by: STUDENT IN AN ORGANIZED HEALTH CARE EDUCATION/TRAINING PROGRAM

## 2025-06-27 PROCEDURE — 2500000003 HC RX 250 WO HCPCS: Performed by: STUDENT IN AN ORGANIZED HEALTH CARE EDUCATION/TRAINING PROGRAM

## 2025-06-27 PROCEDURE — 85027 COMPLETE CBC AUTOMATED: CPT

## 2025-06-27 PROCEDURE — 6370000000 HC RX 637 (ALT 250 FOR IP): Performed by: INTERNAL MEDICINE

## 2025-06-27 PROCEDURE — 6370000000 HC RX 637 (ALT 250 FOR IP): Performed by: STUDENT IN AN ORGANIZED HEALTH CARE EDUCATION/TRAINING PROGRAM

## 2025-06-27 PROCEDURE — 5A09357 ASSISTANCE WITH RESPIRATORY VENTILATION, LESS THAN 24 CONSECUTIVE HOURS, CONTINUOUS POSITIVE AIRWAY PRESSURE: ICD-10-PCS | Performed by: STUDENT IN AN ORGANIZED HEALTH CARE EDUCATION/TRAINING PROGRAM

## 2025-06-27 PROCEDURE — 36415 COLL VENOUS BLD VENIPUNCTURE: CPT

## 2025-06-27 PROCEDURE — 2500000003 HC RX 250 WO HCPCS: Performed by: INTERNAL MEDICINE

## 2025-06-27 PROCEDURE — 94761 N-INVAS EAR/PLS OXIMETRY MLT: CPT

## 2025-06-27 PROCEDURE — 2700000000 HC OXYGEN THERAPY PER DAY

## 2025-06-27 PROCEDURE — 1100000003 HC PRIVATE W/ TELEMETRY

## 2025-06-27 PROCEDURE — 6360000002 HC RX W HCPCS: Performed by: INTERNAL MEDICINE

## 2025-06-27 PROCEDURE — 80048 BASIC METABOLIC PNL TOTAL CA: CPT

## 2025-06-27 RX ADMIN — KETOROLAC TROMETHAMINE 15 MG: 30 INJECTION, SOLUTION INTRAMUSCULAR at 14:21

## 2025-06-27 RX ADMIN — SODIUM CHLORIDE, PRESERVATIVE FREE 10 ML: 5 INJECTION INTRAVENOUS at 20:33

## 2025-06-27 RX ADMIN — WATER 1000 MG: 1 INJECTION INTRAMUSCULAR; INTRAVENOUS; SUBCUTANEOUS at 20:31

## 2025-06-27 RX ADMIN — GUAIFENESIN 200 MG: 200 SOLUTION ORAL at 14:21

## 2025-06-27 RX ADMIN — CLOPIDOGREL BISULFATE 75 MG: 75 TABLET, FILM COATED ORAL at 09:31

## 2025-06-27 RX ADMIN — TIZANIDINE 4 MG: 4 TABLET ORAL at 20:32

## 2025-06-27 RX ADMIN — GUAIFENESIN 600 MG: 600 TABLET, EXTENDED RELEASE ORAL at 09:32

## 2025-06-27 RX ADMIN — METOPROLOL SUCCINATE 25 MG: 25 TABLET, EXTENDED RELEASE ORAL at 09:32

## 2025-06-27 RX ADMIN — PANTOPRAZOLE SODIUM 40 MG: 40 TABLET, DELAYED RELEASE ORAL at 06:05

## 2025-06-27 RX ADMIN — GABAPENTIN 1200 MG: 300 CAPSULE ORAL at 20:32

## 2025-06-27 RX ADMIN — ASPIRIN 81 MG: 81 TABLET, CHEWABLE ORAL at 09:32

## 2025-06-27 RX ADMIN — GABAPENTIN 900 MG: 300 CAPSULE ORAL at 09:31

## 2025-06-27 RX ADMIN — AZITHROMYCIN 500 MG: 250 TABLET, FILM COATED ORAL at 09:31

## 2025-06-27 RX ADMIN — DULOXETINE 60 MG: 30 CAPSULE, DELAYED RELEASE ORAL at 09:31

## 2025-06-27 RX ADMIN — GUAIFENESIN 200 MG: 200 SOLUTION ORAL at 09:32

## 2025-06-27 RX ADMIN — ATORVASTATIN CALCIUM 40 MG: 40 TABLET, FILM COATED ORAL at 20:32

## 2025-06-27 RX ADMIN — GUAIFENESIN 600 MG: 600 TABLET, EXTENDED RELEASE ORAL at 20:32

## 2025-06-27 RX ADMIN — SODIUM CHLORIDE, PRESERVATIVE FREE 10 ML: 5 INJECTION INTRAVENOUS at 09:32

## 2025-06-27 RX ADMIN — MELATONIN 3 MG: at 20:32

## 2025-06-27 ASSESSMENT — PAIN - FUNCTIONAL ASSESSMENT: PAIN_FUNCTIONAL_ASSESSMENT: PREVENTS OR INTERFERES SOME ACTIVE ACTIVITIES AND ADLS

## 2025-06-27 ASSESSMENT — PAIN SCALES - GENERAL
PAINLEVEL_OUTOF10: 5
PAINLEVEL_OUTOF10: 0
PAINLEVEL_OUTOF10: 3

## 2025-06-27 ASSESSMENT — PAIN DESCRIPTION - LOCATION: LOCATION: CHEST

## 2025-06-27 ASSESSMENT — PAIN DESCRIPTION - DESCRIPTORS: DESCRIPTORS: ACHING;DISCOMFORT

## 2025-06-27 NOTE — PROGRESS NOTES
Progress Note      6/27/2025 7:07 AM  NAME: Sheila Prescott   MRN:  [id number]   Admit Diagnosis: Acute coronary syndrome (HCC) [I24.9]  Multifocal pneumonia [J18.9]     Primary Cardiologist:  ROSEANN Velazco      Assessment:     HTN  Arthritis - RA  Fibromyalgia   Anxiety   GERD  Pedal edema, minimal   TIA: Chronic DAPT     Admitted w/ fever; hypoxia, pulm infiltrates/productive cough & hemoptysis for last 2 days.    Type 2 NSTEMI  Hypoxic respiratory failure   Multifocal PNA, Hemoptysis   Sepsis       Na 127; K 2.3; Trop upto 600s; LBBB noted  cardiac meds 6/23: asa/plavix; lipitor 40; clonidine 0.1 qhs;   IMP: type II NQWMI; cont supportive care        CTA  Bilateral airspace infiltrates compatible with pneumonia with no  pulmonary embolus and probable reactive bilateral hilar and mediastinal  adenopathy for which follow-up is recommended. Hepatic steatosis, nonobstructing  left nephrolithiasis, and additional incidentals as above.    Trop 639 > 383        Recommendations:     Cont aspirin   Cont lipitor   Cont metoprolol   Cont to hold plavix   Abx per primary team, pulmonary following   Echo unremarkable   Will consider OP stress test to eval for underlying CAD    Fu in 1-2 months   Call if needed         [x]        High complexity decision making was performed    Subjective:     HPI:   No CP  Dyspnea improving   On 1 L O2        ROS: No CP, SOB, Abd pain, nausea, vomiting, syncope, palpitations, new focal neurological symptoms     Objective:      Physical Exam:    Last 24hrs VS reviewed since prior progress note. Most recent are:    /68   Pulse 61   Temp 97.3 °F (36.3 °C) (Oral)   Resp 19   Ht 1.727 m (5' 8\")   Wt (!) 172.2 kg (379 lb 10.1 oz)   SpO2 95%   BMI 57.72 kg/m²     Intake/Output Summary (Last 24 hours) at 6/27/2025 0707  Last data filed at 6/26/2025 2224  Gross per 24 hour   Intake 710 ml   Output --   Net 710 ml          General: Alert and oriented x3, no acute distress   Neck: Supple    Respiratory: No respiratory distress, crackles   Cardiovascular: Regular rate rhythm, S1S2, no murmur   Abdomen: soft, non tender, non distended   Neuro: moves all extremities, oriented x3   Skin: warm and dry   Extremity: no edema, warm to touch       Data Review    Telemetry: normal sinus rhythm     Lab Data Personally Reviewed:    Recent Labs     06/26/25  0547 06/27/25  0612   WBC 6.7 6.9   HGB 9.4* 10.2*   HCT 31.2* 33.2*    265     No results for input(s): \"INR\", \"APTT\" in the last 72 hours.    Invalid input(s): \"PTP\"   Recent Labs     06/25/25  0607 06/26/25  0547 06/27/25  0612    140 141   K 3.9 3.8 4.1    108 109*   CO2 26 28 27   BUN 15 13 12     No results for input(s): \"CPK\" in the last 72 hours.    Invalid input(s): \"CPKMB\", \"CKNDX\", \"TROIQ\"  Lab Results   Component Value Date/Time    CHOL 86 06/22/2025 09:54 PM    HDL 34 06/22/2025 09:54 PM    LDL 40 06/22/2025 09:54 PM     12/10/2021 02:45 PM       No results for input(s): \"TP\", \"GLOB\", \"GGT\" in the last 72 hours.    Invalid input(s): \"SGOT\", \"GPT\", \"AP\", \"TBIL\", \"ALB\", \"AML\", \"AMYP\", \"LPSE\", \"HLPSE\"    No results for input(s): \"PH\", \"PCO2\", \"PO2\" in the last 72 hours.    Medications Personally Reviewed:    Current Facility-Administered Medications   Medication Dose Route Frequency    guaiFENesin (ROBITUSSIN) 100 MG/5ML liquid 200 mg  200 mg Oral Q4H PRN    ipratropium 0.5 mg-albuterol 2.5 mg (DUONEB) nebulizer solution 1 Dose  1 Dose Inhalation Q4H PRN    azithromycin (ZITHROMAX) tablet 500 mg  500 mg Oral Daily    metoprolol succinate (TOPROL XL) extended release tablet 25 mg  25 mg Oral Daily    aspirin chewable tablet 81 mg  81 mg Oral Daily    atorvastatin (LIPITOR) tablet 40 mg  40 mg Oral Nightly    cefTRIAXone (ROCEPHIN) 1,000 mg in sterile water 10 mL IV syringe  1,000 mg IntraVENous Q24H    clopidogrel (PLAVIX) tablet 75 mg  75 mg Oral Daily    DULoxetine (CYMBALTA) extended release capsule 60 mg  60 mg Oral  Daily    pantoprazole (PROTONIX) tablet 40 mg  40 mg Oral QAM AC    tiZANidine (ZANAFLEX) tablet 4 mg  4 mg Oral QHS    ketorolac (TORADOL) injection 15 mg  15 mg IntraVENous Q6H PRN    sodium chloride flush 0.9 % injection 5-40 mL  5-40 mL IntraVENous 2 times per day    sodium chloride flush 0.9 % injection 5-40 mL  5-40 mL IntraVENous PRN    0.9 % sodium chloride infusion   IntraVENous PRN    potassium chloride (KLOR-CON M) extended release tablet 40 mEq  40 mEq Oral PRN    Or    potassium bicarb-citric acid (EFFER-K) effervescent tablet 40 mEq  40 mEq Oral PRN    Or    potassium chloride 10 mEq/100 mL IVPB (Peripheral Line)  10 mEq IntraVENous PRN    magnesium sulfate 2000 mg in 50 mL IVPB premix  2,000 mg IntraVENous PRN    ondansetron (ZOFRAN-ODT) disintegrating tablet 4 mg  4 mg Oral Q8H PRN    Or    ondansetron (ZOFRAN) injection 4 mg  4 mg IntraVENous Q6H PRN    polyethylene glycol (GLYCOLAX) packet 17 g  17 g Oral Daily PRN    acetaminophen (TYLENOL) tablet 650 mg  650 mg Oral Q6H PRN    Or    acetaminophen (TYLENOL) suppository 650 mg  650 mg Rectal Q6H PRN    guaiFENesin (MUCINEX) extended release tablet 600 mg  600 mg Oral BID    melatonin tablet 3 mg  3 mg Oral Nightly PRN    gabapentin (NEURONTIN) capsule 900 mg  900 mg Oral QAM    gabapentin (NEURONTIN) capsule 1,200 mg  1,200 mg Oral Nightly              Keli Velazco MD

## 2025-06-27 NOTE — PROGRESS NOTES
End of Shift Note    Bedside shift change report given to ALYSHA Cuba (oncoming nurse) by Alban Hinojosa RN (offgoing nurse).  Report included the following information SBAR, Kardex, ED Summary, OR Summary, Procedure Summary, Intake/Output, MAR, Recent Results, Med Rec Status, Cardiac Rhythm Sinus rhythm, Alarm Parameters , and Quality Measures    Shift worked:  7a-7p     Shift summary and any significant changes:     Patient voiced no complaints of pain. Tolerated diet. Remains with 2L 02. Patient remained stable at the time of shift change.      Concerns for physician to address:  ___     Zone phone for oncoming shift:   [phone]       Activity:  Level of Assistance: Minimal assist, patient does 75% or more  Number times ambulated in hallways past shift: 0  Number of times OOB to chair past shift: 2    Cardiac:   Cardiac Monitoring: Yes      Cardiac Rhythm: Sinus rhythm    Access:  Current line(s): PIV     Genitourinary:        Respiratory:   O2 Device: Nasal cannula  Chronic home O2 use?: NO  Incentive spirometer at bedside: YES    GI:  Last BM (including prior to admit): 06/23/25  Current diet:  ADULT DIET; Regular; 4 carb choices (60 gm/meal); Low Fat/Low Chol/High Fiber/ARMIDA  Passing flatus: YES    Pain Management:   Patient states pain is manageable on current regimen: YES    Skin:  James Scale Score: 19  Interventions: Wound Offloading (Prevention Methods): Repositioning, Pillows    Patient Safety:  Fall Risk: Nursing Judgement-Fall Risk High(Add Comments): No  Fall Risk Interventions  Nursing Judgement-Fall Risk High(Add Comments): No  Toilet Every 2 Hours-In Advance of Need: Yes  Hourly Visual Checks: In bed, Confused  Fall Visual Posted: Socks  Room Door Open: No (Comment)  Alarm On: Other (Comment)  Patient Moved Closer to Nursing Station: No    Active Consults:   IP CONSULT TO CARDIOLOGY    Length of Stay:  Expected LOS: 5  Actual LOS: 4    Alban Hinojosa RN

## 2025-06-27 NOTE — PROGRESS NOTES
Hospitalist Progress Note    NAME:   Sheila Prescott   : 1960   MRN: [id number]     Date/Time: 2025 3:09 PM  Patient PCP: Hue Montanez MD    Estimated discharge date:   Barriers: Improvement of shortness of breath, pulmonary clearance      Assessment / Plan:    Acute hypoxemic respiratory failure  Hemoptysis  Multifocal pneumonia  Sepsis secondary to above  Respiratory viral panel negative  Sputum cultures grew MSSA  Continue empiric ceftriaxone azithromycin  Pulmonary following and appreciate recommendations  Will need repeat CT chest in 4 weeks to ensure resolution of pneumonia and also adenopathy  Currently on 1 L nasal cannula and wean as tolerated  Symptomatic management with Robitussin and Mucinex  Check CBC and BMP in a.m. tomorrow  Per pulmonary, she will be ready for discharge tomorrow and she is not ready for discharge today due to shortness of breath with ambulation           Mild troponin elevation due to demand ischemia  Essential hypertension  Hyperlipidemia  Troponin peaked at 600 and currently trending down  Seen by cardiology and recommendations noted  Continue aspirin, Toprol and Lipitor  Cardiology will consider outpatient stress test  LDL is 40 and hemoglobin A1c is 5.6       Fibromyalgia  Continue PTA as needed gabapentin, duloxetine, Zanaflex nightly     MDD, ALICE  Continue PTA duloxetine     GERD  Formulary substitution Protonix        JUAN PABLO  Nocturnal BiPAP    Vaginal yeast  - Will give fluconazole      Incidental findings left nephrolithiasis  CT scan showed nonobstructing left nephrolithiasis 5mm.  Outpatient follow-up      Medical Decision Making:   I personally reviewed labs: CBC, BMP  I personally reviewed imaging: Chest x-ray  I personally reviewed EKG: Telemetry  Toxic drug monitoring:   Discussed case with: Pharmacy pharmacy        Code Status: Full code  DVT Prophylaxis: SCDs due to hemoptysis    Subjective:     Chief Complaint / Reason for Physician

## 2025-06-27 NOTE — PROGRESS NOTES
End of Shift Note    Bedside shift change report given to Alban LOVING (oncoming nurse) by Rosa Elena Donohue LPN (offgoing nurse).  Report included the following information SBAR and MAR    Shift worked:  NIGHT     Shift summary and any significant changes:     Pt was calm throughout the night. Pt received PRN robitussin 1x. Meds given per MAR.      Concerns for physician to address:       Zone phone for oncoming shift:          Activity:  Level of Assistance: Minimal assist, patient does 75% or more  Number times ambulated in hallways past shift: 0  Number of times OOB to chair past shift: 1    Cardiac:   Cardiac Monitoring: Yes      Cardiac Rhythm: Sinus rhythm    Access:  Current line(s): PIV    Genitourinary:        Respiratory:   O2 Device: PAP (positive airway pressure)  Chronic home O2 use?: N/A  Incentive spirometer at bedside: NO    GI:  Last BM (including prior to admit): 06/23/25  Current diet:  ADULT DIET; Regular; 4 carb choices (60 gm/meal); Low Fat/Low Chol/High Fiber/ARMIDA  Passing flatus: YES    Pain Management:   Patient states pain is manageable on current regimen: YES    Skin:  James Scale Score: 20  Interventions: Wound Offloading (Prevention Methods): Repositioning, Pillows    Patient Safety:  Fall Risk: Nursing Judgement-Fall Risk High(Add Comments): No  Fall Risk Interventions  Nursing Judgement-Fall Risk High(Add Comments): No  Toilet Every 2 Hours-In Advance of Need: Yes  Hourly Visual Checks: In bed, Awake  Fall Visual Posted: Socks  Room Door Open: Deferred to promote rest  Alarm On: Bed  Patient Moved Closer to Nursing Station: No    Active Consults:   IP CONSULT TO CARDIOLOGY    Length of Stay:  Expected LOS: 5  Actual LOS: 5    Rosa Elena Donohue LPN

## 2025-06-27 NOTE — PROGRESS NOTES
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     Management plan :     Hemoptysis and hypoxia with bilateral pulmonary infiltrates.  Clinical picture consistent with pneumonia.  Hemoptysis resolved  acute hypoxia, improving, on 1 to 2 L O2 at this time.  Titrate aiming for minimum O2 sat of 92%.    Sputum cultures like Staph aureus, susceptibilities pending.  MRSA nares screen pending.  Clinically improving, white count normalized and procalcitonin improving, improvement in the pulmonary infiltrates on chest x-ray as well.  Continue current antibiotics.    Dense pneumonia, recommend at least 1 week of antibiotics, repeat chest film in the morning.  Mediastinal lymphadenopathy in addition to the pulmonary infiltrates on admission chest CT, likely reactive from pneumonia. Recommend  follow up chest CT in 3 months   Continue DuoNeb nebulizations  Incentive spirometry and PEP therapy.  Continue CPAP therapy with sleep.    Will need [facility] follow[facility]up at [facility].    Total time of encounter > 35 minutes       Subjective:    Interval History:    6/27-on minimal supplemental oxygen, sputum grew light Staph aureus, blood cultures remain negative.  6/26- hemoptysis subsiding. cough and O2 needs improving.  On O2 1-2 LPM.  Urine Legionella antigen negative.  Sputum probable Staph aureus.  Improving bilateral infiltrates on current chest x-ray.  6/25-cough and minimal hemoptysis improving.  Currently on O2 3 LPM.

## 2025-06-27 NOTE — PROGRESS NOTES
Attempted to schedule PCP hospital follow up. Patient's PCP information is based out of [place]. The PCP's [place], [place] office is permanently closed. Pending patient discharge.

## 2025-06-28 ENCOUNTER — APPOINTMENT (OUTPATIENT)
Facility: HOSPITAL | Age: 65
End: 2025-06-28
Payer: COMMERCIAL

## 2025-06-28 VITALS
SYSTOLIC BLOOD PRESSURE: 116 MMHG | HEIGHT: 68 IN | OXYGEN SATURATION: 96 % | RESPIRATION RATE: 18 BRPM | HEART RATE: 70 BPM | BODY MASS INDEX: 39.36 KG/M2 | WEIGHT: 259.7 LBS | DIASTOLIC BLOOD PRESSURE: 85 MMHG | TEMPERATURE: 97.5 F

## 2025-06-28 LAB
ANION GAP SERPL CALC-SCNC: 5 MMOL/L (ref 2–12)
BUN SERPL-MCNC: 12 MG/DL (ref 6–20)
BUN/CREAT SERPL: 16 (ref 12–20)
CALCIUM SERPL-MCNC: 9.1 MG/DL (ref 8.5–10.1)
CHLORIDE SERPL-SCNC: 111 MMOL/L (ref 97–108)
CO2 SERPL-SCNC: 25 MMOL/L (ref 21–32)
CREAT SERPL-MCNC: 0.73 MG/DL (ref 0.55–1.02)
ERYTHROCYTE [DISTWIDTH] IN BLOOD BY AUTOMATED COUNT: 13.4 % (ref 11.5–14.5)
GLUCOSE SERPL-MCNC: 126 MG/DL (ref 65–100)
HCT VFR BLD AUTO: 35 % (ref 35–47)
HGB BLD-MCNC: 10.7 G/DL (ref 11.5–16)
MCH RBC QN AUTO: 29 PG (ref 26–34)
MCHC RBC AUTO-ENTMCNC: 30.6 G/DL (ref 30–36.5)
MCV RBC AUTO: 94.9 FL (ref 80–99)
NRBC # BLD: 0 K/UL (ref 0–0.01)
NRBC BLD-RTO: 0 PER 100 WBC
PLATELET # BLD AUTO: 267 K/UL (ref 150–400)
PMV BLD AUTO: 9.8 FL (ref 8.9–12.9)
POTASSIUM SERPL-SCNC: 4.3 MMOL/L (ref 3.5–5.1)
RBC # BLD AUTO: 3.69 M/UL (ref 3.8–5.2)
SODIUM SERPL-SCNC: 141 MMOL/L (ref 136–145)
WBC # BLD AUTO: 5.4 K/UL (ref 3.6–11)

## 2025-06-28 PROCEDURE — 36415 COLL VENOUS BLD VENIPUNCTURE: CPT

## 2025-06-28 PROCEDURE — 6370000000 HC RX 637 (ALT 250 FOR IP): Performed by: HOSPITALIST

## 2025-06-28 PROCEDURE — 85027 COMPLETE CBC AUTOMATED: CPT

## 2025-06-28 PROCEDURE — 80048 BASIC METABOLIC PNL TOTAL CA: CPT

## 2025-06-28 PROCEDURE — 71045 X-RAY EXAM CHEST 1 VIEW: CPT

## 2025-06-28 PROCEDURE — 6370000000 HC RX 637 (ALT 250 FOR IP): Performed by: INTERNAL MEDICINE

## 2025-06-28 PROCEDURE — 2500000003 HC RX 250 WO HCPCS: Performed by: INTERNAL MEDICINE

## 2025-06-28 PROCEDURE — 6370000000 HC RX 637 (ALT 250 FOR IP): Performed by: STUDENT IN AN ORGANIZED HEALTH CARE EDUCATION/TRAINING PROGRAM

## 2025-06-28 PROCEDURE — 2500000003 HC RX 250 WO HCPCS: Performed by: STUDENT IN AN ORGANIZED HEALTH CARE EDUCATION/TRAINING PROGRAM

## 2025-06-28 RX ORDER — FLUCONAZOLE 100 MG/1
200 TABLET ORAL ONCE
Status: COMPLETED | OUTPATIENT
Start: 2025-06-28 | End: 2025-06-28

## 2025-06-28 RX ORDER — GUAIFENESIN 200 MG/10ML
200 LIQUID ORAL EVERY 4 HOURS PRN
Qty: 118 ML | Refills: 0 | Status: SHIPPED | OUTPATIENT
Start: 2025-06-28

## 2025-06-28 RX ORDER — GUAIFENESIN 600 MG/1
600 TABLET, EXTENDED RELEASE ORAL 2 TIMES DAILY
Qty: 14 TABLET | Refills: 0 | Status: SHIPPED | OUTPATIENT
Start: 2025-06-28 | End: 2025-07-05

## 2025-06-28 RX ADMIN — ASPIRIN 81 MG: 81 TABLET, CHEWABLE ORAL at 08:40

## 2025-06-28 RX ADMIN — GUAIFENESIN 600 MG: 600 TABLET, EXTENDED RELEASE ORAL at 08:40

## 2025-06-28 RX ADMIN — SODIUM CHLORIDE, PRESERVATIVE FREE 10 ML: 5 INJECTION INTRAVENOUS at 08:44

## 2025-06-28 RX ADMIN — DULOXETINE 60 MG: 30 CAPSULE, DELAYED RELEASE ORAL at 08:40

## 2025-06-28 RX ADMIN — GABAPENTIN 900 MG: 300 CAPSULE ORAL at 08:40

## 2025-06-28 RX ADMIN — GUAIFENESIN 200 MG: 200 SOLUTION ORAL at 08:50

## 2025-06-28 RX ADMIN — PANTOPRAZOLE SODIUM 40 MG: 40 TABLET, DELAYED RELEASE ORAL at 06:18

## 2025-06-28 RX ADMIN — FLUCONAZOLE 200 MG: 100 TABLET ORAL at 10:52

## 2025-06-28 RX ADMIN — AZITHROMYCIN 500 MG: 250 TABLET, FILM COATED ORAL at 08:40

## 2025-06-28 RX ADMIN — CLOPIDOGREL BISULFATE 75 MG: 75 TABLET, FILM COATED ORAL at 08:41

## 2025-06-28 RX ADMIN — METOPROLOL SUCCINATE 25 MG: 25 TABLET, EXTENDED RELEASE ORAL at 08:40

## 2025-06-28 NOTE — CARE COORDINATION
06/28/25 1108   Services At/After Discharge   Transition of Care Consult (CM Consult) N/A   Services At/After Discharge None   Mode of Transport at Discharge Other (see comment)   Confirm Follow Up Transport Family   Condition of Participation: Discharge Planning   The Plan for Transition of Care is related to the following treatment goals: PCP and specialist     Discharge order is in. CM called patient and discussed DC. She has no needs from CM and her sister is going to transport her home.  English Ulices LOVING CM [phone]

## 2025-06-28 NOTE — PLAN OF CARE
Problem: Discharge Planning  Goal: Discharge to home or other facility with appropriate resources  6/28/2025 0803 by Nita Diallo, RN  Outcome: Progressing  6/28/2025 0428 by Sade Araya, RN  Outcome: Progressing

## 2025-06-28 NOTE — PROGRESS NOTES
End of Shift Note    Bedside shift change report given to Malgorzata (oncoming nurse) by Sade Araya RN (offgoing nurse).  Report included the following information SBAR, Kardex, Intake/Output, MAR, and Recent Results    Shift worked:  0431-9130     Shift summary and any significant changes:     No acute concerns. No events overnight. Pt hopeful for D/C.     Concerns for physician to address:  none     Zone phone for oncoming shift:          Activity:  Level of Assistance: Minimal assist, patient does 75% or more  Number times ambulated in hallways past shift: 0  Number of times OOB to chair past shift: 1    Cardiac:   Cardiac Monitoring: Yes      Cardiac Rhythm: Sinus rhythm    Access:  Current line(s): PIV     Genitourinary:        Respiratory:   O2 Device: None (Room air)  Chronic home O2 use?: NO  Incentive spirometer at bedside: YES    GI:  Last BM (including prior to admit): 06/23/25  Current diet:  ADULT DIET; Regular; 4 carb choices (60 gm/meal); Low Fat/Low Chol/High Fiber/ARMIDA  Passing flatus: YES    Pain Management:   Patient states pain is manageable on current regimen: N/A    Skin:  James Scale Score: 20  Interventions: Wound Offloading (Prevention Methods): Bed, pressure reduction mattress, Elevate heels, Pillows, Repositioning, Turning    Patient Safety:  Fall Risk: Nursing Judgement-Fall Risk High(Add Comments): No  Fall Risk Interventions  Nursing Judgement-Fall Risk High(Add Comments): No  Toilet Every 2 Hours-In Advance of Need: Yes  Hourly Visual Checks: Awake, In chair, Quiet  Fall Visual Posted: Fall sign posted, Socks  Room Door Open: Deferred to promote rest  Alarm On: Bed  Patient Moved Closer to Nursing Station: No    Active Consults:   IP CONSULT TO CARDIOLOGY    Length of Stay:  Expected LOS: 6  Actual LOS: 6    Sade Araya, RN

## 2025-06-28 NOTE — PROGRESS NOTES
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     Management plan :     Hemoptysis and hypoxia with bilateral pulmonary infiltrates.  Clinical picture consistent with pneumonia.  Hemoptysis resolved  acute hypoxia, improving, off of supplemental oxygen now.    Sputum cultures grew MSSA.  MRSA nares screen negative.  Clinically improving, white count normalized and procalcitonin improving.  Marked improvement in pulmonary infiltrates on chest x-ray today.  Completed 1 week of antibiotics.   Dense bilateral pneumonia, much improved on chest x-ray.  Will need follow-up imaging to ensure resolution.  Mediastinal lymphadenopathy in addition to the pulmonary infiltrates on admission chest CT, likely reactive from pneumonia. Recommend  follow up chest CT in 3 months   Incentive spirometry and PEP therapy.  Continue CPAP therapy with sleep.    Case discussed with hospitalist.    Thank you for involving us in her care.  Will offer office follow-up at [facility].    Total time of encounter > 35 minutes       Subjective:    Interval History:    6/28-breathing continues to improve.  On room air.  Legionella antigen negative.  MRSA nasal screen negative.  Sputum Staph aureus is methicillin sensetive.    6/27-on minimal supplemental oxygen, sputum grew light Staph aureus, blood cultures remain negative.  6/26- hemoptysis subsiding. cough and O2 needs improving.  On O2 1-2 LPM.  Urine Legionella antigen negative.  Sputum  probable Staph aureus.  Improving bilateral infiltrates on current chest x-ray.  6/25-cough and minimal hemoptysis improving.  Currently on O2 3 LPM.  Procalcitonin trending down.  CRP > 14, sed rate 85.  Mycoplasma workup negative.  H&H stable    HPI  Sheila Prescott is 64 y.o. lady with history of hypertension, sleep apnea, on CPAP therapy, history of pneumonia about 10 years ago and COVID few years ago, nephrolithiasis admitted earlier this week with fever, cough and bodyaches since last week.  Patient was febrile and hypoxic at admission.  She required up to 6 L oxygen, she is currently on 4 L.  Patient reports bloody sputum since last Saturday.  Initially it was thick and mucoid, now it is much thinner.   denies blood in urine,  no prior history of connective tissue disease. Patient is on Rocephin, azithromycin and Mucinex from respiratory standpoint.       Medications:  Current Facility-Administered Medications   Medication Dose Route Frequency Provider Last Rate Last Admin    guaiFENesin (ROBITUSSIN) 100 MG/5ML liquid 200 mg  200 mg Oral Q4H PRN Junito Rodriguez MD   200 mg at 06/28/25 0850    ipratropium 0.5 mg-albuterol 2.5 mg (DUONEB) nebulizer solution 1 Dose  1 Dose Inhalation Q4H PRN Junito Rodriguez MD        metoprolol succinate (TOPROL XL) extended release tablet 25 mg  25 mg Oral Daily Adan Jackson MD   25 mg at 06/28/25 0840    aspirin chewable tablet 81 mg  81 mg Oral Daily Hugo Shaffer DO   81 mg at 06/28/25 0840    atorvastatin (LIPITOR) tablet 40 mg  40 mg Oral Nightly Hugo Shaffer DO   40 mg at 06/27/25 2032    cefTRIAXone (ROCEPHIN) 1,000 mg in sterile water 10 mL IV syringe  1,000 mg IntraVENous Q24H Junito Rodriguez MD   1,000 mg at 06/27/25 2031    clopidogrel (PLAVIX) tablet 75 mg  75 mg Oral Daily Junito Rodriguez MD   75 mg at 06/28/25 0841    DULoxetine (CYMBALTA) extended release capsule 60 mg  60 mg Oral Daily Hugo Shaffer DO   60 mg at 06/28/25 0840     pantoprazole (PROTONIX) tablet 40 mg  40 mg Oral QAM AC Hugo Shaffer, DO   40 mg at 06/28/25 0618    tiZANidine (ZANAFLEX) tablet 4 mg  4 mg Oral QHS Hugo Shaffer, DO   4 mg at 06/27/25 2032    sodium chloride flush 0.9 % injection 5-40 mL  5-40 mL IntraVENous 2 times per day Hugo Shaffer, DO   10 mL at 06/28/25 0844    sodium chloride flush 0.9 % injection 5-40 mL  5-40 mL IntraVENous PRN Hugo Shaffer,         0.9 % sodium chloride infusion   IntraVENous PRN Hugo Shaffer, DO        potassium chloride (KLOR-CON M) extended release tablet 40 mEq  40 mEq Oral PRN Hugo Shaffer DO        Or    potassium bicarb-citric acid (EFFER-K) effervescent tablet 40 mEq  40 mEq Oral PRN Hugo Shaffer,         Or    potassium chloride 10 mEq/100 mL IVPB (Peripheral Line)  10 mEq IntraVENous PRN Hugo Shaffer,  mL/hr at 06/23/25 0236 10 mEq at 06/23/25 0236    magnesium sulfate 2000 mg in 50 mL IVPB premix  2,000 mg IntraVENous PRN Hugo Shaffer,         ondansetron (ZOFRAN-ODT) disintegrating tablet 4 mg  4 mg Oral Q8H PRN Hugo Shaffer,         Or    ondansetron (ZOFRAN) injection 4 mg  4 mg IntraVENous Q6H PRN Hugo Shaffer, DO        polyethylene glycol (GLYCOLAX) packet 17 g  17 g Oral Daily PRN Hugo Shaffer, DO   17 g at 06/23/25 1958    acetaminophen (TYLENOL) tablet 650 mg  650 mg Oral Q6H PRN Hugo Shaffer, DO   650 mg at 06/25/25 0856    Or    acetaminophen (TYLENOL) suppository 650 mg  650 mg Rectal Q6H PRN Hugo Shaffer,         guaiFENesin (MUCINEX) extended release tablet 600 mg  600 mg Oral BID Hugo Shaffer, DO   600 mg at 06/28/25 0840    melatonin tablet 3 mg  3 mg Oral Nightly PRN Hugo Shaffer, DO   3 mg at 06/27/25 2032    gabapentin (NEURONTIN) capsule 900 mg  900 mg Oral Hugo Mercado,    900 mg at 06/28/25 0840    gabapentin (NEURONTIN) capsule

## 2025-06-28 NOTE — PLAN OF CARE
Problem: Discharge Planning  Goal: Discharge to home or other facility with appropriate resources  Outcome: Progressing     Problem: Safety - Adult  Goal: Free from fall injury  Outcome: Progressing     Problem: Respiratory - Adult  Goal: Achieves optimal ventilation and oxygenation  Outcome: Progressing     Problem: Gastrointestinal - Adult  Goal: Minimal or absence of nausea and vomiting  Outcome: Progressing  Goal: Maintains or returns to baseline bowel function  Outcome: Progressing     Problem: Infection - Adult  Goal: Absence of infection at discharge  Outcome: Progressing  Goal: Absence of infection during hospitalization  Outcome: Progressing     Problem: Pain  Goal: Verbalizes/displays adequate comfort level or baseline comfort level  Outcome: Progressing

## 2025-06-28 NOTE — PROGRESS NOTES
Patient with orders for discharge today. Patient discharge to home. Discharge instructions explained and patient verbalizes understanding. Received now dose of Flucindole prior to discharge. Sister in at bedside and assisting patient to get dressed. Understands all f/u appointments to doctor.. IV's pulled and Heart monitor pulled. Patient left via wheelchair with staff member assisting to car. No further issues at this time.

## 2025-06-28 NOTE — PROGRESS NOTES
End of Shift Note    Bedside shift change report given to FABIENNE Stuart (oncoming nurse) by Alban Hinojosa RN (offgoing nurse).  Report included the following information SBAR, Kardex, ED Summary, Procedure Summary, Intake/Output, MAR, Recent Results, Med Rec Status, Cardiac Rhythm sinus rhythm, Alarm Parameters , and Quality Measures    Shift worked:  7a-7p     Shift summary and any significant changes:     Patient voiced complaint of pain. PRN medication given with relief.CHG bath completed. Tolerated diet. Patient remained stable at the time of shift change.      Concerns for physician to address:       Zone phone for oncoming shift:          Activity:  Level of Assistance: Minimal assist, patient does 75% or more  Number times ambulated in hallways past shift:   Number of times OOB to chair past shift:     Cardiac:   Cardiac Monitoring:       Cardiac Rhythm: Sinus rhythm    Access:  Current line(s):     Genitourinary:        Respiratory:   O2 Device: None (Room air)  Chronic home O2 use?:   Incentive spirometer at bedside:     GI:  Last BM (including prior to admit): 06/23/25  Current diet:  ADULT DIET; Regular; 4 carb choices (60 gm/meal); Low Fat/Low Chol/High Fiber/ARMIDA  Passing flatus:     Pain Management:   Patient states pain is manageable on current regimen:     Skin:  James Scale Score: 20  Interventions: Wound Offloading (Prevention Methods): Repositioning, Pillows    Patient Safety:  Fall Risk: Nursing Judgement-Fall Risk High(Add Comments): No  Fall Risk Interventions  Nursing Judgement-Fall Risk High(Add Comments): No  Toilet Every 2 Hours-In Advance of Need: Yes  Hourly Visual Checks: In bed, Awake  Fall Visual Posted: Socks  Room Door Open: Deferred to promote rest  Alarm On: Bed  Patient Moved Closer to Nursing Station: No    Active Consults:   IP CONSULT TO CARDIOLOGY    Length of Stay:  Expected LOS: 6  Actual LOS: 5    Alban Hinojosa RN

## 2025-06-29 LAB
BACTERIA SPEC CULT: NORMAL
BACTERIA SPEC CULT: NORMAL
SERVICE CMNT-IMP: NORMAL
SERVICE CMNT-IMP: NORMAL

## 2025-07-16 ENCOUNTER — TRANSCRIBE ORDERS (OUTPATIENT)
Facility: HOSPITAL | Age: 65
End: 2025-07-16

## 2025-07-16 DIAGNOSIS — J18.9 PNEUMONIA DUE TO INFECTIOUS ORGANISM, UNSPECIFIED LATERALITY, UNSPECIFIED PART OF LUNG: Primary | ICD-10-CM
